# Patient Record
Sex: FEMALE | Race: WHITE | Employment: OTHER | URBAN - METROPOLITAN AREA
[De-identification: names, ages, dates, MRNs, and addresses within clinical notes are randomized per-mention and may not be internally consistent; named-entity substitution may affect disease eponyms.]

---

## 2017-08-04 ENCOUNTER — CLINICAL SUPPORT (OUTPATIENT)
Dept: INTERNAL MEDICINE CLINIC | Age: 82
End: 2017-08-04

## 2017-08-04 DIAGNOSIS — Z11.1 ENCOUNTER FOR PPD TEST: Primary | ICD-10-CM

## 2017-08-04 LAB
MM INDURATION POC: 0 MM (ref 0–5)
PPD POC: NORMAL NEGATIVE

## 2017-08-04 NOTE — PROGRESS NOTES
PPD Placement note  Garett Mcfarland, 80 y.o. female is here today for placement of PPD test  Reason for PPD test: placement  Pt taken PPD test before: no  Verified in allergy area and with patient that they are not allergic to the products PPD is made of (Phenol or Tween). Yes  Is patient taking any oral or IV steroid medication now or have they taken it in the last month? no  Has the patient ever received the BCG vaccine?: no  Has the patient been in recent contact with anyone known or suspected of having active TB disease?: no    Patient's Country of origin?: [place]  O: Alert and oriented in NAD. P:  PPD placed on 8/4/2017. Patient advised to return for reading within 48-72 hours.

## 2017-08-07 ENCOUNTER — OFFICE VISIT (OUTPATIENT)
Dept: INTERNAL MEDICINE CLINIC | Age: 82
End: 2017-08-07

## 2017-08-07 VITALS
TEMPERATURE: 95.6 F | HEART RATE: 68 BPM | BODY MASS INDEX: 18.75 KG/M2 | WEIGHT: 93 LBS | RESPIRATION RATE: 18 BRPM | OXYGEN SATURATION: 99 % | SYSTOLIC BLOOD PRESSURE: 126 MMHG | HEIGHT: 59 IN | DIASTOLIC BLOOD PRESSURE: 55 MMHG

## 2017-08-07 DIAGNOSIS — H35.30 MACULAR DEGENERATION: ICD-10-CM

## 2017-08-07 DIAGNOSIS — I10 ESSENTIAL HYPERTENSION: ICD-10-CM

## 2017-08-07 DIAGNOSIS — F03.90 DEMENTIA WITHOUT BEHAVIORAL DISTURBANCE, UNSPECIFIED DEMENTIA TYPE: Primary | ICD-10-CM

## 2017-08-07 DIAGNOSIS — I25.10 CORONARY ARTERY DISEASE INVOLVING NATIVE HEART WITHOUT ANGINA PECTORIS, UNSPECIFIED VESSEL OR LESION TYPE: ICD-10-CM

## 2017-08-07 DIAGNOSIS — L98.9 SKIN LESION: ICD-10-CM

## 2017-08-07 DIAGNOSIS — I35.0 AORTIC VALVE STENOSIS, UNSPECIFIED ETIOLOGY: ICD-10-CM

## 2017-08-07 RX ORDER — METOPROLOL SUCCINATE 50 MG/1
25 TABLET, EXTENDED RELEASE ORAL DAILY
COMMUNITY
End: 2019-01-01

## 2017-08-07 RX ORDER — MULTIVITAMIN WITH IRON
1 TABLET ORAL DAILY
COMMUNITY
End: 2017-11-07

## 2017-08-07 RX ORDER — CARBOXYMETHYLCELLULOSE SODIUM 10 MG/ML
GEL OPHTHALMIC
COMMUNITY
End: 2018-01-01 | Stop reason: SDUPTHER

## 2017-08-07 RX ORDER — MELATONIN
1 DAILY
COMMUNITY

## 2017-08-07 RX ORDER — NITROGLYCERIN 0.4 MG/1
TABLET SUBLINGUAL
COMMUNITY

## 2017-08-07 RX ORDER — RAMIPRIL 5 MG/1
CAPSULE ORAL DAILY
COMMUNITY
End: 2019-01-01

## 2017-08-07 RX ORDER — DIPHENHYDRAMINE HCL 25 MG
1 TABLET,DISINTEGRATING ORAL DAILY
COMMUNITY

## 2017-08-07 RX ORDER — ASPIRIN 81 MG/1
1 TABLET ORAL DAILY
COMMUNITY
End: 2019-01-01

## 2017-08-07 RX ORDER — AMOXICILLIN 500 MG
CAPSULE ORAL
COMMUNITY
End: 2017-11-07

## 2017-08-07 RX ORDER — TRAVOPROST OPHTHALMIC SOLUTION 0.04 MG/ML
1 SOLUTION OPHTHALMIC EVERY EVENING
COMMUNITY

## 2017-08-07 NOTE — MR AVS SNAPSHOT
Visit Information Date & Time Provider Department Dept. Phone Encounter #  
 8/7/2017 12:45 PM Jimi Hamlin [phone] Follow-up Instructions Return in about 3 months (around 11/7/2017), or if symptoms worsen or fail to improve. Upcoming Health Maintenance Date Due DTaP/Tdap/Td series (1 - Tdap) 1/10/1949 ZOSTER VACCINE AGE 60> 11/10/1987 GLAUCOMA SCREENING Q2Y 1/10/1993 OSTEOPOROSIS SCREENING (DEXA) 1/10/1993 Pneumococcal 65+ Low/Medium Risk (1 of 2 - PCV13) 1/10/1993 MEDICARE YEARLY EXAM 1/10/1993 INFLUENZA AGE 9 TO ADULT 8/1/2017 Allergies as of 8/7/2017  Review Complete On: 8/7/2017 By: Jimi Hamlin MD  
 No Known Allergies Current Immunizations  Never Reviewed Name Date  
 TB Skin Test (PPD) Intradermal 8/4/2017 Not reviewed this visit You Were Diagnosed With   
  
 Codes Comments Dementia without behavioral disturbance, unspecified dementia type    -  Primary ICD-10-CM: F03.90 ICD-9-CM: 294.20 Essential hypertension     ICD-10-CM: I10 
ICD-9-CM: 401.9 Coronary artery disease involving native heart without angina pectoris, unspecified vessel or lesion type     ICD-10-CM: I25.10 ICD-9-CM: 414.01 Aortic valve stenosis, unspecified etiology     ICD-10-CM: I35.0 ICD-9-CM: 424.1 Macular degeneration     ICD-10-CM: H35.30 ICD-9-CM: 362.50 Vitals BP Pulse Temp Resp Height(growth percentile) Weight(growth percentile) 126/55 68 95.6 °F (35.3 °C) (Oral) 18 4' 10.5\" (1.486 m) 93 lb (42.2 kg) SpO2 BMI OB Status Smoking Status 99% 19.11 kg/m2 Postmenopausal Former Smoker BMI and BSA Data Body Mass Index Body Surface Area  
 19.11 kg/m 2 1.32 m 2 Your Updated Medication List  
  
   
This list is accurate as of: 8/7/17  2:00 PM.  Always use your most recent med list.  
  
  
  
  
 aspirin delayed-release 81 mg tablet Take  by mouth daily. calcium carbonate-vitamin D3 600 mg(1,500mg) -800 unit Tab Take  by mouth. DAILY MULTI-VITAMINS/IRON tablet Generic drug:  multivitamin with iron Take 1 Tab by mouth daily. FISH -1,000 mg Cap Generic drug:  fish oil-omega-3 fatty acids Take  by mouth.  
  
 metoprolol succinate 50 mg XL tablet Commonly known as:  TOPROL-XL Take  by mouth daily. NITROSTAT 0.4 mg SL tablet Generic drug:  nitroglycerin  
by SubLINGual route every five (5) minutes as needed for Chest Pain. ramipril 5 mg capsule Commonly known as:  ALTACE Take  by mouth daily. REFRESH LIQUIGEL 1 % Dlgl Generic drug:  carboxymethylcellulose sodium Apply  to eye. travoprost 0.004 % ophthalmic solution Commonly known as:  TRAVATAN Z Administer 1 Drop to both eyes every evening. VITAMIN D3 1,000 unit tablet Generic drug:  cholecalciferol Take  by mouth daily. Follow-up Instructions Return in about 3 months (around 11/7/2017), or if symptoms worsen or fail to improve. Patient Instructions Helping A Person With Dementia: Care Instructions Your Care Instructions Dementia is a loss of mental skills that affects daily life. It is different from mild memory loss that occurs with aging. Dementia can cause problems with memory, thinking clearly, and planning. It is different for everyone. But it usually gets worse slowly. Some people who have dementia can function well for a long time. But at some point it may become hard for the person to care for himself or herself. It can be upsetting to learn that a loved one has this condition. You may be afraid and worried about what will happen. You may wonder how you will care for the person. There is no cure for dementia. But medicine may be able to slow memory loss and improve thinking for a while. Other medicines may help with sleep, depression, and behavior changes. Dementia is different for everyone. In some cases, people can function well for a long time. You can help your loved one by making his or her home life easier and safer. You also need to take care of yourself. Caregiving can be stressful. But support is available to help you and give you a break when you need it. The Alzheimer's Association offers good information and support. If you are caring for someone with dementia, you can help make life safer and more comfortable. You can also help your loved one make decisions about future care. You may also want to bring up legal and financial issues. These are hard but important conversations to have. Follow-up care is a key part of your loved one's treatment and safety. Be sure to make and go to all appointments, and call your doctor if your loved one is having problems. It's also a good idea to know your loved one's test results and keep a list of the medicines he or she takes. How can you care for your loved one at home? Taking care of the person · If the person takes medicine for dementia, help him or her take it exactly as prescribed. Call the doctor if you notice any problems with the medicine. · Make a list of the person's medicines. Review it with all of his or her doctors. · Help the person eat a balanced diet. Serve plenty of whole grains, fruits, and vegetables every day. If the person is not hungry at mealtimes, give snacks at midmorning and in the afternoon. Offer drinks such as Boost, Ensure, or Sustacal if the person is losing weight. · Encourage exercise. Walking and other activities may slow the decline of mental ability. Help the person stay active mentally with reading, crossword puzzles, or other hobbies. · Take steps to help if the person is sundowning. This is the restless behavior and trouble with sleeping that may occur in late afternoon and at night. Try not to let the person nap during the day.  Offer a glass of warm milk or caffeine-free tea before bedtime. · Develop a routine. Your loved one will feel less frustrated or confused with a clear, simple plan of what to do every day. · Be patient. A task may take the person longer than it used to. · For as long as he or she is able, allow your loved one to make decisions about activities, food, clothing, and other choices. Let him or her be independent, even if tasks take more time or are not done perfectly. Tailor tasks to the person's abilities. For example, if cooking is no longer safe, ask for other help. Your loved one can help set the table, or make simple dishes such as a salad. When the person needs help, offer it gently. Staying safe · Make your home (or your loved one's home) safe. Tack down rugs, and put no-slip tape in the tub. Install handrails, and put safety switches on stoves and appliances. Keep rooms free of clutter. Make sure walkways around furniture are clear. Do not move furniture around, because the person may become confused. · Use locks on doors and cupboards. Lock up knives, scissors, medicines, cleaning supplies, and other dangerous things. · Do not let the person drive or cook if he or she can't do it safely. A person with dementia should not drive unless he or she is able to pass an on-road driving test. Your state 's license bureau can do a driving test if there is any question. · Get medical alert [facility] for the person so that you can be contacted if he or she wanders away. If possible, provide a safe place for wandering, such as an enclosed yard or garden. Taking care of yourself · Ask your doctor about support groups and other resources in your area. · Take care of your health. Be sure to eat healthy foods and get enough rest and exercise. · Take time for yourself. Respite services provide someone to stay with the person for a short time while you get out of the house for a few hours. · Make time for an activity that you enjoy. Read, listen to music, paint, do crafts, or play an instrument, even if it's only for a few minutes a day. · Spend time with family, friends, and others in your support system. When should you call for help? Call 911 anytime you think the person may need emergency care. For example, call if: · The person who has dementia wanders away and you can't find him or her. · The person who has dementia is seriously injured. Call the doctor now or seek immediate medical care if: · The person suddenly sees things that are not there (hallucinates). · The person has a sudden change in his or her behavior. Watch closely for changes in the person's health, and be sure to contact the doctor if: · The person has symptoms that could cause injury. · The person has problems with his or her medicine. · You need more information to care for a person with dementia. · You need respite care so you can take a break. Where can you learn more? Go to http://anneliese-natalio.info/. Enter L974 in the search box to learn more about \"Helping A Person With Dementia: Care Instructions. \" Current as of: July 26, 2016 Content Version: 11.3 © 6432-6500 [employer]. Care instructions adapted under license by [employer] (which disclaims liability or warranty for this information). If you have questions about a medical condition or this instruction, always ask your healthcare professional. [place] [address] any warranty or liability for your use of this information. Introducing [facility]! [employer] introduces [employer] patient portal. Now you can access parts of your medical record, email your doctor's office, and request medication refills online. 1. In your internet browser, go to [email or web address] 2. Click on the First Time User? Click Here link in the Sign In box.  You will see the New Member Sign Up page. 3. Enter your MeeDoc Access Code exactly as it appears below. You will not need to use this code after youve completed the sign-up process. If you do not sign up before the expiration date, you must request a new code. · MeeDoc Access Code: [email or web address] Expires: 11/5/2017  2:00 PM 
 
4. Enter the last four digits of your Social Security Number (xxxx) and Date of Birth (mm/dd/yyyy) as indicated and click Submit. You will be taken to the next sign-up page. 5. Create a MeeDoc ID. This will be your MeeDoc login ID and cannot be changed, so think of one that is secure and easy to remember. 6. Create a MeeDoc password. You can change your password at any time. 7. Enter your Password Reset Question and Answer. This can be used at a later time if you forget your password. 8. Enter your e-mail address. You will receive e-mail notification when new information is available in [address]. 9. Click Sign Up. You can now view and download portions of your medical record. 10. Click the Download Summary menu link to download a portable copy of your medical information. If you have questions, please visit the Frequently Asked Questions section of the MeeDoc website. Remember, MeeDoc is NOT to be used for urgent needs. For medical emergencies, dial 911. Now available from your iPhone and Android! Please provide this summary of care documentation to your next provider. If you have any questions after today's visit, please call [phone].

## 2017-08-07 NOTE — LETTER
8/7/2017 1:51 PM 
 
Ms. Harjit Gaspar [address] [id number] To Whom It May Concern: The above patient was evaluated at [facility]. Due to her cognitive impairments, she is not capable of living independently at this time and assisted living and supervision is recommended. Please contact our office if further information is needed. Sincerely, Michael Armando MD

## 2017-08-07 NOTE — PATIENT INSTRUCTIONS
Helping A Person With Dementia: Care Instructions  Your Care Instructions  Dementia is a loss of mental skills that affects daily life. It is different from mild memory loss that occurs with aging. Dementia can cause problems with memory, thinking clearly, and planning. It is different for everyone. But it usually gets worse slowly. Some people who have dementia can function well for a long time. But at some point it may become hard for the person to care for himself or herself. It can be upsetting to learn that a loved one has this condition. You may be afraid and worried about what will happen. You may wonder how you will care for the person. There is no cure for dementia. But medicine may be able to slow memory loss and improve thinking for a while. Other medicines may help with sleep, depression, and behavior changes. Dementia is different for everyone. In some cases, people can function well for a long time. You can help your loved one by making his or her home life easier and safer. You also need to take care of yourself. Caregiving can be stressful. But support is available to help you and give you a break when you need it. The Alzheimer's Association offers good information and support. If you are caring for someone with dementia, you can help make life safer and more comfortable. You can also help your loved one make decisions about future care. You may also want to bring up legal and financial issues. These are hard but important conversations to have. Follow-up care is a key part of your loved one's treatment and safety. Be sure to make and go to all appointments, and call your doctor if your loved one is having problems. It's also a good idea to know your loved one's test results and keep a list of the medicines he or she takes. How can you care for your loved one at home? Taking care of the person  · If the person takes medicine for dementia, help him or her take it exactly as prescribed. Call the doctor if you notice any problems with the medicine. · Make a list of the person's medicines. Review it with all of his or her doctors. · Help the person eat a balanced diet. Serve plenty of whole grains, fruits, and vegetables every day. If the person is not hungry at mealtimes, give snacks at midmorning and in the afternoon. Offer drinks such as Boost, Ensure, or Sustacal if the person is losing weight. · Encourage exercise. Walking and other activities may slow the decline of mental ability. Help the person stay active mentally with reading, crossword puzzles, or other hobbies. · Take steps to help if the person is sundowning. This is the restless behavior and trouble with sleeping that may occur in late afternoon and at night. Try not to let the person nap during the day. Offer a glass of warm milk or caffeine-free tea before bedtime. · Develop a routine. Your loved one will feel less frustrated or confused with a clear, simple plan of what to do every day. · Be patient. A task may take the person longer than it used to. · For as long as he or she is able, allow your loved one to make decisions about activities, food, clothing, and other choices. Let him or her be independent, even if tasks take more time or are not done perfectly. Tailor tasks to the person's abilities. For example, if cooking is no longer safe, ask for other help. Your loved one can help set the table, or make simple dishes such as a salad. When the person needs help, offer it gently. Staying safe  · Make your home (or your loved one's home) safe. Tack down rugs, and put no-slip tape in the tub. Install handrails, and put safety switches on stoves and appliances. Keep rooms free of clutter. Make sure walkways around furniture are clear. Do not move furniture around, because the person may become confused. · Use locks on doors and cupboards.  Lock up knives, scissors, medicines, cleaning supplies, and other dangerous things. · Do not let the person drive or cook if he or she can't do it safely. A person with dementia should not drive unless he or she is able to pass an on-road driving test. Your state 's license bureau can do a driving test if there is any question. · Get medical alert jewelry for the person so that you can be contacted if he or she wanders away. If possible, provide a safe place for wandering, such as an enclosed yard or garden. Taking care of yourself  · Ask your doctor about support groups and other resources in your area. · Take care of your health. Be sure to eat healthy foods and get enough rest and exercise. · Take time for yourself. Respite services provide someone to stay with the person for a short time while you get out of the house for a few hours. · Make time for an activity that you enjoy. Read, listen to music, paint, do crafts, or play an instrument, even if it's only for a few minutes a day. · Spend time with family, friends, and others in your support system. When should you call for help? Call 911 anytime you think the person may need emergency care. For example, call if:  · The person who has dementia wanders away and you can't find him or her. · The person who has dementia is seriously injured. Call the doctor now or seek immediate medical care if:  · The person suddenly sees things that are not there (hallucinates). · The person has a sudden change in his or her behavior. Watch closely for changes in the person's health, and be sure to contact the doctor if:  · The person has symptoms that could cause injury. · The person has problems with his or her medicine. · You need more information to care for a person with dementia. · You need respite care so you can take a break. Where can you learn more? Go to http://anneliese-natalio.info/. Enter M131 in the search box to learn more about \"Helping A Person With Dementia: Care Instructions. \"  Current as of: July 26, 2016  Content Version: 11.3  © 8499-4301 [employer]. Care instructions adapted under license by [employer] (which disclaims liability or warranty for this information). If you have questions about a medical condition or this instruction, always ask your healthcare professional. [place] [address] any warranty or liability for your use of this information.

## 2017-08-07 NOTE — PROGRESS NOTES
HISTORY OF PRESENT ILLNESS  Candie Che is a 80 y.o. female. HPI Comments: 81 yo female here to establish care, completion of paperwork for ESA, f/u of HTN, CAD. She had been living in [place] for past 18 months, but recently moved back to [place]. Has been living with her son Bard Merino and daughter-in-law for the past two weeks with plans to move to [place]. Family notes gradual memory issues over the past few years. She is independent with ADLs with supervision with bathing. Family helps with IADLs, medication management. Old records limited at this time but being requested. She had been seen by Dr. Gus Garcia - cardiology when she lived in [place] in the past. H/o CAD s/p 2 vessel CABG. Has AS which is mod-severe, but not having sx. She has h/o hip fracture in the past. Ambulates without assistive device. Macular degeneration, glaucoma followed by ophthalmology. Review of Systems   Constitutional: Negative for chills, fever and weight loss. HENT: Negative for congestion. Eyes: Negative for blurred vision and pain. Respiratory: Negative for cough and shortness of breath. Cardiovascular: Negative for chest pain, palpitations and leg swelling. Gastrointestinal: Negative for heartburn, nausea and vomiting. Musculoskeletal: Negative for falls, joint pain and myalgias. Skin: Positive for rash. Negative for itching. Skin lesion nose, R cheek; h/o skin cancer   Neurological: Negative for dizziness, tingling and headaches. Psychiatric/Behavioral: Negative for depression. The patient is not nervous/anxious. Past Medical History:   Diagnosis Date    Femoral hernia     Hip fracture, left ([facility] [address] 2013    Hypertension      No current outpatient prescriptions on file prior to visit. No current facility-administered medications on file prior to visit.       Social History   Substance Use Topics    Smoking status: Former Smoker    Smokeless tobacco: Never Used      Comment: approx 30 yrs ago    Alcohol use No      Comment: occasionally   No Known Allergies  Physical Exam   Constitutional: She appears well-developed and well-nourished. No distress. /55  Pulse 68  Temp 95.6 °F (35.3 °C) (Oral)   Resp 18  Ht 4' 10.5\" (1.486 m)  Wt 93 lb (42.2 kg)  SpO2 99%  BMI 19.11 kg/m2     Eyes: EOM are normal. Right eye exhibits no discharge. Left eye exhibits no discharge. No scleral icterus. Neck: Neck supple. Cardiovascular: Normal rate, regular rhythm and normal heart sounds. Exam reveals no gallop and no friction rub. No murmur heard. Pulmonary/Chest: Effort normal and breath sounds normal. No respiratory distress. She has no wheezes. She has no rales. Musculoskeletal: She exhibits no edema or tenderness. Lymphadenopathy:     She has no cervical adenopathy. Neurological: She is alert. She exhibits normal muscle tone. Skin: Skin is warm and dry. Lesion (? AK nose, R cheek) noted. Psychiatric: She has a normal mood and affect. Cognition and memory are impaired. MMSE 16/30       ASSESSMENT and PLAN    ICD-10-CM ICD-9-CM    1. Dementia without behavioral disturbance, unspecified dementia type F03.90 294.20    2. Essential hypertension I10 401.9    3. Coronary artery disease involving native heart without angina pectoris, unspecified vessel or lesion type I25.10 414.01    4. Aortic valve stenosis, unspecified etiology I35.0 424.1    5. Macular degeneration H35.30 362.50    Paperwork completed for ESA. Will continue current medication for now. Can consider adding medication such as donepezil or Namenda for dementia. Will obtain results of prior testing. CAD and AS are asymptomatic at this time. WIll monitor.    Derm referral for skin lesions given CA hx.

## 2017-08-07 NOTE — PROGRESS NOTES
Chief Complaint   Patient presents with    New Patient     Here for Facility Entrance papterwork to [address]       Pt preferred language for health care discussion is English. Is someone accompanying this pt? daughter    Is the patient using any DME equipment during OV? no    Depression Screening:  PHQ over the last two weeks 8/7/2017   Little interest or pleasure in doing things Not at all   Feeling down, depressed or hopeless Not at all   Total Score PHQ 2 0       Learning Assessment:  Learning Assessment 8/7/2017   PRIMARY LEARNER Patient   HIGHEST LEVEL OF EDUCATION - PRIMARY LEARNER  SOME COLLEGE   PRIMARY LANGUAGE ENGLISH   LEARNER PREFERENCE PRIMARY READING   ANSWERED BY Luis Alfredo Macias   RELATIONSHIP SELF       Abuse Screening:  Abuse Screening Questionnaire 8/7/2017   Do you ever feel afraid of your partner? N   Are you in a relationship with someone who physically or mentally threatens you? N   Is it safe for you to go home? Y       Fall Risk  Fall Risk Assessment, last 12 mths 8/7/2017   Able to walk? Yes   Fall in past 12 months? No         Health Maintenance reviewed and discussed per provider. Yes, first visit, all HM discussed and ordered by the Provider      Advance Directive:  1. Do you have an advance directive in place? Patient Reply:Unsure    2. If not, would you like material regarding how to put one in place? Patient Reply: Yes given    Coordination of Care:  1. Have you been to the ER, urgent care clinic since your last visit? Hospitalized since your last visit? no    2. Have you seen or consulted any other health care providers outside of the [address] [place] [address] since your last visit? Include any pap smears or colon screening.  no

## 2017-10-13 RX ORDER — PHENOL/SODIUM PHENOLATE
20 AEROSOL, SPRAY (ML) MUCOUS MEMBRANE DAILY
Qty: 90 TAB | Refills: 3 | Status: SHIPPED | OUTPATIENT
Start: 2017-10-13 | End: 2017-11-07 | Stop reason: SDUPTHER

## 2017-11-07 ENCOUNTER — OFFICE VISIT (OUTPATIENT)
Dept: INTERNAL MEDICINE CLINIC | Age: 82
End: 2017-11-07

## 2017-11-07 VITALS
RESPIRATION RATE: 14 BRPM | TEMPERATURE: 95.7 F | SYSTOLIC BLOOD PRESSURE: 146 MMHG | BODY MASS INDEX: 18.99 KG/M2 | OXYGEN SATURATION: 99 % | HEART RATE: 67 BPM | WEIGHT: 94.2 LBS | DIASTOLIC BLOOD PRESSURE: 49 MMHG | HEIGHT: 59 IN

## 2017-11-07 DIAGNOSIS — F02.80 LATE ONSET ALZHEIMER'S DISEASE WITHOUT BEHAVIORAL DISTURBANCE (HCC): ICD-10-CM

## 2017-11-07 DIAGNOSIS — K21.9 GASTROESOPHAGEAL REFLUX DISEASE WITHOUT ESOPHAGITIS: ICD-10-CM

## 2017-11-07 DIAGNOSIS — I35.0 AORTIC STENOSIS, SEVERE: ICD-10-CM

## 2017-11-07 DIAGNOSIS — G30.1 LATE ONSET ALZHEIMER'S DISEASE WITHOUT BEHAVIORAL DISTURBANCE (HCC): ICD-10-CM

## 2017-11-07 DIAGNOSIS — Z00.00 MEDICARE ANNUAL WELLNESS VISIT, SUBSEQUENT: Primary | ICD-10-CM

## 2017-11-07 DIAGNOSIS — I10 ESSENTIAL HYPERTENSION: ICD-10-CM

## 2017-11-07 DIAGNOSIS — H61.23 CERUMEN DEBRIS ON TYMPANIC MEMBRANE OF BOTH EARS: ICD-10-CM

## 2017-11-07 RX ORDER — DONEPEZIL HYDROCHLORIDE 5 MG/1
5 TABLET, FILM COATED ORAL
Qty: 30 TAB | Refills: 2 | Status: SHIPPED | OUTPATIENT
Start: 2017-11-07

## 2017-11-07 RX ORDER — PHENOL/SODIUM PHENOLATE
20 AEROSOL, SPRAY (ML) MUCOUS MEMBRANE DAILY
Qty: 90 TAB | Refills: 3 | Status: SHIPPED | OUTPATIENT
Start: 2017-11-07 | End: 2017-12-08 | Stop reason: SDUPTHER

## 2017-11-07 NOTE — MR AVS SNAPSHOT
Visit Information Date & Time Provider Department Dept. Phone Encounter #  
 11/7/2017 10:30 AM [facility] [phone] [id number] Follow-up Instructions Return in about 4 months (around 3/7/2018), or if symptoms worsen or fail to improve. Upcoming Health Maintenance Date Due DTaP/Tdap/Td series (1 - Tdap) 1[birth year]10/1949 ZOSTER VACCINE AGE 60> 11/10/1987 GLAUCOMA SCREENING Q2Y 1/10/1993 OSTEOPOROSIS SCREENING (DEXA) 1/10/1993 MEDICARE YEARLY EXAM 1/10/1993 Allergies as of 11/7/2017  Review Complete On: 11/7/2017 By: Bernarda Crowe MD  
 No Known Allergies Current Immunizations  Reviewed on 11/1/2017 Name Date Influenza High Dose Vaccine PF 11/1/2017 Influenza Vaccine 10/18/2012 12:00 AM  
 Pneumococcal Conjugate (PCV-13) 5/28/2015 Pneumococcal Polysaccharide (PPSV-23) 10/18/2012 12:00 AM, 10/29/2007 TB Skin Test (PPD) Intradermal 8/4/2017 Not reviewed this visit You Were Diagnosed With   
  
 Codes Comments Medicare annual wellness visit, subsequent    -  Primary ICD-10-CM: Z00.00 ICD-9-CM: V70.0 Late onset Alzheimer's disease without behavioral disturbance     ICD-10-CM: G30.1, F02.80 ICD-9-CM: 331.0, 294.10 Essential hypertension     ICD-10-CM: I10 
ICD-9-CM: 401.9 Aortic stenosis, severe     ICD-10-CM: I35.0 ICD-9-CM: 424.1 Gastroesophageal reflux disease without esophagitis     ICD-10-CM: K21.9 ICD-9-CM: 530.81 Cerumen debris on tympanic membrane of both ears     ICD-10-CM: H61.23 
ICD-9-CM: 380.4 Vitals BP Pulse Temp Resp Height(growth percentile) Weight(growth percentile) 146/49 (BP 1 Location: Left arm, BP Patient Position: Sitting) 67 95.7 °F (35.4 °C) (Oral) 14 4' 10.5\" (1.486 m) 94 lb 3.2 oz (42.7 kg) SpO2 BMI OB Status Smoking Status 99% 19.35 kg/m2 Postmenopausal Former Smoker Vitals History BMI and BSA Data Body Mass Index Body Surface Area  
 19.35 kg/m 2 1.33 m 2 Your Updated Medication List  
  
   
This list is accurate as of: 11/7/17 11:18 AM.  Always use your most recent med list.  
  
  
  
  
 aspirin delayed-release 81 mg tablet Take  by mouth daily. calcium carbonate-vitamin D3 600 mg(1,500mg) -800 unit Tab Take  by mouth. donepezil 5 mg tablet Commonly known as:  ARICEPT Take 1 Tab by mouth nightly. For one month. If tolerated, increase to 10 mg daily after one month  
  
 metoprolol succinate 50 mg XL tablet Commonly known as:  TOPROL-XL Take  by mouth daily. NITROSTAT 0.4 mg SL tablet Generic drug:  nitroglycerin  
by SubLINGual route every five (5) minutes as needed for Chest Pain. Omeprazole delayed release 20 mg tablet Commonly known as:  PRILOSEC D/R Take 1 Tab by mouth daily. May take for 14 days then discontinue. May repeat for symptom recurrence  Indications: Heartburn  
  
 ramipril 5 mg capsule Commonly known as:  ALTACE Take  by mouth daily. REFRESH LIQUIGEL 1 % Dlgl Generic drug:  carboxymethylcellulose sodium Apply  to eye. travoprost 0.004 % ophthalmic solution Commonly known as:  TRAVATAN Z Administer 1 Drop to both eyes every evening. VITAMIN D3 1,000 unit tablet Generic drug:  cholecalciferol Take  by mouth daily. Prescriptions Printed Refills Omeprazole delayed release (PRILOSEC D/R) 20 mg tablet 3 Sig: Take 1 Tab by mouth daily. May take for 14 days then discontinue. May repeat for symptom recurrence  Indications: Heartburn Class: Print Route: Oral  
 donepezil (ARICEPT) 5 mg tablet 2 Sig: Take 1 Tab by mouth nightly. For one month. If tolerated, increase to 10 mg daily after one month Class: Print Route: Oral  
  
We Performed the Following REFERRAL TO AUDIOLOGY [REF7 Custom] Comments:  
 Please evaluate patient for hearing loss. Follow-up Instructions Return in about 4 months (around 3/7/2018), or if symptoms worsen or fail to improve. Referral Information Referral ID Referred By Referred To  
  
 [id number] Randi WILEY Not Available Visits Status Start Date End Date 1 New Request 11/7/17 11/7/18 If your referral has a status of pending review or denied, additional information will be sent to support the outcome of this decision. Patient Instructions Donepezil (Aricept) can be increased to 10 mg daily in one month if tolerated. Medicare Wellness Visit, Female The best way to live healthy is to have a healthy lifestyle by eating a well-balanced diet, exercising regularly, limiting alcohol and stopping smoking. Regular physical exams and screening tests are another way to keep healthy. Preventive exams provided by your health care provider can find health problems before they become diseases or illnesses. Preventive services including immunizations, screening tests, monitoring and exams can help you take care of your own health. All people over age 72 should have a pneumovax  and and a prevnar shot to prevent pneumonia. These are once in a lifetime unless you and your provider decide differently. All people over 65 should have a yearly flu shot and a tetanus vaccine every 10 years. A bone mass density to screen for osteoporosis or thinning of the bones should be done every 2 years after 65. Screening for diabetes mellitus with a blood sugar test should be done every year. Glaucoma is a disease of the eye due to increased ocular pressure that can lead to blindness and it should be done every year by an eye professional. 
 
Cardiovascular screening tests that check for elevated lipids (fatty part of blood) which can lead to heart disease and strokes should be done every 5 years.  
 
Colorectal screening that evaluates for blood or polyps in your colon should be done yearly as a stool test or every five years as a flexible sigmoidoscope or every 10 years as a colonoscopy up to age 76. Breast cancer screening with a mammogram is recommended biennially  for women age 54-69. Screening for cervical cancer with a pap smear and pelvic exam is recommended for women after age 72 years every 2 years up to age 79 or when the provider and patient decide to stop. If there is a history of cervical abnormalities or other increased risk for cancer then the test is recommended yearly. Hepatitis C screening is also recommended for anyone born between 80 through [address]. A shingles vaccine is also recommended once in a lifetime after age 61. Your Medicare Wellness Exam is recommended annually. Here is a list of your current Health Maintenance items with a due date: 
Health Maintenance Due Topic Date Due  
 DTaP/Tdap/Td  (1 - Tdap) 01/10/1949  Shingles Vaccine  11/10/1987  Glaucoma Screening   01/10/1993  Bone Density Screening  01/10/1993 Ta Ruelas Annual Well Visit  01/10/1993 Donepezil (By mouth) Donepezil ([id number]) Treats Alzheimer disease. Brand Name(s): Aricept There may be other brand names for this medicine. When This Medicine Should Not Be Used: This medicine is not right for everyone. Do not use it if you had an allergic reaction to donepezil or to medicines that contain piperidine. How to Use This Medicine:  
Tablet, Dissolving Tablet · Your doctor will tell you how much medicine to use. Do not use more than directed. · Read and follow the patient instructions that come with this medicine. Talk to your doctor or pharmacist if you have any questions. · Tablet: Swallow the 23-mg tablet whole. Do not crush, break, or chew it. · Disintegrating tablet:Make sure your hands are dry before you handle the disintegrating tablet.  Peel back the foil from the blister pack, then remove the tablet. Do not push the tablet through the foil. Place the tablet in your mouth. After it has melted, swallow or take a drink of water. · Missed dose: Skip the missed dose and take your next dose at the regular time. Do not take extra medicine to make up for a missed dose. · Store the medicine in a closed container at room temperature, away from heat, moisture, and direct light. Drugs and Foods to Avoid: Ask your doctor or pharmacist before using any other medicine, including over-the-counter medicines, vitamins, and herbal products. · Some medicines can affect how donepezil works. Tell your doctor if you are using any of the following: ¨ Bethanechol, carbamazepine, dexamethasone, ketoconazole, phenobarbital, phenytoin, quinidine, or rifampin ¨ NSAID pain or arthritis medicine (such as aspirin, diclofenac, ibuprofen, naproxen) Warnings While Using This Medicine: · Tell your doctor if you are pregnant or breastfeeding, or if you have heart disease, lung disease, asthma or other breathing problems, stomach ulcers or bleeding, or trouble urinating. · This medicine may cause the following problems: ¨ Slow heartbeat ¨ Stomach or bowel bleeding ¨ Seizures · Tell any doctor or dentist who treats you that you are using this medicine. You may need to stop using this medicine several days before you have surgery or medical tests. · Your doctor will check your progress and the effects of this medicine at regular visits. Keep all appointments. · Keep all medicine out of the reach of children. Never share your medicine with anyone. Possible Side Effects While Using This Medicine:  
Call your doctor right away if you notice any of these side effects: · Allergic reaction: Itching or hives, swelling in your face or hands, swelling or tingling in your mouth or throat, chest tightness, trouble breathing · Bloody or black, tarry stools · Change in how much or how often you urinate · Chest pain, slow or uneven heartbeat, trouble breathing · Lightheadedness, dizziness, fainting · Seizures · Severe stomach pain · Unusual bleeding, bruising, or weakness · Vomiting of blood or material that looks like coffee grounds If you notice these less serious side effects, talk with your doctor: · Mild nausea, vomiting, or diarrhea · Weight loss If you notice other side effects that you think are caused by this medicine, tell your doctor. Call your doctor for medical advice about side effects. You may report side effects to FDA at 9-056-IQU-0432 © 2017 [address] Information is for End User's use only and may not be sold, redistributed or otherwise used for commercial purposes. The above information is an  only. It is not intended as medical advice for individual conditions or treatments. Talk to your doctor, nurse or pharmacist before following any medical regimen to see if it is safe and effective for you. Introducing hospitals & HEALTH SERVICES! Nerissa Luna introduces [employer] patient portal. Now you can access parts of your medical record, email your doctor's office, and request medication refills online. 1. In your internet browser, go to [email or web address] 2. Click on the First Time User? Click Here link in the Sign In box. You will see the New Member Sign Up page. 3. Enter your [employer] Access Code exactly as it appears below. You will not need to use this code after youve completed the sign-up process. If you do not sign up before the expiration date, you must request a new code. · [employer] Access Code: [email or web address] Expires: 2/5/2018 11:14 AM 
 
4. Enter the last four digits of your Social Security Number (xxxx) and Date of Birth (mm/dd/yyyy) as indicated and click Submit. You will be taken to the next sign-up page. 5. Create a [employer] ID.  This will be your [employer] login ID and cannot be changed, so think of one that is secure and easy to remember. 6. Create a [employer] password. You can change your password at any time. 7. Enter your Password Reset Question and Answer. This can be used at a later time if you forget your password. 8. Enter your e-mail address. You will receive e-mail notification when new information is available in [address] 9. Click Sign Up. You can now view and download portions of your medical record. 10. Click the Download Summary menu link to download a portable copy of your medical information. If you have questions, please visit the Frequently Asked Questions section of the [employer] website. Remember, [employer] is NOT to be used for urgent needs. For medical emergencies, dial 911. Now available from your iPhone and Android! Please provide this summary of care documentation to your next provider. Your primary care clinician is listed as Junaid Chambers. If you have any questions after today's visit, please call [phone].

## 2017-11-07 NOTE — PROGRESS NOTES
ROOM # [id number]    Magaly Skelton presents today for   Chief Complaint   Patient presents with   [facility] Annual Wellness Visit         Dementia    Hypertension       Magaly Skelton preferred language for health care discussion is english/other. Is someone accompanying this pt? Yes, daughter in law    Is the patient using any DME equipment during [address]? no    Depression Screening:  PHQ over the last two weeks 8/7/2017   Little interest or pleasure in doing things Not at all   Feeling down, depressed or hopeless Not at all   Total Score PHQ 2 0       Learning Assessment:  Learning Assessment 8/7/2017   PRIMARY LEARNER Patient   HIGHEST LEVEL OF EDUCATION - PRIMARY LEARNER  SOME COLLEGE   PRIMARY LANGUAGE ENGLISH   LEARNER PREFERENCE PRIMARY READING   ANSWERED BY Nanda Natarajan   RELATIONSHIP SELF       Abuse Screening:  Abuse Screening Questionnaire 8/7/2017   Do you ever feel afraid of your partner? N   Are you in a relationship with someone who physically or mentally threatens you? N   Is it safe for you to go home? Y       Fall Risk  Fall Risk Assessment, last 12 mths 8/7/2017   Able to walk? Yes   Fall in past 12 months? No       Health Maintenance reviewed and discussed per provider. Yes    Magaly Skelton is due for tdap, zoster, glaucoma, dexa scan MWV (getting today). Please order/place referral if appropriate. Advance Directive:  1. Do you have an advance directive in place? Patient Reply: yes, - no changes to     Coordination of Care:  1. Have you been to the ER, urgent care clinic since your last visit? Hospitalized since your last visit? no    2. Have you seen or consulted any other health care providers outside of the [address] [place] [address] since your last visit? Include any pap smears or colon screening. Yes Derm, Cardiology    Please see Red banners under Allergies, Med rec, Immunizations to remove outside inquires. All correct information has been verified with patient and added to chart.

## 2017-11-07 NOTE — ACP (ADVANCE CARE PLANNING)
Advance Directive:  1. Do you have an advance directive in place?  Patient Reply: yes, - no changes to

## 2017-11-07 NOTE — PROGRESS NOTES
HISTORY OF PRESENT ILLNESS  Vaishali Case is a 80 y.o. female. HPI Comments: 81 yo female here for [address], f/u of dementia, HTN. She is accompanied by her daughter-in-law. Lives in [place] which she likes. Does get anxious/frustrated at times with her memory. Has not been on medication for this in the past. MMSE 16/30 last visit. BP is a little increased from last visit. Has seen cardiology for her AS. Fish oil stopped. May consider decreasing metoprolol in the future. No CP at this time. She occasionally will feel weak/dizzy at the end of [place] due to standing/kneeling. H/o GERD. Had not been taking PPI but notes occasionally with GI sx. Review of Systems   Constitutional: Negative for chills, fever and weight loss. HENT: Positive for hearing loss. Negative for congestion and ear pain. Eyes: Positive for blurred vision (macular degeneration). Negative for pain. Respiratory: Negative for cough and shortness of breath. Cardiovascular: Negative for chest pain, palpitations and leg swelling. Gastrointestinal: Negative for nausea and vomiting. Neurological: Negative for tingling and headaches. Psychiatric/Behavioral: Negative for depression. The patient is not nervous/anxious. Past Medical History:   Diagnosis Date    Aortic heart valve narrowing     Chronic kidney disease     Femoral hernia     GERD (gastroesophageal reflux disease)     Glaucoma     Hip fracture, left ([address] 2013    Hypercholesterolemia     Hypertension     Lump in the abdomen     Macular degeneration     Myocardial infarct, old     Pulmonary HTN     Unstable chest pain due to insufficient blood supply to heart [facility])      Current Outpatient Prescriptions on File Prior to Visit   Medication Sig Dispense Refill    Omeprazole delayed release (PRILOSEC D/R) 20 mg tablet Take 1 Tab by mouth daily. Indications: Heartburn 90 Tab 3    cholecalciferol (VITAMIN D3) 1,000 unit tablet Take  by mouth daily.       metoprolol succinate (TOPROL-XL) 50 mg XL tablet Take  by mouth daily.  aspirin delayed-release 81 mg tablet Take  by mouth daily.  calcium carbonate-vitamin D3 600 mg(1,500mg) -800 unit tab Take  by mouth.  fish oil-omega-3 fatty acids (FISH OIL) 300-1,000 mg cap Take  by mouth.  travoprost (TRAVATAN Z) 0.004 % ophthalmic solution Administer 1 Drop to both eyes every evening.  ramipril (ALTACE) 5 mg capsule Take  by mouth daily.  carboxymethylcellulose sodium (REFRESH LIQUIGEL) 1 % dlgl Apply  to eye.  nitroglycerin (NITROSTAT) 0.4 mg SL tablet by SubLINGual route every five (5) minutes as needed for Chest Pain.  multivitamin with iron (DAILY MULTI-VITAMINS/IRON) tablet Take 1 Tab by mouth daily. No current facility-administered medications on file prior to visit. Social History   Substance Use Topics    Smoking status: Former Smoker    Smokeless tobacco: Never Used      Comment: approx 30 yrs ago    Alcohol use No      Comment: occasionally     Physical Exam   Constitutional: She appears well-developed and well-nourished. No distress. /49 (BP 1 Location: Left arm, BP Patient Position: Sitting)  Pulse 67  Temp 95.7 °F (35.4 °C) (Oral)   Resp 14  Ht 4' 10.5\" (1.486 m)  Wt 94 lb 3.2 oz (42.7 kg)  SpO2 99%  BMI 19.35 kg/m2     HENT:   B cerumen debris   Eyes: EOM are normal. Right eye exhibits no discharge. Left eye exhibits no discharge. No scleral icterus. Neck: Neck supple. Cardiovascular: Normal rate and regular rhythm. Exam reveals no gallop and no friction rub. Murmur heard. Pulmonary/Chest: Effort normal and breath sounds normal. No respiratory distress. She has no wheezes. She has no rales. Musculoskeletal: She exhibits no edema or tenderness. Lymphadenopathy:     She has no cervical adenopathy. Neurological: She is alert. She exhibits normal muscle tone. Skin: Skin is warm and dry.    Psychiatric: She has a normal mood and affect. No results found for: NA, K, CL, CO2, AGAP, GLU, BUN, CREA, BUCR, GFRAA, GFRNA, CA, TBIL, TBILI, GPT, SGOT, AP, TP, ALB, GLOB, AGRAT, ALT    ASSESSMENT and PLAN    ICD-10-CM ICD-9-CM    1. Medicare annual wellness visit, subsequent Z00.00 V70.0 REFERRAL TO AUDIOLOGY   2. Late onset Alzheimer's disease without behavioral disturbance G30.1 331.0     F02.80 294.10    3. Essential hypertension I10 401.9    4. Aortic stenosis, severe I35.0 424.1    5. Gastroesophageal reflux disease without esophagitis K21.9 530.81    6. Cerumen debris on tympanic membrane of both ears H61.23 380.4      Will continue with current medication for now. May resume omeprazole if needed for GERD sx. Will begin trial of Aricept to see if this helps with her memory. Continue f/u with cardiology for AS. Cerumen irrigated today.

## 2017-11-07 NOTE — PROGRESS NOTES
This is a Subsequent Medicare Annual Wellness Exam (AWV) (Performed 12 months after IPPE or effective date of Medicare Part B enrollment)    I have reviewed the patient's medical history in detail and updated the computerized patient record. History   81 yo female here for [address] Lives in [place]. Has ACP paperwork, DNR. Past Medical History:   Diagnosis Date    Aortic heart valve narrowing     Chronic kidney disease     Femoral hernia     GERD (gastroesophageal reflux disease)     Glaucoma     Hip fracture, left ([address] 2013    Hypercholesterolemia     Hypertension     Lump in the abdomen     Macular degeneration     Myocardial infarct, old     Pulmonary HTN     Unstable chest pain due to insufficient blood supply to heart [facility])       Past Surgical History:   Procedure Laterality Date    CARDIAC SURG PROCEDURE UNLIST  1993    double bypass     Current Outpatient Prescriptions   Medication Sig Dispense Refill    Omeprazole delayed release (PRILOSEC D/R) 20 mg tablet Take 1 Tab by mouth daily. Indications: Heartburn 90 Tab 3    cholecalciferol (VITAMIN D3) 1,000 unit tablet Take  by mouth daily.  metoprolol succinate (TOPROL-XL) 50 mg XL tablet Take  by mouth daily.  aspirin delayed-release 81 mg tablet Take  by mouth daily.  calcium carbonate-vitamin D3 600 mg(1,500mg) -800 unit tab Take  by mouth.  fish oil-omega-3 fatty acids (FISH OIL) 300-1,000 mg cap Take  by mouth.  travoprost (TRAVATAN Z) 0.004 % ophthalmic solution Administer 1 Drop to both eyes every evening.  ramipril (ALTACE) 5 mg capsule Take  by mouth daily.  carboxymethylcellulose sodium (REFRESH LIQUIGEL) 1 % dlgl Apply  to eye.  nitroglycerin (NITROSTAT) 0.4 mg SL tablet by SubLINGual route every five (5) minutes as needed for Chest Pain.  multivitamin with iron (DAILY MULTI-VITAMINS/IRON) tablet Take 1 Tab by mouth daily.        No Known Allergies  Family History   Problem Relation Age of Onset    Heart Attack Mother     Heart Attack Father      Social History   Substance Use Topics    Smoking status: Former Smoker    Smokeless tobacco: Never Used      Comment: approx 30 yrs ago    Alcohol use No      Comment: occasionally     There is no problem list on file for this patient. Depression Risk Factor Screening:     PHQ over the last two weeks 8/7/2017   Little interest or pleasure in doing things Not at all   Feeling down, depressed or hopeless Not at all   Total Score PHQ 2 0     Alcohol Risk Factor Screening: You do not drink alcohol or very rarely. Functional Ability and Level of Safety:   Hearing Loss  The patient needs further evaluation. Activities of Daily Living  The home contains: grab bars  Patient needs help with:  transportation, shopping, preparing meals, laundry, housework, managing medications and managing money    Fall RiskFall Risk Assessment, last 12 mths 8/7/2017   Able to walk? Yes   Fall in past 12 months? No       Abuse Screen  Patient is not abused    Cognitive Screening   Evaluation of Cognitive Function:  Has your family/caregiver stated any concerns about your memory: yes  Abnormal, MMSE    Patient Care Team   Patient Care Team:  Rex Alexander MD as PCP - General (Internal Medicine)  Pete Pretty MD (Ophthalmology)    Assessment/Plan   Education and counseling provided:  Are appropriate based on today's review and evaluation  End-of-Life planning (with patient's consent)    Diagnoses and all orders for this visit:    1.  Medicare annual wellness visit, subsequent      Health Maintenance Due   Topic Date Due    DTaP/Tdap/Td series (1 - Tdap) 01/10/1949    ZOSTER VACCINE AGE 60>  11/10/1987    GLAUCOMA SCREENING Q2Y  01/10/1993    OSTEOPOROSIS SCREENING (DEXA)  01/10/1993    MEDICARE YEARLY EXAM  01/10/1993     Referral entered for audiogram.

## 2017-11-07 NOTE — PATIENT INSTRUCTIONS
Donepezil (Aricept) can be increased to 10 mg daily in one month if tolerated. Medicare Wellness Visit, Female    The best way to live healthy is to have a healthy lifestyle by eating a well-balanced diet, exercising regularly, limiting alcohol and stopping smoking. Regular physical exams and screening tests are another way to keep healthy. Preventive exams provided by your health care provider can find health problems before they become diseases or illnesses. Preventive services including immunizations, screening tests, monitoring and exams can help you take care of your own health. All people over age 72 should have a pneumovax  and and a prevnar shot to prevent pneumonia. These are once in a lifetime unless you and your provider decide differently. All people over 65 should have a yearly flu shot and a tetanus vaccine every 10 years. A bone mass density to screen for osteoporosis or thinning of the bones should be done every 2 years after 65. Screening for diabetes mellitus with a blood sugar test should be done every year. Glaucoma is a disease of the eye due to increased ocular pressure that can lead to blindness and it should be done every year by an eye professional.    Cardiovascular screening tests that check for elevated lipids (fatty part of blood) which can lead to heart disease and strokes should be done every 5 years. Colorectal screening that evaluates for blood or polyps in your colon should be done yearly as a stool test or every five years as a flexible sigmoidoscope or every 10 years as a colonoscopy up to age 76. Breast cancer screening with a mammogram is recommended biennially  for women age 54-69. Screening for cervical cancer with a pap smear and pelvic exam is recommended for women after age 72 years every 2 years up to age 79 or when the provider and patient decide to stop. If there is a history of cervical abnormalities or other increased risk for cancer then the test is recommended yearly. Hepatitis C screening is also recommended for anyone born between [birth year] through [address]. A shingles vaccine is also recommended once in a lifetime after age 61. Your Medicare Wellness Exam is recommended annually. Here is a list of your current Health Maintenance items with a due date:  Health Maintenance Due   Topic Date Due    DTaP/Tdap/Td  (1 - Tdap) 01/10/1949    Shingles Vaccine  11/10/1987    Glaucoma Screening   01/10/1993    Bone Density Screening  01/10/1993    Annual Well Visit  01/10/1993     Donepezil (By mouth)   Donepezil ([id number]-ELQ-[id number])  Treats Alzheimer disease. Brand Name(s): Aricept   There may be other brand names for this medicine. When This Medicine Should Not Be Used: This medicine is not right for everyone. Do not use it if you had an allergic reaction to donepezil or to medicines that contain piperidine. How to Use This Medicine:   Tablet, Dissolving Tablet  · Your doctor will tell you how much medicine to use. Do not use more than directed. · Read and follow the patient instructions that come with this medicine. Talk to your doctor or pharmacist if you have any questions. · Tablet: Swallow the 23-mg tablet whole. Do not crush, break, or chew it. · Disintegrating tablet:Make sure your hands are dry before you handle the disintegrating tablet. Peel back the foil from the blister pack, then remove the tablet. Do not push the tablet through the foil. Place the tablet in your mouth. After it has melted, swallow or take a drink of water. · Missed dose: Skip the missed dose and take your next dose at the regular time. Do not take extra medicine to make up for a missed dose. · Store the medicine in a closed container at room temperature, away from heat, moisture, and direct light.   Drugs and Foods to Avoid:   Ask your doctor or pharmacist before using any other medicine, including over-the-counter medicines, vitamins, and herbal products. · Some medicines can affect how donepezil works. Tell your doctor if you are using any of the following:   ¨ Bethanechol, carbamazepine, dexamethasone, ketoconazole, phenobarbital, phenytoin, quinidine, or rifampin  ¨ NSAID pain or arthritis medicine (such as aspirin, diclofenac, ibuprofen, naproxen)  Warnings While Using This Medicine:   · Tell your doctor if you are pregnant or breastfeeding, or if you have heart disease, lung disease, asthma or other breathing problems, stomach ulcers or bleeding, or trouble urinating. · This medicine may cause the following problems:   ¨ Slow heartbeat  ¨ Stomach or bowel bleeding  ¨ Seizures  · Tell any doctor or dentist who treats you that you are using this medicine. You may need to stop using this medicine several days before you have surgery or medical tests. · Your doctor will check your progress and the effects of this medicine at regular visits. Keep all appointments. · Keep all medicine out of the reach of children. Never share your medicine with anyone. Possible Side Effects While Using This Medicine:   Call your doctor right away if you notice any of these side effects:  · Allergic reaction: Itching or hives, swelling in your face or hands, swelling or tingling in your mouth or throat, chest tightness, trouble breathing  · Bloody or black, tarry stools  · Change in how much or how often you urinate  · Chest pain, slow or uneven heartbeat, trouble breathing  · Lightheadedness, dizziness, fainting  · Seizures  · Severe stomach pain  · Unusual bleeding, bruising, or weakness  · Vomiting of blood or material that looks like coffee grounds  If you notice these less serious side effects, talk with your doctor:   · Mild nausea, vomiting, or diarrhea  · Weight loss  If you notice other side effects that you think are caused by this medicine, tell your doctor. Call your doctor for medical advice about side effects.  You may report side effects to FDA at 1-800-FDA-1088  © 2017 [address] Information is for End User's use only and may not be sold, redistributed or otherwise used for commercial purposes. The above information is an  only. It is not intended as medical advice for individual conditions or treatments. Talk to your doctor, nurse or pharmacist before following any medical regimen to see if it is safe and effective for you.

## 2017-11-21 RX ORDER — POLYETHYLENE GLYCOL 3350 17 G/17G
17 POWDER, FOR SOLUTION ORAL
Qty: 238 G | Refills: 1 | Status: SHIPPED | OUTPATIENT
Start: 2017-11-21

## 2017-12-08 ENCOUNTER — OFFICE VISIT (OUTPATIENT)
Dept: INTERNAL MEDICINE CLINIC | Age: 82
End: 2017-12-08

## 2017-12-08 VITALS
HEIGHT: 58 IN | BODY MASS INDEX: 19.73 KG/M2 | OXYGEN SATURATION: 94 % | RESPIRATION RATE: 15 BRPM | HEART RATE: 71 BPM | DIASTOLIC BLOOD PRESSURE: 69 MMHG | WEIGHT: 94 LBS | SYSTOLIC BLOOD PRESSURE: 145 MMHG

## 2017-12-08 DIAGNOSIS — R10.13 EPIGASTRIC PAIN: ICD-10-CM

## 2017-12-08 DIAGNOSIS — R41.0 CONFUSION: Primary | ICD-10-CM

## 2017-12-08 LAB
BILIRUB UR QL STRIP: NEGATIVE
GLUCOSE UR-MCNC: NEGATIVE MG/DL
KETONES P FAST UR STRIP-MCNC: NEGATIVE MG/DL
PH UR STRIP: 7 [PH] (ref 4.6–8)
PROT UR QL STRIP: NEGATIVE
SP GR UR STRIP: 1.01 (ref 1–1.03)
UA UROBILINOGEN AMB POC: NORMAL (ref 0.2–1)
URINALYSIS CLARITY POC: CLEAR
URINALYSIS COLOR POC: YELLOW
URINE BLOOD POC: NEGATIVE
URINE LEUKOCYTES POC: NORMAL
URINE NITRITES POC: NEGATIVE

## 2017-12-08 RX ORDER — PHENOL/SODIUM PHENOLATE
20 AEROSOL, SPRAY (ML) MUCOUS MEMBRANE DAILY
Qty: 90 TAB | Refills: 3 | Status: SHIPPED | OUTPATIENT
Start: 2017-12-08

## 2017-12-08 RX ORDER — CIPROFLOXACIN 500 MG/1
500 TABLET ORAL 2 TIMES DAILY
Qty: 6 TAB | Refills: 0 | Status: SHIPPED | OUTPATIENT
Start: 2017-12-08 | End: 2017-12-11

## 2017-12-08 NOTE — MR AVS SNAPSHOT
Visit Information Date & Time Provider Department Dept. Phone Encounter #  
 12/8/2017  2:45 PM Vivien Scott, [address] [phone] [id number] Follow-up Instructions Return if symptoms worsen or fail to improve. Your Appointments 3/7/2018 10:00 AM  
Office Visit with MD LEONEL [facility]ib[facility]) Appt Note: 4 mo f/u  
 [address]  
  
   
 [address] [facility] Upcoming Health Maintenance Date Due DTaP/Tdap/Td series (1 - Tdap) 1[birth year]10/1949 ZOSTER VACCINE AGE 60> 11/10/1987 GLAUCOMA SCREENING Q2Y 1/10/1993 OSTEOPOROSIS SCREENING (DEXA) 1/10/1993 MEDICARE YEARLY EXAM 11/8/2018 Allergies as of 12/8/2017  Review Complete On: 12/8/2017 By: Vivien Scott MD  
 No Known Allergies Current Immunizations  Reviewed on 11/1/2017 Name Date Influenza High Dose Vaccine PF 11/1/2017 Influenza Vaccine 10/18/2012 12:00 AM  
 Pneumococcal Conjugate (PCV-13) 5/28/2015 Pneumococcal Polysaccharide (PPSV-23) 10/18/2012 12:00 AM, 10/29/2007 TB Skin Test (PPD) Intradermal 8/4/2017 Not reviewed this visit You Were Diagnosed With   
  
 Codes Comments Confusion    -  Primary ICD-10-CM: R41.0 ICD-9-CM: 298.9 Epigastric pain     ICD-10-CM: R10.13 ICD-9-CM: 789.06 Vitals BP Pulse Resp Height(growth percentile) Weight(growth percentile) SpO2  
 145/69 (BP 1 Location: Right arm, BP Patient Position: Sitting) 71 15 4' 10\" (1.473 m) 94 lb (42.6 kg) 94% BMI OB Status Smoking Status 19.65 kg/m2 Postmenopausal Former Smoker Vitals History BMI and BSA Data Body Mass Index Body Surface Area 19.65 kg/m 2 1.32 m 2 Your Updated Medication List  
  
   
This list is accurate as of: 12/8/17  2:59 PM.  Always use your most recent med list.  
  
  
  
  
 aspirin delayed-release 81 mg tablet Take  by mouth daily. calcium carbonate-vitamin D3 600 mg(1,500mg) -800 unit Tab Take  by mouth. ciprofloxacin HCl 500 mg tablet Commonly known as:  CIPRO Take 1 Tab by mouth two (2) times a day for 3 days. donepezil 5 mg tablet Commonly known as:  ARICEPT Take 1 Tab by mouth nightly. For one month. If tolerated, increase to 10 mg daily after one month  
  
 metoprolol succinate 50 mg XL tablet Commonly known as:  TOPROL-XL Take  by mouth daily. NITROSTAT 0.4 mg SL tablet Generic drug:  nitroglycerin  
by SubLINGual route every five (5) minutes as needed for Chest Pain. Omeprazole delayed release 20 mg tablet Commonly known as:  PRILOSEC D/R Take 1 Tab by mouth daily. Indications: Heartburn  
  
 polyethylene glycol 17 gram/dose powder Commonly known as:  Al Deck Take 17 g by mouth daily as needed. Indications: constipation  
  
 ramipril 5 mg capsule Commonly known as:  ALTACE Take  by mouth daily. REFRESH LIQUIGEL 1 % Dlgl Generic drug:  carboxymethylcellulose sodium Apply  to eye. travoprost 0.004 % ophthalmic solution Commonly known as:  TRAVATAN Z Administer 1 Drop to both eyes every evening. VITAMIN D3 1,000 unit tablet Generic drug:  cholecalciferol Take  by mouth daily. Prescriptions Printed Refills  
 ciprofloxacin HCl (CIPRO) 500 mg tablet 0 Sig: Take 1 Tab by mouth two (2) times a day for 3 days. Class: Print Route: Oral  
 Omeprazole delayed release (PRILOSEC D/R) 20 mg tablet 3 Sig: Take 1 Tab by mouth daily. Indications: Heartburn Class: Print Route: Oral  
  
We Performed the Following AMB POC URINALYSIS DIP STICK AUTO W/ MICRO [39180 CPT(R)] Follow-up Instructions Return if symptoms worsen or fail to improve. To-Do List   
 12/08/2017 Microbiology:  CULTURE, URINE Introducing [facility]! Candie Ramachandran introduces ulike patient portal. Now you can access parts of your medical record, email your doctor's office, and request medication refills online. 1. In your internet browser, go to [email or web address]. [email or web address] 2. Click on the First Time User? Click Here link in the Sign In box. You will see the New Member Sign Up page. 3. Enter your ulike Access Code exactly as it appears below. You will not need to use this code after youve completed the sign-up process. If you do not sign up before the expiration date, you must request a new code. · ulike Access Code: [email or web address] Expires: 2/5/2018 11:14 AM 
 
4. Enter the last four digits of your Social Security Number (xxxx) and Date of Birth (mm/dd/yyyy) as indicated and click Submit. You will be taken to the next sign-up page. 5. Create a ulike ID. This will be your ulike login ID and cannot be changed, so think of one that is secure and easy to remember. 6. Create a ulike password. You can change your password at any time. 7. Enter your Password Reset Question and Answer. This can be used at a later time if you forget your password. 8. Enter your e-mail address. You will receive e-mail notification when new information is available in [address] 9. Click Sign Up. You can now view and download portions of your medical record. 10. Click the Download Summary menu link to download a portable copy of your medical information. If you have questions, please visit the Frequently Asked Questions section of the ulike website. Remember, ulike is NOT to be used for urgent needs. For medical emergencies, dial 911. Now available from your iPhone and Android! Please provide this summary of care documentation to your next provider. Your primary care clinician is listed as [address] [place] [address] Ave. If you have any questions after today's visit, please call [phone].

## 2017-12-08 NOTE — PROGRESS NOTES
ROOM # [address] presents today for No chief complaint on file. Yun Tolbert preferred language for health care discussion is english/other. Is someone accompanying this pt? Yes, daughter     Is the patient using any DME equipment during OV? No    Depression Screening:  PHQ over the last two weeks 8/7/2017   Little interest or pleasure in doing things Not at all   Feeling down, depressed or hopeless Not at all   Total Score PHQ 2 0       Learning Assessment:  Learning Assessment 8/7/2017   PRIMARY LEARNER Patient   HIGHEST LEVEL OF EDUCATION - PRIMARY LEARNER  SOME COLLEGE   PRIMARY LANGUAGE ENGLISH   LEARNER PREFERENCE PRIMARY READING   ANSWERED BY [employer]   RELATIONSHIP SELF       Abuse Screening:  Abuse Screening Questionnaire 8/7/2017   Do you ever feel afraid of your partner? N   Are you in a relationship with someone who physically or mentally threatens you? N   Is it safe for you to go home? Y       Fall Risk  Fall Risk Assessment, last 12 mths 8/7/2017   Able to walk? Yes   Fall in past 12 months? No       Health Maintenance reviewed and discussed per provider. Yes    Yun Tolbert is due for DTap, Zoster. Please order/place referral if appropria    Advance Directive:  1. Do you have an advance directive in place? Patient Reply: No    2. If not, would you like material regarding how to put one in place? Patient Reply: No    2. Per patient no changes to their ACP contact No.      Coordination of Care:  1. Have you been to the ER, urgent care clinic since your last visit? Hospitalized since your last visit? No    2. Have you seen or consulted any other health care providers outside of the [address] [place] [address] since your last visit? Include any pap smears or colon screening.  No

## 2017-12-08 NOTE — PROGRESS NOTES
HISTORY OF PRESENT ILLNESS  Abdiaziz Mc is a 80 y.o. female. HPI Comments: 79 yo female here for evaluation of possible UTI. Staff at her facility have reported to family some increased confusion from baseline. She denies any urinary sx, but daughter-in-law reports that staff states urine has foul odor to it. Pt does notes some upper abdominal discomfort but feels this is getting better. Daughter-in-law notes that pt seemed to have abdominal pain after eating lunch today but that this improved after belching. Pt had been receiving PPI until about 2 weeks ago which is when sx noticed. Review of Systems   Constitutional: Negative for chills, fever and weight loss. HENT: Negative for congestion and ear pain. Eyes: Negative for blurred vision and pain. Respiratory: Negative for cough and shortness of breath. Cardiovascular: Negative for chest pain, palpitations and leg swelling. Gastrointestinal: Positive for abdominal pain. Negative for nausea and vomiting. Genitourinary: Negative for dysuria, flank pain, frequency, hematuria and urgency. Musculoskeletal: Negative for joint pain and myalgias. Skin: Negative for itching and rash. Neurological: Negative for dizziness, tingling and headaches. Psychiatric/Behavioral: Negative for depression. The patient is not nervous/anxious. Past Medical History:   Diagnosis Date    Aortic heart valve narrowing     Chronic kidney disease     Femoral hernia     GERD (gastroesophageal reflux disease)     Glaucoma     Hip fracture, left ([address] 2013    Hypercholesterolemia     Hypertension     Lump in the abdomen     Macular degeneration     Myocardial infarct, old     Pulmonary HTN     Unstable chest pain due to insufficient blood supply to heart [facility])      Current Outpatient Prescriptions on File Prior to Visit   Medication Sig Dispense Refill    polyethylene glycol (MIRALAX) 17 gram/dose powder Take 17 g by mouth daily as needed. Indications: constipation 238 g 1    donepezil (ARICEPT) 5 mg tablet Take 1 Tab by mouth nightly. For one month. If tolerated, increase to 10 mg daily after one month 30 Tab 2    cholecalciferol (VITAMIN D3) 1,000 unit tablet Take  by mouth daily.  metoprolol succinate (TOPROL-XL) 50 mg XL tablet Take  by mouth daily.  aspirin delayed-release 81 mg tablet Take  by mouth daily.  calcium carbonate-vitamin D3 600 mg(1,500mg) -800 unit tab Take  by mouth.  travoprost (TRAVATAN Z) 0.004 % ophthalmic solution Administer 1 Drop to both eyes every evening.  ramipril (ALTACE) 5 mg capsule Take  by mouth daily.  carboxymethylcellulose sodium (REFRESH LIQUIGEL) 1 % dlgl Apply  to eye.  nitroglycerin (NITROSTAT) 0.4 mg SL tablet by SubLINGual route every five (5) minutes as needed for Chest Pain. No current facility-administered medications on file prior to visit. Social History   Substance Use Topics    Smoking status: Former Smoker    Smokeless tobacco: Never Used      Comment: approx 30 yrs ago    Alcohol use No      Comment: occasionally     Physical Exam   Constitutional: She appears well-developed and well-nourished. No distress. /69 (BP 1 Location: Right arm, BP Patient Position: Sitting)  Pulse 71  Resp 15  Ht 4' 10\" (1.473 m)  Wt 94 lb (42.6 kg)  SpO2 94%  BMI 19.65 kg/m2     Cardiovascular: Normal rate, regular rhythm and normal heart sounds. Exam reveals no gallop and no friction rub. No murmur heard. Pulmonary/Chest: Effort normal and breath sounds normal. No respiratory distress. She has no wheezes. She has no rales. Abdominal: Soft. Bowel sounds are normal. She exhibits no distension. There is tenderness (epigastric, mild discomfort lower abdomen). There is no rebound and no guarding. Musculoskeletal: She exhibits no edema or tenderness. Neurological: She is alert. She exhibits normal muscle tone. Skin: Skin is warm and dry. Psychiatric: She has a normal mood and affect. Urine dipstick: clear, neg blood, neg nitrites, 1+ LE    ASSESSMENT and PLAN    ICD-10-CM ICD-9-CM    1. Confusion R41.0 298.9 AMB POC URINALYSIS DIP STICK AUTO W/ MICRO      CULTURE, URINE   2. Epigastric pain R10.13 789.06      Pain more consistent with GERD/gastritis. Will resume daily omeprazole. UA not overly convincing for UTI but given report of foul odor to urine, mild suprapubic tenderness, will treat with course of Cipro.

## 2017-12-11 ENCOUNTER — HOSPITAL ENCOUNTER (OUTPATIENT)
Dept: LAB | Age: 82
Discharge: HOME | End: 2017-12-11
Payer: MEDICARE

## 2017-12-11 PROCEDURE — 87186 SC STD MICRODIL/AGAR DIL: CPT | Performed by: INTERNAL MEDICINE

## 2017-12-11 PROCEDURE — 87086 URINE CULTURE/COLONY COUNT: CPT | Performed by: INTERNAL MEDICINE

## 2017-12-11 PROCEDURE — 87077 CULTURE AEROBIC IDENTIFY: CPT | Performed by: INTERNAL MEDICINE

## 2017-12-14 NOTE — PROGRESS NOTES
Please let pt/family know that her urine culture did show bacteria, but this was susceptible to the antibiotic prescribed.

## 2017-12-15 LAB
BACTERIA SPEC CULT: ABNORMAL
SERVICE CMNT-IMP: ABNORMAL

## 2017-12-16 ENCOUNTER — TELEPHONE (OUTPATIENT)
Dept: INTERNAL MEDICINE CLINIC | Age: 82
End: 2017-12-16

## 2017-12-16 NOTE — TELEPHONE ENCOUNTER
Spoke with patients son regarding the lab results, informed family member that the bacteria shown in her urine is susceptible to the antibiotic that was prescribed at her last appointment with Dr. Henry Solano.

## 2017-12-16 NOTE — TELEPHONE ENCOUNTER
----- Message from Talib Ohara MD sent at 12/14/2017 12:25 PM EST -----  Please let pt/family know that her urine culture did show bacteria, but this was susceptible to the antibiotic prescribed.

## 2018-01-01 ENCOUNTER — OFFICE VISIT (OUTPATIENT)
Dept: INTERNAL MEDICINE CLINIC | Age: 83
End: 2018-01-01

## 2018-01-01 VITALS
OXYGEN SATURATION: 98 % | BODY MASS INDEX: 16.12 KG/M2 | RESPIRATION RATE: 16 BRPM | HEART RATE: 73 BPM | WEIGHT: 85.4 LBS | TEMPERATURE: 95 F | DIASTOLIC BLOOD PRESSURE: 51 MMHG | HEIGHT: 61 IN | SYSTOLIC BLOOD PRESSURE: 116 MMHG

## 2018-01-01 DIAGNOSIS — I10 ESSENTIAL HYPERTENSION: ICD-10-CM

## 2018-01-01 DIAGNOSIS — R63.4 WEIGHT LOSS: Primary | ICD-10-CM

## 2018-01-01 RX ORDER — CARBOXYMETHYLCELLULOSE SODIUM 10 MG/ML
GEL OPHTHALMIC
Qty: 15 ML | Refills: 3 | Status: SHIPPED | OUTPATIENT
Start: 2018-01-01

## 2018-02-14 ENCOUNTER — HOSPITAL ENCOUNTER (OUTPATIENT)
Dept: LAB | Age: 83
Discharge: HOME | End: 2018-02-14
Payer: MEDICARE

## 2018-02-14 ENCOUNTER — OFFICE VISIT (OUTPATIENT)
Dept: INTERNAL MEDICINE CLINIC | Age: 83
End: 2018-02-14

## 2018-02-14 VITALS
SYSTOLIC BLOOD PRESSURE: 147 MMHG | RESPIRATION RATE: 18 BRPM | WEIGHT: 90.8 LBS | HEART RATE: 63 BPM | TEMPERATURE: 95.8 F | DIASTOLIC BLOOD PRESSURE: 53 MMHG | OXYGEN SATURATION: 99 % | BODY MASS INDEX: 19.06 KG/M2 | HEIGHT: 58 IN

## 2018-02-14 DIAGNOSIS — K43.9 HERNIA OF ABDOMINAL WALL: ICD-10-CM

## 2018-02-14 DIAGNOSIS — G30.1 LATE ONSET ALZHEIMER'S DISEASE WITHOUT BEHAVIORAL DISTURBANCE (HCC): ICD-10-CM

## 2018-02-14 DIAGNOSIS — R10.31 RIGHT LOWER QUADRANT ABDOMINAL PAIN: Primary | ICD-10-CM

## 2018-02-14 DIAGNOSIS — R10.31 RIGHT LOWER QUADRANT ABDOMINAL PAIN: ICD-10-CM

## 2018-02-14 DIAGNOSIS — F02.80 LATE ONSET ALZHEIMER'S DISEASE WITHOUT BEHAVIORAL DISTURBANCE (HCC): ICD-10-CM

## 2018-02-14 LAB
ALBUMIN SERPL-MCNC: 3.5 G/DL (ref 3.4–5)
ALBUMIN/GLOB SERPL: 1.1 {RATIO} (ref 0.8–1.7)
ALP SERPL-CCNC: 103 U/L (ref 45–117)
ALT SERPL-CCNC: 14 U/L (ref 13–56)
ANION GAP SERPL CALC-SCNC: 8 MMOL/L (ref 3–18)
AST SERPL-CCNC: 13 U/L (ref 15–37)
BASOPHILS # BLD: 0 K/UL (ref 0–0.06)
BASOPHILS NFR BLD: 0 % (ref 0–2)
BILIRUB SERPL-MCNC: 0.4 MG/DL (ref 0.2–1)
BILIRUB UR QL STRIP: NEGATIVE
BUN SERPL-MCNC: 15 MG/DL (ref 7–18)
BUN/CREAT SERPL: 11 (ref 12–20)
CALCIUM SERPL-MCNC: 9.1 MG/DL (ref 8.5–10.1)
CHLORIDE SERPL-SCNC: 100 MMOL/L (ref 100–108)
CO2 SERPL-SCNC: 30 MMOL/L (ref 21–32)
CREAT SERPL-MCNC: 1.31 MG/DL (ref 0.6–1.3)
DIFFERENTIAL METHOD BLD: ABNORMAL
EOSINOPHIL # BLD: 0.1 K/UL (ref 0–0.4)
EOSINOPHIL NFR BLD: 2 % (ref 0–5)
ERYTHROCYTE [DISTWIDTH] IN BLOOD BY AUTOMATED COUNT: 14.8 % (ref 11.6–14.5)
GLOBULIN SER CALC-MCNC: 3.2 G/DL (ref 2–4)
GLUCOSE SERPL-MCNC: 83 MG/DL (ref 74–99)
GLUCOSE UR-MCNC: NEGATIVE MG/DL
HCT VFR BLD AUTO: 36.7 % (ref 35–45)
HGB BLD-MCNC: 11.6 G/DL (ref 12–16)
KETONES P FAST UR STRIP-MCNC: NEGATIVE MG/DL
LYMPHOCYTES # BLD: 1.3 K/UL (ref 0.9–3.6)
LYMPHOCYTES NFR BLD: 18 % (ref 21–52)
MCH RBC QN AUTO: 29.7 PG (ref 24–34)
MCHC RBC AUTO-ENTMCNC: 31.6 G/DL (ref 31–37)
MCV RBC AUTO: 93.9 FL (ref 74–97)
MONOCYTES # BLD: 0.7 K/UL (ref 0.05–1.2)
MONOCYTES NFR BLD: 10 % (ref 3–10)
NEUTS SEG # BLD: 4.9 K/UL (ref 1.8–8)
NEUTS SEG NFR BLD: 70 % (ref 40–73)
PH UR STRIP: 6.5 [PH] (ref 4.6–8)
PLATELET # BLD AUTO: 283 K/UL (ref 135–420)
PMV BLD AUTO: 10.1 FL (ref 9.2–11.8)
POTASSIUM SERPL-SCNC: 4.9 MMOL/L (ref 3.5–5.5)
PROT SERPL-MCNC: 6.7 G/DL (ref 6.4–8.2)
PROT UR QL STRIP: NEGATIVE
RBC # BLD AUTO: 3.91 M/UL (ref 4.2–5.3)
SODIUM SERPL-SCNC: 138 MMOL/L (ref 136–145)
SP GR UR STRIP: 1.01 (ref 1–1.03)
UA UROBILINOGEN AMB POC: NORMAL (ref 0.2–1)
URINALYSIS CLARITY POC: CLEAR
URINALYSIS COLOR POC: YELLOW
URINE BLOOD POC: NEGATIVE
URINE LEUKOCYTES POC: NEGATIVE
URINE NITRITES POC: NEGATIVE
WBC # BLD AUTO: 7 K/UL (ref 4.6–13.2)

## 2018-02-14 PROCEDURE — 85025 COMPLETE CBC W/AUTO DIFF WBC: CPT | Performed by: INTERNAL MEDICINE

## 2018-02-14 PROCEDURE — 80053 COMPREHEN METABOLIC PANEL: CPT | Performed by: INTERNAL MEDICINE

## 2018-02-14 PROCEDURE — 36415 COLL VENOUS BLD VENIPUNCTURE: CPT | Performed by: INTERNAL MEDICINE

## 2018-02-14 NOTE — LETTER
2/14/2018 2:41 PM 
 
Ms. Nidia Weeks amada [address] [phone] Please allow the above patient to take Renew Life Digestive Smart veggie enzyme as directed on bottle. This will be provided by her family. Sincerely, Jazzy Antoine MD

## 2018-02-14 NOTE — PROGRESS NOTES
HISTORY OF PRESENT ILLNESS  Estefania Mittal is a 80 y.o. female. HPI Comments: 81 yo female here for f/u of abdominal pain which has been occurring intermittently over the past few weeks. She notes epigastric discomfort at times with meals. Family also reports she has complained of RLQ pain at times. She has lost some weight. No BRBPR, melena. She does have large LLQ hernia which has recently enlarged. Some bulging. Has had some constipation. She was seen in urgent care Jan 21 with UTI. Not currently having urinary complaints. Family notes she has had some cognitive decline more noticeable over the past month or so. Review of Systems   Constitutional: Negative for chills, fever and weight loss. HENT: Negative for congestion and ear pain. Eyes: Negative for blurred vision and pain. Respiratory: Negative for cough and shortness of breath. Cardiovascular: Negative for chest pain, palpitations and leg swelling. Gastrointestinal: Positive for abdominal pain (intermittent, often with meals). Negative for blood in stool, diarrhea, melena, nausea and vomiting. Genitourinary: Negative for dysuria, flank pain, frequency and urgency. Musculoskeletal: Negative for joint pain and myalgias. Skin: Negative for itching and rash. Neurological: Negative for dizziness, tingling and headaches. Psychiatric/Behavioral: Positive for memory loss. Negative for depression. The patient is not nervous/anxious.       Past Medical History:   Diagnosis Date    Aortic heart valve narrowing     Chronic kidney disease     Femoral hernia     GERD (gastroesophageal reflux disease)     Glaucoma     Hip fracture, left ([facility] [address] 2013    Hypercholesterolemia     Hypertension     Lump in the abdomen     Macular degeneration     Myocardial infarct, old     Pulmonary HTN     Unstable chest pain due to insufficient blood supply to heart [facility])      Current Outpatient Prescriptions on File Prior to Visit   Medication Sig Dispense Refill    Omeprazole delayed release (PRILOSEC D/R) 20 mg tablet Take 1 Tab by mouth daily. Indications: Heartburn 90 Tab 3    polyethylene glycol (MIRALAX) 17 gram/dose powder Take 17 g by mouth daily as needed. Indications: constipation 238 g 1    donepezil (ARICEPT) 5 mg tablet Take 1 Tab by mouth nightly. For one month. If tolerated, increase to 10 mg daily after one month 30 Tab 2    cholecalciferol (VITAMIN D3) 1,000 unit tablet Take  by mouth daily.  metoprolol succinate (TOPROL-XL) 50 mg XL tablet Take  by mouth daily.  aspirin delayed-release 81 mg tablet Take  by mouth daily.  calcium carbonate-vitamin D3 600 mg(1,500mg) -800 unit tab Take  by mouth.  travoprost (TRAVATAN Z) 0.004 % ophthalmic solution Administer 1 Drop to both eyes every evening.  ramipril (ALTACE) 5 mg capsule Take  by mouth daily.  carboxymethylcellulose sodium (REFRESH LIQUIGEL) 1 % dlgl Apply  to eye.  nitroglycerin (NITROSTAT) 0.4 mg SL tablet by SubLINGual route every five (5) minutes as needed for Chest Pain. No current facility-administered medications on file prior to visit. Social History   Substance Use Topics    Smoking status: Former Smoker    Smokeless tobacco: Never Used      Comment: approx 30 yrs ago    Alcohol use No      Comment: occasionally     Physical Exam   Constitutional: She appears well-developed and well-nourished. No distress. /53 (BP 1 Location: Left arm, BP Patient Position: Sitting)  Pulse 63  Temp 95.8 °F (35.4 °C) (Oral)   Resp 18  Ht 4' 10\" (1.473 m)  Wt 90 lb 12.8 oz (41.2 kg)  SpO2 99%  BMI 18.98 kg/m2     Eyes: EOM are normal. Right eye exhibits no discharge. Left eye exhibits no discharge. No scleral icterus. Neck: Neck supple. Cardiovascular: Normal rate and regular rhythm. Exam reveals no gallop and no friction rub. Murmur heard. Pulmonary/Chest: Effort normal and breath sounds normal. No respiratory distress.  She has no wheezes. She has no rales. Abdominal: Soft. She exhibits no distension. There is no tenderness. There is no guarding. A hernia (Llq) is present. Musculoskeletal: She exhibits no edema or tenderness. Lymphadenopathy:     She has no cervical adenopathy. Neurological: She is alert. She exhibits normal muscle tone. Skin: Skin is warm and dry. Psychiatric: She has a normal mood and affect. No results found for: NA, K, CL, CO2, AGAP, GLU, BUN, CREA, BUCR, GFRAA, GFRNA, CA, TBIL, TBILI, GPT, SGOT, AP, TP, ALB, GLOB, AGRAT, ALT   No results found for: WBC, WBCLT, HGBPOC, HGB, HGBP, HCTPOC, HCT, PHCT, RBCH, PLT, MCV, HGBEXT, HCTEXT, PLTEXT    ASSESSMENT and PLAN    ICD-10-CM ICD-9-CM    1. Right lower quadrant abdominal pain R10.31 789.03 CBC WITH AUTOMATED DIFF      METABOLIC PANEL, COMPREHENSIVE      AMB POC URINALYSIS DIP STICK AUTO W/O MICRO      CANCELED: AMB POC URINALYSIS DIP STICK AUTO W/ MICRO   2. Hernia of abdominal wall K43.9 553.20 REFERRAL TO SURGERY   3. Late onset Alzheimer's disease without behavioral disturbance G30.1 331.0     F02.80 294.10      Large hernia on exam, but this is not location of her pain. ? Mesenteric ischemia given association with meals. ? Gastritis. Discussed evaluation options which includes no further testing, CT, surgery evaluation. Will check labs today. Referral entered to surgery for further input. Will continue current medication for now. Okay to try OTC bowel product.

## 2018-02-14 NOTE — MR AVS SNAPSHOT
[facility] 
 
 
 [address] [address] 
[phone] Patient: Patience Man MRN: [id number]:[id number] Visit Information Date & Time Provider Department Dept. Phone Encounter #  
 2/14/2018  1:45 PM [facility] [id number] Follow-up Instructions Return if symptoms worsen or fail to improve. Your Appointments 3/12/2018 11:15 AM  
Office Visit with MD LEONEL Martell [facility]) Appt Note: 4 mo f/u; 4 mo f/u  
 [address]  
  
   
 [address] [facility] Upcoming Health Maintenance Date Due DTaP/Tdap/Td series (1 - Tdap) 1/10/1949 ZOSTER VACCINE AGE 60> 11/10/1987 GLAUCOMA SCREENING Q2Y 1/10/1993 OSTEOPOROSIS SCREENING (DEXA) 1/10/1993 MEDICARE YEARLY EXAM 11/8/2018 Allergies as of 2/14/2018  Review Complete On: 12/8/2017 By: Nitish Vallejo MD  
 No Known Allergies Current Immunizations  Reviewed on 11/1/2017 Name Date Influenza High Dose Vaccine PF 11/1/2017 Influenza Vaccine 10/18/2012 12:00 AM  
 Pneumococcal Conjugate (PCV-13) 5/28/2015 Pneumococcal Polysaccharide (PPSV-23) 10/18/2012 12:00 AM, 10/29/2007 TB Skin Test (PPD) Intradermal 8/4/2017 Not reviewed this visit You Were Diagnosed With   
  
 Codes Comments Right lower quadrant abdominal pain    -  Primary ICD-10-CM: R10.31 ICD-9-CM: 789.03 Hernia of abdominal wall     ICD-10-CM: K43.9 ICD-9-CM: 553.20 Late onset Alzheimer's disease without behavioral disturbance     ICD-10-CM: G30.1, F02.80 ICD-9-CM: 331.0, 294.10 Vitals BP Pulse Temp Resp Height(growth percentile) Weight(growth percentile)  147/53 (BP 1 Location: Left arm, BP Patient Position: Sitting) 63 95.8 °F (35.4 °C) (Oral) 18 4' 10\" (1.473 m) 90 lb 12.8 oz (41.2 kg) SpO2 BMI OB Status Smoking Status 99% 18.98 kg/m2 Postmenopausal Former Smoker Vitals History BMI and BSA Data Body Mass Index Body Surface Area 18.98 kg/m 2 1.3 m 2 Your Updated Medication List  
  
   
This list is accurate as of: 2/14/18  2:19 PM.  Always use your most recent med list.  
  
  
  
  
 aspirin delayed-release 81 mg tablet Take  by mouth daily. calcium carbonate-vitamin D3 600 mg(1,500mg) -800 unit Tab Take  by mouth. donepezil 5 mg tablet Commonly known as:  ARICEPT Take 1 Tab by mouth nightly. For one month. If tolerated, increase to 10 mg daily after one month  
  
 metoprolol succinate 50 mg XL tablet Commonly known as:  TOPROL-XL Take  by mouth daily. NITROSTAT 0.4 mg SL tablet Generic drug:  nitroglycerin  
by SubLINGual route every five (5) minutes as needed for Chest Pain. Omeprazole delayed release 20 mg tablet Commonly known as:  PRILOSEC D/R Take 1 Tab by mouth daily. Indications: Heartburn  
  
 polyethylene glycol 17 gram/dose powder Commonly known as:  Diane Canter Take 17 g by mouth daily as needed. Indications: constipation  
  
 ramipril 5 mg capsule Commonly known as:  ALTACE Take  by mouth daily. REFRESH LIQUIGEL 1 % Dlgl Generic drug:  carboxymethylcellulose sodium Apply  to eye. travoprost 0.004 % ophthalmic solution Commonly known as:  TRAVATAN Z Administer 1 Drop to both eyes every evening. VITAMIN D3 1,000 unit tablet Generic drug:  cholecalciferol Take  by mouth daily. We Performed the Following AMB POC URINALYSIS DIP STICK AUTO W/ MICRO [66359 CPT(R)] REFERRAL TO SURGERY [JRG022 Custom] Comments:  
 Please evaluate patient for larger LLQ hernia, intermittent abdominal pain. Best POC son Lolis Izquierdo [phone] Follow-up Instructions Return if symptoms worsen or fail to improve. Referral Information Referral ID Referred By Referred To  
  
 [id number] Shai RODRÍGUEZ MD   
   [address] [facility] [address] [place] [address] Hebert Chambers [address]ch[address] [facility] [address] Phone: [phone] Fax: [phone] Visits Status Start Date End Date 1 New Request 2/14/18 2/14/19 If your referral has a status of pending review or denied, additional information will be sent to support the outcome of this decision. Patient Instructions Abdominal Pain: Care Instructions Your Care Instructions Abdominal pain has many possible causes. Some aren't serious and get better on their own in a few days. Others need more testing and treatment. If your pain continues or gets worse, you need to be rechecked and may need more tests to find out what is wrong. You may need surgery to correct the problem. Don't ignore new symptoms, such as fever, nausea and vomiting, urination problems, pain that gets worse, and dizziness. These may be signs of a more serious problem. Your doctor may have recommended a follow-up visit in the next 8 to 12 hours. If you are not getting better, you may need more tests or treatment. The doctor has checked you carefully, but problems can develop later. If you notice any problems or new symptoms, get medical treatment right away. Follow-up care is a key part of your treatment and safety. Be sure to make and go to all appointments, and call your doctor if you are having problems. It's also a good idea to know your test results and keep a list of the medicines you take. How can you care for yourself at home? · Rest until you feel better. · To prevent dehydration, drink plenty of fluids, enough so that your urine is light yellow or clear like water. Choose water and other caffeine-free clear liquids until you feel better.  If you have kidney, heart, or liver disease and have to limit fluids, talk with your doctor before you increase the amount of fluids you drink. · If your stomach is upset, eat mild foods, such as rice, dry toast or crackers, bananas, and applesauce. Try eating several small meals instead of two or three large ones. · Wait until 48 hours after all symptoms have gone away before you have spicy foods, alcohol, and drinks that contain caffeine. · Do not eat foods that are high in fat. · Avoid anti-inflammatory medicines such as aspirin, ibuprofen (Advil, Motrin), and naproxen (Aleve). These can cause stomach upset. Talk to your doctor if you take daily aspirin for another health problem. When should you call for help? Call 911 anytime you think you may need emergency care. For example, call if: 
? · You passed out (lost consciousness). ? · You pass maroon or very bloody stools. ? · You vomit blood or what looks like coffee grounds. ? · You have new, severe belly pain. ?Call your doctor now or seek immediate medical care if: 
? · Your pain gets worse, especially if it becomes focused in one area of your belly. ? · You have a new or higher fever. ? · Your stools are black and look like tar, or they have streaks of blood. ? · You have unexpected vaginal bleeding. ? · You have symptoms of a urinary tract infection. These may include: 
¨ Pain when you urinate. ¨ Urinating more often than usual. 
¨ Blood in your urine. ? · You are dizzy or lightheaded, or you feel like you may faint. ? Watch closely for changes in your health, and be sure to contact your doctor if: 
? · You are not getting better after 1 day (24 hours). Where can you learn more? Go to http://anneliese-natalio.info/. Enter J354 in the search box to learn more about \"Abdominal Pain: Care Instructions. \" Current as of: March 20, 2017 Content Version: 11.4 © 5054-7952 Healthwise, Incorporated.  Care instructions adapted under license by [address] (which disclaims liability or warranty for this information). If you have questions about a medical condition or this instruction, always ask your healthcare professional. [address] any warranty or liability for your use of this information. Introducing [facility]! [employer] introduces [employer] patient portal. Now you can access parts of your medical record, email your doctor's office, and request medication refills online. 1. In your internet browser, go to [email or web address] 2. Click on the First Time User? Click Here link in the Sign In box. You will see the New Member Sign Up page. 3. Enter your [employer] Access Code exactly as it appears below. You will not need to use this code after youve completed the sign-up process. If you do not sign up before the expiration date, you must request a new code. · [employer] Access Code: [email or web address] Expires: 5/15/2018  2:19 PM 
 
4. Enter the last four digits of your Social Security Number (xxxx) and Date of Birth (mm/dd/yyyy) as indicated and click Submit. You will be taken to the next sign-up page. 5. Create a [employer] ID. This will be your [employer] login ID and cannot be changed, so think of one that is secure and easy to remember. 6. Create a [employer] password. You can change your password at any time. 7. Enter your Password Reset Question and Answer. This can be used at a later time if you forget your password. 8. Enter your e-mail address. You will receive e-mail notification when new information is available in [address] 9. Click Sign Up. You can now view and download portions of your medical record. 10. Click the Download Summary menu link to download a portable copy of your medical information. If you have questions, please visit the Frequently Asked Questions section of the [employer] website.  Remember, [employer] is NOT to be used for urgent needs. For medical emergencies, dial 911. Now available from your iPhone and Android! Please provide this summary of care documentation to your next provider. Your primary care clinician is listed as Junaid Chambers. If you have any questions after today's visit, please call [phone].

## 2018-02-14 NOTE — LETTER
2/21/2018 Ms. Feliciano Jackson [facility] [address] [facility] [address] [id number] Dear Feliciano Jackson: 
 
Please find your most recent results below. Resulted Orders CBC WITH AUTOMATED DIFF Result Value Ref Range WBC 7.0 4.6 - 13.2 K/uL  
 RBC 3.91 (L) 4.20 - 5.30 M/uL  
 HGB 11.6 (L) 12.0 - 16.0 g/dL HCT 36.7 35.0 - 45.0 % MCV 93.9 74.0 - 97.0 FL  
 MCH 29.7 24.0 - 34.0 PG  
 MCHC 31.6 31.0 - 37.0 g/dL  
 RDW 14.8 (H) 11.6 - 14.5 % PLATELET 180 680 - 619 K/uL MPV 10.1 9.2 - 11.8 FL  
 NEUTROPHILS 70 40 - 73 % LYMPHOCYTES 18 (L) 21 - 52 % MONOCYTES 10 3 - 10 % EOSINOPHILS 2 0 - 5 % BASOPHILS 0 0 - 2 %  
 ABS. NEUTROPHILS 4.9 1.8 - 8.0 K/UL  
 ABS. LYMPHOCYTES 1.3 0.9 - 3.6 K/UL  
 ABS. MONOCYTES 0.7 0.05 - 1.2 K/UL  
 ABS. EOSINOPHILS 0.1 0.0 - 0.4 K/UL  
 ABS. BASOPHILS 0.0 0.0 - 0.06 K/UL  
 DF AUTOMATED METABOLIC PANEL, COMPREHENSIVE Result Value Ref Range Sodium 138 136 - 145 mmol/L Potassium 4.9 3.5 - 5.5 mmol/L Chloride 100 100 - 108 mmol/L  
 CO2 30 21 - 32 mmol/L Anion gap 8 3.0 - 18 mmol/L Glucose 83 74 - 99 mg/dL BUN 15 7.0 - 18 MG/DL Creatinine 1.31 (H) 0.6 - 1.3 MG/DL  
 BUN/Creatinine ratio 11 (L) 12 - 20 GFR est AA 46 (L) >60 ml/min/1.73m2 GFR est non-AA 38 (L) >60 ml/min/1.73m2 Comment:  
   (NOTE) Estimated GFR is calculated using the Modification of Diet in Renal  
Disease (MDRD) Study equation, reported for both  Americans [facility]) and non- Americans (GFRNA), and normalized to 1.73m2  
body surface area. The physician must decide which value applies to  
the patient. The MDRD study equation should only be used in  
individuals age 25 or older. It has not been validated for the  
following: pregnant women, patients with serious comorbid conditions,  
or on certain medications, or persons with extremes of body size,  
muscle mass, or nutritional status.  
  
 Calcium 9.1 8.5 - 10.1 MG/DL  
 Bilirubin, total 0.4 0.2 - 1.0 MG/DL  
 ALT (SGPT) 14 13 - 56 U/L  
 AST (SGOT) 13 (L) 15 - 37 U/L Alk. phosphatase 103 45 - 117 U/L Protein, total 6.7 6.4 - 8.2 g/dL Albumin 3.5 3.4 - 5.0 g/dL Globulin 3.2 2.0 - 4.0 g/dL A-G Ratio 1.1 0.8 - 1.7 RECOMMENDATIONS: 
 
 
Please call me if you have any questions: [phone] Sincerely, 
 
 
Dr. Smith/ melly

## 2018-02-14 NOTE — PATIENT INSTRUCTIONS
Abdominal Pain: Care Instructions  Your Care Instructions    Abdominal pain has many possible causes. Some aren't serious and get better on their own in a few days. Others need more testing and treatment. If your pain continues or gets worse, you need to be rechecked and may need more tests to find out what is wrong. You may need surgery to correct the problem. Don't ignore new symptoms, such as fever, nausea and vomiting, urination problems, pain that gets worse, and dizziness. These may be signs of a more serious problem. Your doctor may have recommended a follow-up visit in the next 8 to 12 hours. If you are not getting better, you may need more tests or treatment. The doctor has checked you carefully, but problems can develop later. If you notice any problems or new symptoms, get medical treatment right away. Follow-up care is a key part of your treatment and safety. Be sure to make and go to all appointments, and call your doctor if you are having problems. It's also a good idea to know your test results and keep a list of the medicines you take. How can you care for yourself at home? · Rest until you feel better. · To prevent dehydration, drink plenty of fluids, enough so that your urine is light yellow or clear like water. Choose water and other caffeine-free clear liquids until you feel better. If you have kidney, heart, or liver disease and have to limit fluids, talk with your doctor before you increase the amount of fluids you drink. · If your stomach is upset, eat mild foods, such as rice, dry toast or crackers, bananas, and applesauce. Try eating several small meals instead of two or three large ones. · Wait until 48 hours after all symptoms have gone away before you have spicy foods, alcohol, and drinks that contain caffeine. · Do not eat foods that are high in fat. · Avoid anti-inflammatory medicines such as aspirin, ibuprofen (Advil, Motrin), and naproxen (Aleve).  These can cause stomach upset. Talk to your doctor if you take daily aspirin for another health problem. When should you call for help? Call 911 anytime you think you may need emergency care. For example, call if:  ? · You passed out (lost consciousness). ? · You pass maroon or very bloody stools. ? · You vomit blood or what looks like coffee grounds. ? · You have new, severe belly pain. ?Call your doctor now or seek immediate medical care if:  ? · Your pain gets worse, especially if it becomes focused in one area of your belly. ? · You have a new or higher fever. ? · Your stools are black and look like tar, or they have streaks of blood. ? · You have unexpected vaginal bleeding. ? · You have symptoms of a urinary tract infection. These may include:  ¨ Pain when you urinate. ¨ Urinating more often than usual.  ¨ Blood in your urine. ? · You are dizzy or lightheaded, or you feel like you may faint. ? Watch closely for changes in your health, and be sure to contact your doctor if:  ? · You are not getting better after 1 day (24 hours). Where can you learn more? Go to http://anneliese-natalio.info/. Enter Q022 in the search box to learn more about \"Abdominal Pain: Care Instructions. \"  Current as of: March 20, 2017  Content Version: 11.4  © 3716-9385 Scan. Care instructions adapted under license by [employer] (which disclaims liability or warranty for this information). If you have questions about a medical condition or this instruction, always ask your healthcare professional. [place] [address] any warranty or liability for your use of this information.

## 2018-02-15 NOTE — PROGRESS NOTES
Please let pt's son Daly Iraheta [phone]) know that pt's labs look okay. She has a slight reduction of her kidney function and hemoglobin.

## 2018-02-19 ENCOUNTER — OFFICE VISIT (OUTPATIENT)
Dept: SURGERY | Age: 83
End: 2018-02-19

## 2018-02-19 VITALS
RESPIRATION RATE: 16 BRPM | DIASTOLIC BLOOD PRESSURE: 53 MMHG | HEIGHT: 58 IN | TEMPERATURE: 95.7 F | HEART RATE: 59 BPM | WEIGHT: 90.8 LBS | SYSTOLIC BLOOD PRESSURE: 148 MMHG | BODY MASS INDEX: 19.06 KG/M2 | OXYGEN SATURATION: 97 %

## 2018-02-19 DIAGNOSIS — K43.9 ABDOMINAL WALL HERNIA: Primary | ICD-10-CM

## 2018-02-19 NOTE — PATIENT INSTRUCTIONS
If you have any questions or concerns about today's appointment, the verbal and/or written instructions you were given for follow up care, please call our office at [phone].     [facility] - [address]    [phone] office  [phone]

## 2018-02-19 NOTE — MR AVS SNAPSHOT
[address] [place] [address] [address] 
[phone] Patient: Alanna Alicia MRN: [id number] OUI:6[birth year] Visit Information Date & Time Provider Department Dept. Phone Encounter #  
 2/19/2018 11:45 AM Estuardo Aguirre MD [facility] [phone] [id number] Your Appointments 3/12/2018 11:15 AM  
Office Visit with MD LEONEL Katz [facility]) Appt Note: 4 mo f/u; 4 mo f/u  
 [address] [place]  
  
   
 [address] [facility] Upcoming Health Maintenance Date Due DTaP/Tdap/Td series (1 - Tdap) 1/10/1949 ZOSTER VACCINE AGE 60> 11/10/1987 GLAUCOMA SCREENING Q2Y 1/10/1993 OSTEOPOROSIS SCREENING (DEXA) 1/10/1993 MEDICARE YEARLY EXAM 11/8/2018 Allergies as of 2/19/2018  Review Complete On: 2/19/2018 By: Saleem Erwin No Known Allergies Current Immunizations  Reviewed on 11/1/2017 Name Date Influenza High Dose Vaccine PF 11/1/2017 Influenza Vaccine 10/18/2012 12:00 AM  
 Pneumococcal Conjugate (PCV-13) 5/28/2015 Pneumococcal Polysaccharide (PPSV-23) 10/18/2012 12:00 AM, 10/29/2007 TB Skin Test (PPD) Intradermal 8/4/2017 Not reviewed this visit Vitals BP Pulse Temp Resp Height(growth percentile) Weight(growth percentile) 148/53 (BP 1 Location: Left arm, BP Patient Position: Sitting) (!) 59 95.7 °F (35.4 °C) (Oral) 16 4' 10\" (1.473 m) 90 lb 12.8 oz (41.2 kg) SpO2 BMI OB Status Smoking Status 97% 18.98 kg/m2 Postmenopausal Former Smoker Vitals History BMI and BSA Data Body Mass Index Body Surface Area 18.98 kg/m 2 1.3 m 2 Your Updated Medication List  
  
   
This list is accurate as of: 2/19/18 12:02 PM.  Always use your most recent med list.  
  
  
  
  
 aspirin delayed-release 81 mg tablet Take  by mouth daily. calcium carbonate-vitamin D3 600 mg(1,500mg) -800 unit Tab Take  by mouth. donepezil 5 mg tablet Commonly known as:  ARICEPT Take 1 Tab by mouth nightly. For one month. If tolerated, increase to 10 mg daily after one month  
  
 metoprolol succinate 50 mg XL tablet Commonly known as:  TOPROL-XL Take  by mouth daily. NITROSTAT 0.4 mg SL tablet Generic drug:  nitroglycerin  
by SubLINGual route every five (5) minutes as needed for Chest Pain. Omeprazole delayed release 20 mg tablet Commonly known as:  PRILOSEC D/R Take 1 Tab by mouth daily. Indications: Heartburn  
  
 polyethylene glycol 17 gram/dose powder Commonly known as:  Shawn Mano Take 17 g by mouth daily as needed. Indications: constipation  
  
 ramipril 5 mg capsule Commonly known as:  ALTACE Take  by mouth daily. REFRESH LIQUIGEL 1 % Dlgl Generic drug:  carboxymethylcellulose sodium Apply  to eye. travoprost 0.004 % ophthalmic solution Commonly known as:  TRAVATAN Z Administer 1 Drop to both eyes every evening. VITAMIN D3 1,000 unit tablet Generic drug:  cholecalciferol Take  by mouth daily. Patient Instructions If you have any questions or concerns about today's appointment, the verbal and/or written instructions you were given for follow up care, please call our office at [phone]. [facility] - [place] [phone] office [phone] Introducing [facility] & HEALTH SERVICES! [facility] introduces [employer] patient portal. Now you can access parts of your medical record, email your doctor's office, and request medication refills online. 1. In your internet browser, go to [email or web address] 2. Click on the First Time User? Click Here link in the Sign In box.  You will see the New Member Sign Up page. 3. Enter your NewsiT Access Code exactly as it appears below. You will not need to use this code after youve completed the sign-up process. If you do not sign up before the expiration date, you must request a new code. · NewsiT Access Code: [email or web address] Expires: 5/15/2018  2:19 PM 
 
4. Enter the last four digits of your Social Security Number (xxxx) and Date of Birth (mm/dd/yyyy) as indicated and click Submit. You will be taken to the next sign-up page. 5. Create a NewsiT ID. This will be your NewsiT login ID and cannot be changed, so think of one that is secure and easy to remember. 6. Create a NewsiT password. You can change your password at any time. 7. Enter your Password Reset Question and Answer. This can be used at a later time if you forget your password. 8. Enter your e-mail address. You will receive e-mail notification when new information is available in [facility] 9. Click Sign Up. You can now view and download portions of your medical record. 10. Click the Download Summary menu link to download a portable copy of your medical information. If you have questions, please visit the Frequently Asked Questions section of the NewsiT website. Remember, NewsiT is NOT to be used for urgent needs. For medical emergencies, dial 911. Now available from your iPhone and Android! Please provide this summary of care documentation to your next provider. Your primary care clinician is listed as Junaid Chambers. If you have any questions after today's visit, please call [phone].

## 2018-02-19 NOTE — PROGRESS NOTES
General Surgery Consult      Deejay Adame  Admit date: (Not on file)    MRN: [id number]     : 1/10/1928     Age: 80 y.o. Attending Physician: Barbie Pool MD, [facility]      History of Present Illness:     Deejay Adame is a 80year-old female, who is presenting with a picture of symptomatic abdominal wall hernia. The patient had a bulge for about 3 years. She is also been having abdominal pain. The pain is mainly happening after eating, for which she has to lay down and this would relieve the pain. She said that when the bulge is big her pain is more severe, and one it is partially reducible it is less painful. She also has constipation and her abdominal pain is worse during and after a bowel movement. Pain is dull, intermittent. The mass is reducible. She does not have a history of incarceration or obstruction. The patient also gives a history of constipation.      Patient Active Problem List    Diagnosis Date Noted    Late onset Alzheimer's disease without behavioral disturbance 2017    Essential hypertension 2017    Aortic stenosis, severe 2017    Gastroesophageal reflux disease without esophagitis 2017     Past Medical History:   Diagnosis Date    Aortic heart valve narrowing     Chronic kidney disease     Femoral hernia     GERD (gastroesophageal reflux disease)     Glaucoma     Hip fracture, left ([address]     Hypercholesterolemia     Hypertension     Lump in the abdomen     Macular degeneration     Myocardial infarct, old     Pulmonary HTN     Unstable chest pain due to insufficient blood supply to heart [facility])       Past Surgical History:   Procedure Laterality Date    CARDIAC SURG PROCEDURE UNLIST      double bypass      Social History   Substance Use Topics    Smoking status: Former Smoker    Smokeless tobacco: Never Used      Comment: approx 30 yrs ago    Alcohol use No      Comment: occasionally      History   Smoking Status  Former Smoker   Smokeless Tobacco    Never Used     Comment: approx 30 yrs ago     Family History   Problem Relation Age of Onset    Heart Attack Mother     Heart Attack Father       Current Outpatient Prescriptions   Medication Sig    Omeprazole delayed release (PRILOSEC D/R) 20 mg tablet Take 1 Tab by mouth daily. Indications: Heartburn    polyethylene glycol (MIRALAX) 17 gram/dose powder Take 17 g by mouth daily as needed. Indications: constipation    donepezil (ARICEPT) 5 mg tablet Take 1 Tab by mouth nightly. For one month. If tolerated, increase to 10 mg daily after one month    cholecalciferol (VITAMIN D3) 1,000 unit tablet Take  by mouth daily.  metoprolol succinate (TOPROL-XL) 50 mg XL tablet Take  by mouth daily.  aspirin delayed-release 81 mg tablet Take  by mouth daily.  calcium carbonate-vitamin D3 600 mg(1,500mg) -800 unit tab Take  by mouth.  travoprost (TRAVATAN Z) 0.004 % ophthalmic solution Administer 1 Drop to both eyes every evening.  ramipril (ALTACE) 5 mg capsule Take  by mouth daily.  carboxymethylcellulose sodium (REFRESH LIQUIGEL) 1 % dlgl Apply  to eye.  nitroglycerin (NITROSTAT) 0.4 mg SL tablet by SubLINGual route every five (5) minutes as needed for Chest Pain. No current facility-administered medications for this visit.        No Known Allergies     Review of Systems:  Constitutional: negative  Eyes: negative  Ears, Nose, Mouth, Throat, and Face: negative  Respiratory: negative  Cardiovascular: negative  Gastrointestinal: positive for constipation and abdominal pain  Genitourinary:negative  Integument/Breast: negative  Hematologic/Lymphatic: negative  Musculoskeletal:negative  Neurological: negative  Behavioral/Psychiatric: negative  Endocrine: negative  Allergic/Immunologic: negative    Objective:     Visit Vitals    /53 (BP 1 Location: Left arm, BP Patient Position: Sitting)    Pulse (!) 59    Temp 95.7 °F (35.4 °C) (Oral)    Resp 16    Ht 4' 10\" (1.473 m)    Wt 41.2 kg (90 lb 12.8 oz)    SpO2 97%    BMI 18.98 kg/m2       Physical Exam:      General:  in no apparent distress, alert, oriented times 3 and afebrile   Eyes:  conjunctivae and sclerae normal, pupils equal, round, reactive to light   Throat & Neck: no erythema or exudates noted and neck supple and symmetrical; no palpable masses   Lungs:   clear to auscultation bilaterally   Heart:  Regular rate and rhythm   Abdomen:   flat, soft, nontender, nondistended, no masses or organomegaly. There is a large abdominal wall hernia seems to be located in the left lower quadrant, I am not sure if it is an inguinal or spigelian hernia,  it is mildly tender to palpation and partially reducible   Extremities: extremities normal, atraumatic, no cyanosis or edema   Skin: Normal.       Imaging and Lab Review:     CBC:   Lab Results   Component Value Date/Time    WBC 7.0 02/14/2018 02:17 PM    RBC 3.91 (L) 02/14/2018 02:17 PM    HGB 11.6 (L) 02/14/2018 02:17 PM    HCT 36.7 02/14/2018 02:17 PM    PLATELET 699 22/32/8288 02:17 PM     BMP:   Lab Results   Component Value Date/Time    Glucose 83 02/14/2018 02:17 PM    Sodium 138 02/14/2018 02:17 PM    Potassium 4.9 02/14/2018 02:17 PM    Chloride 100 02/14/2018 02:17 PM    CO2 30 02/14/2018 02:17 PM    BUN 15 02/14/2018 02:17 PM    Creatinine 1.31 (H) 02/14/2018 02:17 PM    Calcium 9.1 02/14/2018 02:17 PM     CMP:  Lab Results   Component Value Date/Time    Glucose 83 02/14/2018 02:17 PM    Sodium 138 02/14/2018 02:17 PM    Potassium 4.9 02/14/2018 02:17 PM    Chloride 100 02/14/2018 02:17 PM    CO2 30 02/14/2018 02:17 PM    BUN 15 02/14/2018 02:17 PM    Creatinine 1.31 (H) 02/14/2018 02:17 PM    Calcium 9.1 02/14/2018 02:17 PM    Anion gap 8 02/14/2018 02:17 PM    BUN/Creatinine ratio 11 (L) 02/14/2018 02:17 PM    Alk.  phosphatase 103 02/14/2018 02:17 PM    Protein, total 6.7 02/14/2018 02:17 PM    Albumin 3.5 02/14/2018 02:17 PM    Globulin 3.2 02/14/2018 02:17 PM    A-G Ratio 1.1 02/14/2018 02:17 PM       No results found for this or any previous visit (from the past 24 hour(s)). images and reports reviewed    Assessment   Libertad Shown is a 80 y.o. female is presenting with a picture of symptomatic abdominal wall hernia. I think that her abdominal pain are related to this large hernia. I explained to the patient and to her son and her daughter and low that though the hernia looks huge the defect is small and the chance of strangulation is high. However the patient has aortic stenosis and she will need to get medical clearance before the surgery. She is going to see her cardiology in the next few weeks. I Discussed the possibility of strangulation, enlargement in size over time, and the risk of emergency surgery in the face of strangulation. I also discussed the use of prosthetic materials (mesh), including the risk of infection. Also discussed the risk of surgery including recurrence and the possible need for reoperation and removal of mesh if used, possibility of postoperative small bowel injury, obstruction or ileus, and the risks of general anesthetic. I explained to the the patient about the robotic hernia repair procedure. Plan:     Medical clearance for general anesthesia  If she is cleared, than we will schedule for robotic, possible open, hernia repair with placement of mesh.     Please call me if you have any questions (cell phone: [phone])     Signed By: Helder Cochran MD     February 19, 2018    Is a whole

## 2018-02-19 NOTE — PROGRESS NOTES
Tyler Aburto is a 80 y.o. female who presents for a surgical evaluation of a LLQ abdominal hernia. Patient verbally agrees to permit the medical students working in [id number] office to observe and participate in surgical care during the appointment today, including, where appropriate, providing direct surgical care to patient under the physicians direct supervision. Patient agrees that he/she has been given the opportunity to refuse to give such consent and may withdraw consent at any time during appointment.

## 2018-02-19 NOTE — LETTER
2/19/2018 11:43 AM 
 
Patient:  Osvaldo Restrepo YOB: 1928 Date of Visit: 2/19/2018 Teodoro Lennox, MD 
[address] [facility] [address] [address] VIA In Basket Dear Teodoro Lennox, MD, Thank you for referring Ms. Madi Lau to [place] [address] for evaluation and treatment. Below are the relevant portions of my assessment and plan of care. Thank you very much for your referral of Ms. Madi Lau. If you have questions, please do not hesitate to call me. I look forward to following Ms. Arden Chacon along with you and will keep you updated as to her progress. Sincerely, Eve Clarke MD

## 2018-03-13 ENCOUNTER — OFFICE VISIT (OUTPATIENT)
Dept: INTERNAL MEDICINE CLINIC | Age: 83
End: 2018-03-13

## 2018-03-13 VITALS
BODY MASS INDEX: 19.23 KG/M2 | OXYGEN SATURATION: 97 % | WEIGHT: 91.6 LBS | SYSTOLIC BLOOD PRESSURE: 132 MMHG | HEIGHT: 58 IN | TEMPERATURE: 96.1 F | HEART RATE: 73 BPM | RESPIRATION RATE: 16 BRPM | DIASTOLIC BLOOD PRESSURE: 64 MMHG

## 2018-03-13 DIAGNOSIS — Z01.818 PREOPERATIVE GENERAL PHYSICAL EXAMINATION: Primary | ICD-10-CM

## 2018-03-13 NOTE — MR AVS SNAPSHOT
Sai Petit 
 
 
 [address] [facility] [address] [address] 
[phone] Patient: Hugo Joe MRN: [id number] CXY:1/79/1803 Visit Information Date & Time Provider Department Dept. Phone Encounter #  
 3/13/2018  2:45 PM Radha Mccann [facility] [phone] [id number] Follow-up Instructions Return in about 4 months (around 7/13/2018), or if symptoms worsen or fail to improve. Upcoming Health Maintenance Date Due DTaP/Tdap/Td series (1 - Tdap) 1/10/1949 ZOSTER VACCINE AGE 60> 11/10/1987 GLAUCOMA SCREENING Q2Y 1/10/1993 Bone Densitometry (Dexa) Screening 1/10/1993 MEDICARE YEARLY EXAM 11/8/2018 Allergies as of 3/13/2018  Review Complete On: 3/13/2018 By: Yg Adair LPN Severity Noted Reaction Type Reactions Alendronate  07/27/2008    Unknown (comments) Current Immunizations  Reviewed on 3/9/2018 Name Date Influenza High Dose Vaccine PF 11/1/2017 Influenza Vaccine 10/18/2012 12:00 AM  
 Pneumococcal Conjugate (PCV-13) 5/28/2015 Pneumococcal Polysaccharide (PPSV-23) 10/18/2012 12:00 AM, 10/29/2007 TB Skin Test (PPD) Intradermal 8/4/2017 Not reviewed this visit You Were Diagnosed With   
  
 Codes Comments Preoperative general physical examination    -  Primary ICD-10-CM: Z43.207 ICD-9-CM: V72.83 Vitals BP Pulse Temp Resp Height(growth percentile) Weight(growth percentile) 132/64 73 96.1 °F (35.6 °C) (Oral) 16 4' 10\" (1.473 m) 91 lb 9.6 oz (41.5 kg) SpO2 BMI OB Status Smoking Status 97% 19.14 kg/m2 Postmenopausal Former Smoker Vitals History BMI and BSA Data Body Mass Index Body Surface Area  
 19.14 kg/m 2 1.3 m 2 Your Updated Medication List  
  
   
This list is accurate as of 3/13/18  3:08 PM.  Always use your most recent med list.  
  
  
  
  
 aspirin delayed-release 81 mg tablet Take  by mouth daily. calcium carbonate-vitamin D3 600 mg(1,500mg) -800 unit Tab Take  by mouth. donepezil 5 mg tablet Commonly known as:  ARICEPT Take 1 Tab by mouth nightly. For one month. If tolerated, increase to 10 mg daily after one month  
  
 metoprolol succinate 50 mg XL tablet Commonly known as:  TOPROL-XL Take  by mouth daily. NITROSTAT 0.4 mg SL tablet Generic drug:  nitroglycerin  
by SubLINGual route every five (5) minutes as needed for Chest Pain. Omeprazole delayed release 20 mg tablet Commonly known as:  PRILOSEC D/R Take 1 Tab by mouth daily. Indications: Heartburn  
  
 polyethylene glycol 17 gram/dose powder Commonly known as:  Jarrett Roller Take 17 g by mouth daily as needed. Indications: constipation  
  
 ramipril 5 mg capsule Commonly known as:  ALTACE Take  by mouth daily. REFRESH LIQUIGEL 1 % Dlgl Generic drug:  carboxymethylcellulose sodium Apply  to eye. travoprost 0.004 % ophthalmic solution Commonly known as:  TRAVATAN Z Administer 1 Drop to both eyes every evening. VITAMIN D3 1,000 unit tablet Generic drug:  cholecalciferol Take  by mouth daily. Follow-up Instructions Return in about 4 months (around 7/13/2018), or if symptoms worsen or fail to improve. Introducing [facility] & HEALTH SERVICES! [employer] introduces [employer] patient portal. Now you can access parts of your medical record, email your doctor's office, and request medication refills online. 1. In your internet browser, go to [email or web address] 2. Click on the First Time User? Click Here link in the Sign In box. You will see the New Member Sign Up page. 3. Enter your UpCity Access Code exactly as it appears below. You will not need to use this code after youve completed the sign-up process. If you do not sign up before the expiration date, you must request a new code.  
 
· [employer] Access Code: [email or web address] 
 Expires: 5/15/2018  3:19 PM 
 
4. Enter the last four digits of your Social Security Number (xxxx) and Date of Birth (mm/dd/yyyy) as indicated and click Submit. You will be taken to the next sign-up page. 5. Create a Zappos ID. This will be your Zappos login ID and cannot be changed, so think of one that is secure and easy to remember. 6. Create a Zappos password. You can change your password at any time. 7. Enter your Password Reset Question and Answer. This can be used at a later time if you forget your password. 8. Enter your e-mail address. You will receive e-mail notification when new information is available in [address] 9. Click Sign Up. You can now view and download portions of your medical record. 10. Click the Download Summary menu link to download a portable copy of your medical information. If you have questions, please visit the Frequently Asked Questions section of the Zappos website. Remember, Z[employer]os is NOT to be used for urgent needs. For medical emergencies, dial 911. Now available from your iPhone and Android! Please provide this summary of care documentation to your next provider. Your primary care clinician is listed as Junaid Chambers. If you have any questions after today's visit, please call [phone].

## 2018-03-13 NOTE — PROGRESS NOTES
HISTORY OF PRESENT ILLNESS  Kaushal Began is a 80 y.o. female. HPI Comments: 81 yo female here for preop evaluation for hernia repair. Date not set. Recently seen by her cardiologist due to AS. Though increased risk, feels it is okay to proceed. No complaints at this time. Accompanied by her son and daughter-in-law. Review of Systems   Constitutional: Negative. Respiratory: Negative. Cardiovascular: Negative. Gastrointestinal: Negative. Past Medical History:   Diagnosis Date    Aortic heart valve narrowing     Chronic kidney disease     Femoral hernia     GERD (gastroesophageal reflux disease)     Glaucoma     Hip fracture, left ([address] 2013    Hypercholesterolemia     Hypertension     Lump in the abdomen     Macular degeneration     Myocardial infarct, old     Pulmonary HTN     Unstable chest pain due to insufficient blood supply to heart [facility])      Current Outpatient Prescriptions on File Prior to Visit   Medication Sig Dispense Refill    Omeprazole delayed release (PRILOSEC D/R) 20 mg tablet Take 1 Tab by mouth daily. Indications: Heartburn 90 Tab 3    donepezil (ARICEPT) 5 mg tablet Take 1 Tab by mouth nightly. For one month. If tolerated, increase to 10 mg daily after one month 30 Tab 2    cholecalciferol (VITAMIN D3) 1,000 unit tablet Take  by mouth daily.  metoprolol succinate (TOPROL-XL) 50 mg XL tablet Take  by mouth daily.  aspirin delayed-release 81 mg tablet Take  by mouth daily.  calcium carbonate-vitamin D3 600 mg(1,500mg) -800 unit tab Take  by mouth.  travoprost (TRAVATAN Z) 0.004 % ophthalmic solution Administer 1 Drop to both eyes every evening.  ramipril (ALTACE) 5 mg capsule Take  by mouth daily.  polyethylene glycol (MIRALAX) 17 gram/dose powder Take 17 g by mouth daily as needed. Indications: constipation 238 g 1    carboxymethylcellulose sodium (REFRESH LIQUIGEL) 1 % dlgl Apply  to eye.       nitroglycerin (NITROSTAT) 0.4 mg SL tablet by SubLINGual route every five (5) minutes as needed for Chest Pain. No current facility-administered medications on file prior to visit. Physical Exam   Constitutional: She appears well-developed and well-nourished. No distress. /64  Pulse 73  Temp 96.1 °F (35.6 °C) (Oral)   Resp 16  Ht 4' 10\" (1.473 m)  Wt 91 lb 9.6 oz (41.5 kg)  SpO2 97%  BMI 19.14 kg/m2     Eyes: EOM are normal. Right eye exhibits no discharge. Left eye exhibits no discharge. No scleral icterus. Neck: Neck supple. Cardiovascular: Normal rate, regular rhythm and normal heart sounds. Exam reveals no gallop and no friction rub. No murmur heard. Pulmonary/Chest: Effort normal and breath sounds normal. No respiratory distress. She has no wheezes. She has no rales. Abdominal: Soft. She exhibits no distension. There is no tenderness. There is no rebound and no guarding. Musculoskeletal: She exhibits no edema or tenderness. Lymphadenopathy:     She has no cervical adenopathy. Neurological: She is alert. She exhibits normal muscle tone. Skin: Skin is warm and dry. Psychiatric: She has a normal mood and affect. Lab Results   Component Value Date/Time    Sodium 138 02/14/2018 02:17 PM    Potassium 4.9 02/14/2018 02:17 PM    Chloride 100 02/14/2018 02:17 PM    CO2 30 02/14/2018 02:17 PM    Anion gap 8 02/14/2018 02:17 PM    Glucose 83 02/14/2018 02:17 PM    BUN 15 02/14/2018 02:17 PM    Creatinine 1.31 (H) 02/14/2018 02:17 PM    BUN/Creatinine ratio 11 (L) 02/14/2018 02:17 PM    GFR est AA 46 (L) 02/14/2018 02:17 PM    GFR est non-AA 38 (L) 02/14/2018 02:17 PM    Calcium 9.1 02/14/2018 02:17 PM    Bilirubin, total 0.4 02/14/2018 02:17 PM    AST (SGOT) 13 (L) 02/14/2018 02:17 PM    Alk.  phosphatase 103 02/14/2018 02:17 PM    Protein, total 6.7 02/14/2018 02:17 PM    Albumin 3.5 02/14/2018 02:17 PM    Globulin 3.2 02/14/2018 02:17 PM    A-G Ratio 1.1 02/14/2018 02:17 PM    ALT (SGPT) 14 2018 02:17 PM     Lab Results   Component Value Date/Time    WBC 7.0 2018 02:17 PM    HGB 11.6 (L) 2018 02:17 PM    HCT 36.7 2018 02:17 PM    PLATELET 896  02:17 PM    MCV 93.9 2018 02:17 PM     ASSESSMENT and PLAN    ICD-10-CM ICD-9-CM    1. Preoperative general physical examination Z01.818 V72.83          Preoperative Evaluation    Date of Exam: 3/13/2018    Diane Perry is a 80 y.o. female (:1/10/1928) who presents for preoperative evaluation. RCRI=1    Latex Allergy: no    Problem List:     Patient Active Problem List    Diagnosis Date Noted    Late onset Alzheimer's disease without behavioral disturbance 2017    Essential hypertension 2017    Aortic stenosis, severe 2017    Gastroesophageal reflux disease without esophagitis 2017     Medical History:     Past Medical History:   Diagnosis Date    Aortic heart valve narrowing     Chronic kidney disease     Femoral hernia     GERD (gastroesophageal reflux disease)     Glaucoma     Hip fracture, left ([address] 2013    Hypercholesterolemia     Hypertension     Lump in the abdomen     Macular degeneration     Myocardial infarct, old     Pulmonary HTN     Unstable chest pain due to insufficient blood supply to heart (HCC)      Allergies: Allergies   Allergen Reactions    Alendronate Unknown (comments)      Medications:     Current Outpatient Prescriptions   Medication Sig    Omeprazole delayed release (PRILOSEC D/R) 20 mg tablet Take 1 Tab by mouth daily. Indications: Heartburn    donepezil (ARICEPT) 5 mg tablet Take 1 Tab by mouth nightly. For one month. If tolerated, increase to 10 mg daily after one month    cholecalciferol (VITAMIN D3) 1,000 unit tablet Take  by mouth daily.  metoprolol succinate (TOPROL-XL) 50 mg XL tablet Take  by mouth daily.  aspirin delayed-release 81 mg tablet Take  by mouth daily.     calcium carbonate-vitamin D3 600 mg(1,500mg) -800 unit tab Take  by mouth.  travoprost (TRAVATAN Z) 0.004 % ophthalmic solution Administer 1 Drop to both eyes every evening.  ramipril (ALTACE) 5 mg capsule Take  by mouth daily.  polyethylene glycol (MIRALAX) 17 gram/dose powder Take 17 g by mouth daily as needed. Indications: constipation    carboxymethylcellulose sodium (REFRESH LIQUIGEL) 1 % dlgl Apply  to eye.  nitroglycerin (NITROSTAT) 0.4 mg SL tablet by SubLINGual route every five (5) minutes as needed for Chest Pain. No current facility-administered medications for this visit. Surgical History:     Past Surgical History:   Procedure Laterality Date    CARDIAC SURG PROCEDURE UNLIST  1993    double bypass     Social History:     Social History     Social History    Marital status:      Spouse name: N/A    Number of children: N/A    Years of education: N/A     Social History Main Topics    Smoking status: Former Smoker    Smokeless tobacco: Never Used      Comment: approx 30 yrs ago    Alcohol use No      Comment: occasionally    Drug use: No    Sexual activity: No     Other Topics Concern    None     Social History Narrative       Anesthesia Complications: None  History of abnormal bleeding : None  History of Blood Transfusions: no  Health Care Directive or Living Will: no    Objective:     ROS:   Feeling well. No dyspnea or chest pain on exertion. No abdominal pain, change in bowel habits, black or bloody stools. No urinary tract symptoms. No neurological complaints. OBJECTIVE:   The patient appears well, alert, oriented x 3, in no distress. Visit Vitals    /64    Pulse 73    Temp 96.1 °F (35.6 °C) (Oral)    Resp 16    Ht 4' 10\" (1.473 m)    Wt 91 lb 9.6 oz (41.5 kg)    SpO2 97%    BMI 19.14 kg/m2     HEENT:ENT normal.  Neck supple. No adenopathy or thyromegaly. SHUBHAM. Chest: Lungs are clear, good air entry, no wheezes, rhonchi or rales.    Cardiovascular: S1 and S2 normal, no murmurs, regular rate and rhythm. Abdomen: soft without tenderness, guarding   Extremities: show no edema   Neurological: is normal, no focal findings. DIAGNOSTICS:   1. Labs:   Lab Results  Component Value Date/Time   WBC 7.0 02/14/2018 02:17 PM   HGB 11.6 (L) 02/14/2018 02:17 PM   HCT 36.7 02/14/2018 02:17 PM   PLATELET 750 60/10/1195 02:17 PM   MCV 93.9 02/14/2018 02:17 PM     Lab Results  Component Value Date/Time   Glucose 83 02/14/2018 02:17 PM   Creatinine 1.31 (H) 02/14/2018 02:17 PM      No results found for: CHOL, CHOLPOCT, HDL, LDL, LDLC, LDLCPOC, LDLCEXT, TRIGL, TGLPOCT, CHHD, CHHDX  Lab Results  Component Value Date/Time   GFR est non-AA 38 (L) 02/14/2018 02:17 PM   GFR est AA 46 (L) 02/14/2018 02:17 PM   Creatinine 1.31 (H) 02/14/2018 02:17 PM   BUN 15 02/14/2018 02:17 PM   Sodium 138 02/14/2018 02:17 PM   Potassium 4.9 02/14/2018 02:17 PM   Chloride 100 02/14/2018 02:17 PM   CO2 30 02/14/2018 02:17 PM       IMPRESSION:   Increased risk due to AS, but has been seen by her cardiologist who feels it is okay to proceed with hernia repair.    No contraindications to planned surgery    Talib Ohraa MD   3/13/2018

## 2018-03-13 NOTE — PROGRESS NOTES
Identified pt with two pt identifiers(name and ). Reviewed record in preparation for visit and have obtained necessary documentation. Chief Complaint   Patient presents with    Hernia (Non Specific)     (Rm #18) f/u; pt received clearance for hernia surgery from cardiologist on 18      Coordination of Care Questionnaire:  :   1) Have you been to an emergency room, urgent care clinic since your last visit? no   Hospitalized since your last visit? no             2. Have seen or consulted any other health care provider since your last visit? YES  If yes, where when, and reason for visit? /inez- Dr. David Castañeda  18: Dr. Sanchez (cardiac)    3) Do you have an Advanced Directive/ Living Will in place? YES  If yes, do we have a copy on file YES  If no, would you like information NO    Patient is accompanied by son I have received verbal consent from Gabe Lilly to discuss any/all medical information while they are present in the room.

## 2018-03-19 ENCOUNTER — OFFICE VISIT (OUTPATIENT)
Dept: SURGERY | Age: 83
End: 2018-03-19

## 2018-03-19 VITALS
HEIGHT: 58 IN | WEIGHT: 90 LBS | RESPIRATION RATE: 20 BRPM | SYSTOLIC BLOOD PRESSURE: 128 MMHG | TEMPERATURE: 95.6 F | BODY MASS INDEX: 18.89 KG/M2 | HEART RATE: 71 BPM | DIASTOLIC BLOOD PRESSURE: 50 MMHG

## 2018-03-19 DIAGNOSIS — K43.9 ABDOMINAL WALL HERNIA: ICD-10-CM

## 2018-03-19 DIAGNOSIS — K40.90 LEFT GROIN HERNIA: Primary | ICD-10-CM

## 2018-03-19 NOTE — MR AVS SNAPSHOT
[facility] 
 
 
 [address] [facility] [address] [address] 
[phone] Patient: Gabe Lilly MRN: [id number] MCN:[id number] Visit Information Date & Time Provider Department Dept. Phone Encounter #  
 3/19/2018 10:30 AM Bettie Alexandra MD [facility] [phone] [id number] Your Appointments 4/23/2018  9:30 AM  
POST OP with Bettie Alexandra MD  
[address] [place] [address]) Appt Note: 2 week post op [id number] [facility] [address] [facility] [address] [address]  
  
   
 [address] [facility] [address] [address] Upcoming Health Maintenance Date Due DTaP/Tdap/Td series (1 - Tdap) 1/10/1949 ZOSTER VACCINE AGE 60> 11/10/1987 GLAUCOMA SCREENING Q2Y 1/10/1993 Bone Densitometry (Dexa) Screening 1/10/1993 MEDICARE YEARLY EXAM 11/8/2018 Allergies as of 3/19/2018  Review Complete On: 3/13/2018 By: Micheal Davenport LPN Severity Noted Reaction Type Reactions Alendronate  07/27/2008    Unknown (comments) Current Immunizations  Reviewed on 3/9/2018 Name Date Influenza High Dose Vaccine PF 11/1/2017 Influenza Vaccine 10/18/2012 12:00 AM  
 Pneumococcal Conjugate (PCV-13) 5/28/2015 Pneumococcal Polysaccharide (PPSV-23) 10/18/2012 12:00 AM, 10/29/2007 TB Skin Test (PPD) Intradermal 8/4/2017 Not reviewed this visit You Were Diagnosed With   
  
 Codes Comments Left groin hernia    -  Primary ICD-10-CM: K40.90 ICD-9-CM: 550.90 Abdominal wall hernia     ICD-10-CM: K43.9 ICD-9-CM: 553.20 Vitals BP Pulse Temp Resp Height(growth percentile) Weight(growth percentile) 128/50 (BP 1 Location: Right arm, BP Patient Position: At rest) 71 95.6 °F (35.3 °C) (Oral) 20 4' 10\" (1.473 m) 90 lb (40.8 kg) BMI OB Status Smoking Status 18.81 kg/m2 Postmenopausal Former Smoker Vitals History BMI and BSA Data Body Mass Index Body Surface Area  
 18.81 kg/m 2 1.29 m 2 Your Updated Medication List  
  
   
This list is accurate as of 3/19/18 11:02 AM.  Always use your most recent med list.  
  
  
  
  
 aspirin delayed-release 81 mg tablet Take  by mouth daily. calcium carbonate-vitamin D3 600 mg(1,500mg) -800 unit Tab Take  by mouth. donepezil 5 mg tablet Commonly known as:  ARICEPT Take 1 Tab by mouth nightly. For one month. If tolerated, increase to 10 mg daily after one month  
  
 metoprolol succinate 50 mg XL tablet Commonly known as:  TOPROL-XL Take  by mouth daily. NITROSTAT 0.4 mg SL tablet Generic drug:  nitroglycerin  
by SubLINGual route every five (5) minutes as needed for Chest Pain. Omeprazole delayed release 20 mg tablet Commonly known as:  PRILOSEC D/R Take 1 Tab by mouth daily. Indications: Heartburn  
  
 polyethylene glycol 17 gram/dose powder Commonly known as:  Jarrett Roller Take 17 g by mouth daily as needed. Indications: constipation  
  
 ramipril 5 mg capsule Commonly known as:  ALTACE Take  by mouth daily. REFRESH LIQUIGEL 1 % Dlgl Generic drug:  carboxymethylcellulose sodium Apply  to eye. travoprost 0.004 % ophthalmic solution Commonly known as:  TRAVATAN Z Administer 1 Drop to both eyes every evening. VITAMIN D3 1,000 unit tablet Generic drug:  cholecalciferol Take  by mouth daily. Patient Instructions If you have any questions or concerns about today's appointment, the verbal and/or written instructions you were given for follow up care, please call our office at [phone]. [facility] [address] [place] [address], [address] 
 
[phone] office [phone] fax Corrinne Dresser PATIENT PRE AND POST OPERATIVE INSTRUCTIONS [facility] [address] [facility] 
[phone] Before Surgery Instructions:  
1) You must have someone available to drive you to and from your procedure and stay with you for the first 24 hours. 2) It is very important that you have nothing to eat or drink after midnight the night before your surgery. This includes chewing gum or sucking on hard candy. Take only heart, blood pressure and cholesterol medications the morning of surgery with only a sip of water. 3) Please stop taking Plavix 10 days prior to your surgery. Stop taking Coumadin 5 days prior to your surgery. Stop taking all Aspirin or Aspirin containing products 7 days prior to your surgery. Stop taking Advil, Motrin, Aleve, and etc. 3 days prior to your surgery. 4) If you take any diabetic medications please consult with your primary care physician on how to take them on the day of your surgery 5) Please stop all Herbal products 2 weeks prior to your surgery. 6) Please arrive at the hospital 2 hours prior to your surgery, unless you have been otherwise instructed. 7) Patients having an operation on their colon will be given a separate instruction sheet on their Bowel Prep. 8) For any pre-operative work up check in at the main entrance to [facility], and then go to Patient Registration. These studies are done on a walk in basis they are open from 7:00am to 5:00pm Monday through Friday. 9) Please wash your surgical site the morning of your surgery with soap and water. 10) If you are of child bearing age you will have pregnancy test done the morning of your surgery as soon as you arrive. 11) You may be contacted to change your surgery time. At times this is necessary due to equipment or staffing needs. After Surgery Instructions: You will need to be seen in the office for a follow-up visit 7-14 days after your surgery. Please call after you have had the procedure to make this appointment. Unless otherwise instructed, you may remove your outer bandage and shower 48 hours after your surgery. If you develop a fever greater than 101, have any significant drainage, bleeding, swelling and/or pus of the wound. Please call our office immediately. Surgery Date and Time:  Thursday, April 12, 2018 at 9:30am  
 
Please check in at [facility], enter through the Emergency Room entrance and go up to the second floor. Please check in by 7:30am the day of your surgery. You may contact Joe Lucero with any questions at [phone]. Introducing [facility]! Hari Dickson introduces [employer] patient portal. Now you can access parts of your medical record, email your doctor's office, and request medication refills online. 1. In your internet browser, go to [email or web address] 2. Click on the First Time User? Click Here link in the Sign In box. You will see the New Member Sign Up page. 3. Enter your [employer] Access Code exactly as it appears below. You will not need to use this code after youve completed the sign-up process. If you do not sign up before the expiration date, you must request a new code. · [employer] Access Code: [email or web address] Expires: 5/15/2018  3:19 PM 
 
4. Enter the last four digits of your Social Security Number (xxxx) and Date of Birth (mm/dd/yyyy) as indicated and click Submit. You will be taken to the next sign-up page. 5. Create a [employer] ID. This will be your [employer] login ID and cannot be changed, so think of one that is secure and easy to remember. 6. Create a [employer] password. You can change your password at any time. 7. Enter your Password Reset Question and Answer. This can be used at a later time if you forget your password. 8. Enter your e-mail address. You will receive e-mail notification when new information is available in [address] 9. Click Sign Up. You can now view and download portions of your medical record. 10. Click the Download Summary menu link to download a portable copy of your medical information. If you have questions, please visit the Frequently Asked Questions section of the [employer] [address] website. Remember, [employer] [address] [address] is NOT to be used for urgent needs. For medical emergencies, dial 911. Now available from your iPhone and Android! Please provide this summary of care documentation to your next provider. Your primary care clinician is listed as Junaid Chambers. If you have any questions after today's visit, please call [phone].

## 2018-03-19 NOTE — PATIENT INSTRUCTIONS
If you have any questions or concerns about today's appointment, the verbal and/or written instructions you were given for follow up care, please call our office at [phone]. [facility]  [address] [facility] [address] [place] [address], [address]    [phone] office  [phone] fax    . PATIENT PRE AND POST OPERATIVE INSTRUCTIONS     [place] [address]  [phone]    Before Surgery Instructions:   1) You must have someone available to drive you to and from your procedure and stay with you for the first 24 hours. 2) It is very important that you have nothing to eat or drink after midnight the night before your surgery. This includes chewing gum or sucking on hard candy. Take only heart, blood pressure and cholesterol medications the morning of surgery with only a sip of water. 3) Please stop taking Plavix 10 days prior to your surgery. Stop taking Coumadin 5 days prior to your surgery. Stop taking all Aspirin or Aspirin containing products 7 days prior to your surgery. Stop taking Advil, Motrin, Aleve, and etc. 3 days prior to your surgery. 4) If you take any diabetic medications please consult with your primary care physician on how to take them on the day of your surgery  5) Please stop all Herbal products 2 weeks prior to your surgery. 6) Please arrive at the hospital 2 hours prior to your surgery, unless you have been otherwise instructed. 7) Patients having an operation on their colon will be given a separate instruction sheet on their Bowel Prep. 8) For any pre-operative work up check in at the main entrance to [facility], and then go to Patient Registration. These studies are done on a walk in basis they are open from 7:00am to 5:00pm Monday through Friday. 9) Please wash your surgical site the morning of your surgery with soap and water.   10) If you are of child bearing age you will have pregnancy test done the morning of your surgery as soon as you arrive. 11) You may be contacted to change your surgery time. At times this is necessary due to equipment or staffing needs. After Surgery Instructions: You will need to be seen in the office for a follow-up visit 7-14 days after your surgery. Please call after you have had the procedure to make this appointment. Unless otherwise instructed, you may remove your outer bandage and shower 48 hours after your surgery. If you develop a fever greater than 101, have any significant drainage, bleeding, swelling and/or pus of the wound. Please call our office immediately. Surgery Date and Time:  Thursday, April 12, 2018 at 9:30am     Please check in at [facility], enter through the Emergency Room entrance and go up to the second floor. Please check in by 7:30am the day of your surgery. You may contact Timur Barboza with any questions at [phone].

## 2018-03-19 NOTE — PROGRESS NOTES
Neris Barakat is a 80 y.o. female who presents today with   Chief Complaint   Patient presents with    Hernia (Non Specific)     Pt presents today to dicss surgical options. 1. Have you been to the ER, urgent care clinic since your last visit? Hospitalized since your last visit? No    2. Have you seen or consulted any other health care providers outside of the [address] [place] [address] since your last visit? Include any pap smears or colon screening.  No

## 2018-03-19 NOTE — PROGRESS NOTES
General Surgery Consult      Caryn Blackman  Admit date: (Not on file)    MRN: [id number]     : 1/10/1928     Age: 80 y.o. Attending Physician: Johnathan Suarez MD, [facility]      History of Present Illness:     Caryn Blackman is a 80 y.o. female Who is known to me. I saw the patient about 3 weeks ago for a left abdominal wall/groin hernia. The patient has a history of aortic stenosis, so  cardiology clearance was performed. Amazingly the patient has her hernia increased in size by 4-5 times in the last couple weeks. She still complaining of pain and constipation. She is here today to discuss the surgery with her son.     Patient Active Problem List    Diagnosis Date Noted    Late onset Alzheimer's disease without behavioral disturbance 2017    Essential hypertension 2017    Aortic stenosis, severe 2017    Gastroesophageal reflux disease without esophagitis 2017     Past Medical History:   Diagnosis Date    Aortic heart valve narrowing     Chronic kidney disease     Femoral hernia     GERD (gastroesophageal reflux disease)     Glaucoma     Hip fracture, left ([address]     Hypercholesterolemia     Hypertension     Lump in the abdomen     Macular degeneration     Myocardial infarct, [facility] HTN     Unstable chest pain due to insufficient blood supply to heart [facility])       Past Surgical History:   Procedure Laterality Date    CARDIAC SURG PROCEDURE UNLIST      double bypass      Social History   Substance Use Topics    Smoking status: Former Smoker    Smokeless tobacco: Never Used      Comment: approx 30 yrs ago    Alcohol use No      Comment: occasionally      History   Smoking Status    Former Smoker   Smokeless Tobacco    Never Used     Comment: approx 27 yrs ago     Family History   Problem Relation Age of Onset    Heart Attack Mother     Heart Attack Father       Current Outpatient Prescriptions   Medication Sig    Omeprazole delayed release (PRILOSEC D/R) 20 mg tablet Take 1 Tab by mouth daily. Indications: Heartburn    polyethylene glycol (MIRALAX) 17 gram/dose powder Take 17 g by mouth daily as needed. Indications: constipation    donepezil (ARICEPT) 5 mg tablet Take 1 Tab by mouth nightly. For one month. If tolerated, increase to 10 mg daily after one month    cholecalciferol (VITAMIN D3) 1,000 unit tablet Take  by mouth daily.  metoprolol succinate (TOPROL-XL) 50 mg XL tablet Take  by mouth daily.  aspirin delayed-release 81 mg tablet Take  by mouth daily.  calcium carbonate-vitamin D3 600 mg(1,500mg) -800 unit tab Take  by mouth.  travoprost (TRAVATAN Z) 0.004 % ophthalmic solution Administer 1 Drop to both eyes every evening.  ramipril (ALTACE) 5 mg capsule Take  by mouth daily.  carboxymethylcellulose sodium (REFRESH LIQUIGEL) 1 % dlgl Apply  to eye.  nitroglycerin (NITROSTAT) 0.4 mg SL tablet by SubLINGual route every five (5) minutes as needed for Chest Pain. No current facility-administered medications for this visit. Allergies   Allergen Reactions    Alendronate Unknown (comments)        Review of Systems:  Pertinent items are noted in the History of Present Illness. Objective:     Visit Vitals    /50 (BP 1 Location: Right arm, BP Patient Position: At rest)    Pulse 71    Temp 95.6 °F (35.3 °C) (Oral)    Resp 20    Ht 4' 10\" (1.473 m)    Wt 40.8 kg (90 lb)    BMI 18.81 kg/m2       Physical Exam:      General:  in no apparent distress, alert and oriented times 3   Eyes:  conjunctivae and sclerae normal, pupils equal, round, reactive to light   Throat & Neck: no erythema or exudates noted and neck supple and symmetrical; no palpable masses   Lungs:   clear to auscultation bilaterally   Heart:  Regular rate and rhythm   Abdomen:   flat, soft, nontender, nondistended, no masses or organomegaly. There is a huge left lower quadrant bulge that has quadruple in size since 3 weeks ago.  It is not reducible and mildly tender. Extremities: extremities normal, atraumatic, no cyanosis or edema   Skin: Normal.       Imaging and Lab Review:     CBC:   Lab Results   Component Value Date/Time    WBC 7.0 02/14/2018 02:17 PM    RBC 3.91 (L) 02/14/2018 02:17 PM    HGB 11.6 (L) 02/14/2018 02:17 PM    HCT 36.7 02/14/2018 02:17 PM    PLATELET 834 02/40/4210 02:17 PM     BMP:   Lab Results   Component Value Date/Time    Glucose 83 02/14/2018 02:17 PM    Sodium 138 02/14/2018 02:17 PM    Potassium 4.9 02/14/2018 02:17 PM    Chloride 100 02/14/2018 02:17 PM    CO2 30 02/14/2018 02:17 PM    BUN 15 02/14/2018 02:17 PM    Creatinine 1.31 (H) 02/14/2018 02:17 PM    Calcium 9.1 02/14/2018 02:17 PM     CMP:  Lab Results   Component Value Date/Time    Glucose 83 02/14/2018 02:17 PM    Sodium 138 02/14/2018 02:17 PM    Potassium 4.9 02/14/2018 02:17 PM    Chloride 100 02/14/2018 02:17 PM    CO2 30 02/14/2018 02:17 PM    BUN 15 02/14/2018 02:17 PM    Creatinine 1.31 (H) 02/14/2018 02:17 PM    Calcium 9.1 02/14/2018 02:17 PM    Anion gap 8 02/14/2018 02:17 PM    BUN/Creatinine ratio 11 (L) 02/14/2018 02:17 PM    Alk. phosphatase 103 02/14/2018 02:17 PM    Protein, total 6.7 02/14/2018 02:17 PM    Albumin 3.5 02/14/2018 02:17 PM    Globulin 3.2 02/14/2018 02:17 PM    A-G Ratio 1.1 02/14/2018 02:17 PM       No results found for this or any previous visit (from the past 24 hour(s)). images and reports reviewed    Assessment:   Lulu Rivera is a 80 y.o. female is presenting with a picture of enlarging symptomatic left abdominal wall, possible left groin hernias. Giving the fact that the patient's hernias has increased in size remarkably and it's symptomatic and we have cardiologically clearance despite her a aortic stenosis, unfortunately I think the only option is to do the surgical intervention.  I Discussed the possibility of strangulation, enlargement in size over time, and the risk of emergency surgery in the face of strangulation. I also discussed the use of prosthetic materials (mesh), including the risk of infection. Also discussed the risk of surgery including recurrence and the possible need for reoperation and removal of mesh if used, possibility of postoperative small bowel injury, obstruction or ileus, and the risks of general anesthetic. I explained to the the patient about the robotic hernia repair procedure. I also explained to the patient that his off on putting to open giving how large the hernia is, as well as that is anesthesia especially given her a work week stenosis with a high chance of complications including death. The son stated that they will not be able to do the surgery before 3 weeks from now despite the risk of strangulation. Plan:     1. Schedule for robotic, possible open, left abdomen wall and left groin hernias repair with placement of mesh. 2. No heavy lifting for 2 weeks after the surgery (More than 15 pounds)  3. Avoid constipation by taking stool softener.      Please call me if you have any questions (cell phone: [phone])     Signed By: Zia Pineda MD     March 19, 2018

## 2018-04-16 ENCOUNTER — ANESTHESIA EVENT (OUTPATIENT)
Dept: SURGERY | Age: 83
End: 2018-04-16
Payer: MEDICARE

## 2018-04-17 ENCOUNTER — ANESTHESIA (OUTPATIENT)
Dept: SURGERY | Age: 83
End: 2018-04-17
Payer: MEDICARE

## 2018-04-17 ENCOUNTER — HOSPITAL ENCOUNTER (OUTPATIENT)
Age: 83
Setting detail: OBSERVATION
Discharge: HOME | End: 2018-04-18
Attending: SURGERY | Admitting: SURGERY
Payer: MEDICARE

## 2018-04-17 PROBLEM — K40.90 LEFT INGUINAL HERNIA: Status: ACTIVE | Noted: 2018-04-17

## 2018-04-17 LAB
ATRIAL RATE: 59 BPM
BUN BLD-MCNC: 24 MG/DL (ref 7–18)
CALCULATED R AXIS, ECG10: 108 DEGREES
CALCULATED T AXIS, ECG11: 117 DEGREES
CHLORIDE BLD-SCNC: 97 MMOL/L (ref 100–108)
DIAGNOSIS, 93000: NORMAL
GLUCOSE BLD STRIP.AUTO-MCNC: 106 MG/DL (ref 74–106)
HCT VFR BLD CALC: 33 % (ref 36–49)
HGB BLD-MCNC: 11.2 G/DL (ref 12–16)
P-R INTERVAL, ECG05: 128 MS
POTASSIUM BLD-SCNC: 4.6 MMOL/L (ref 3.5–5.5)
Q-T INTERVAL, ECG07: 380 MS
QRS DURATION, ECG06: 84 MS
QTC CALCULATION (BEZET), ECG08: 376 MS
SODIUM BLD-SCNC: 135 MMOL/L (ref 136–145)
VENTRICULAR RATE, ECG03: 59 BPM

## 2018-04-17 PROCEDURE — 74011250637 HC RX REV CODE- 250/637

## 2018-04-17 PROCEDURE — 76010000934 HC OR TIME 1 TO 1.5HR INTENSV - TIER 2: Performed by: SURGERY

## 2018-04-17 PROCEDURE — 74011000250 HC RX REV CODE- 250

## 2018-04-17 PROCEDURE — 76060000033 HC ANESTHESIA 1 TO 1.5 HR: Performed by: SURGERY

## 2018-04-17 PROCEDURE — 77030027138 HC INCENT SPIROMETER -A

## 2018-04-17 PROCEDURE — 74011250636 HC RX REV CODE- 250/636: Performed by: NURSE ANESTHETIST, CERTIFIED REGISTERED

## 2018-04-17 PROCEDURE — 77030018842 HC SOL IRR SOD CL 9% BAXT -A: Performed by: SURGERY

## 2018-04-17 PROCEDURE — 77030035277 HC OBTRTR BLDELSS DISP INTU -B: Performed by: SURGERY

## 2018-04-17 PROCEDURE — 74011000272 HC RX REV CODE- 272: Performed by: SURGERY

## 2018-04-17 PROCEDURE — 74011250636 HC RX REV CODE- 250/636

## 2018-04-17 PROCEDURE — 74011250636 HC RX REV CODE- 250/636: Performed by: SURGERY

## 2018-04-17 PROCEDURE — C1781 MESH (IMPLANTABLE): HCPCS | Performed by: SURGERY

## 2018-04-17 PROCEDURE — 77030020703 HC SEAL CANN DISP INTU -B: Performed by: SURGERY

## 2018-04-17 PROCEDURE — 84295 ASSAY OF SERUM SODIUM: CPT

## 2018-04-17 PROCEDURE — 77030011640 HC PAD GRND REM COVD -A: Performed by: SURGERY

## 2018-04-17 PROCEDURE — 76210000006 HC OR PH I REC 0.5 TO 1 HR: Performed by: SURGERY

## 2018-04-17 PROCEDURE — 74011000250 HC RX REV CODE- 250: Performed by: SURGERY

## 2018-04-17 PROCEDURE — 77030002933 HC SUT MCRYL J&J -A: Performed by: SURGERY

## 2018-04-17 PROCEDURE — 77030032490 HC SLV COMPR SCD KNE COVD -B: Performed by: SURGERY

## 2018-04-17 PROCEDURE — 93005 ELECTROCARDIOGRAM TRACING: CPT

## 2018-04-17 PROCEDURE — 77030010507 HC ADH SKN DERMBND J&J -B: Performed by: SURGERY

## 2018-04-17 PROCEDURE — 99218 HC RM OBSERVATION: CPT

## 2018-04-17 PROCEDURE — 77030031139 HC SUT VCRL2 J&J -A: Performed by: SURGERY

## 2018-04-17 DEVICE — MESH HERN W10XL15CM POLY POLYLACTIC ACID 70% CLLGN 30% GLYC: Type: IMPLANTABLE DEVICE | Site: ABDOMEN | Status: FUNCTIONAL

## 2018-04-17 RX ORDER — POLYETHYLENE GLYCOL 3350 17 G/17G
17 POWDER, FOR SOLUTION ORAL DAILY
Status: DISCONTINUED | OUTPATIENT
Start: 2018-04-18 | End: 2018-04-18 | Stop reason: HOSPADM

## 2018-04-17 RX ORDER — INSULIN LISPRO 100 [IU]/ML
INJECTION, SOLUTION INTRAVENOUS; SUBCUTANEOUS ONCE
Status: DISCONTINUED | OUTPATIENT
Start: 2018-04-18 | End: 2018-04-17 | Stop reason: HOSPADM

## 2018-04-17 RX ORDER — ONDANSETRON 2 MG/ML
4 INJECTION INTRAMUSCULAR; INTRAVENOUS ONCE
Status: DISCONTINUED | OUTPATIENT
Start: 2018-04-17 | End: 2018-04-17 | Stop reason: HOSPADM

## 2018-04-17 RX ORDER — SODIUM CHLORIDE 0.9 % (FLUSH) 0.9 %
5-10 SYRINGE (ML) INJECTION EVERY 8 HOURS
Status: DISCONTINUED | OUTPATIENT
Start: 2018-04-17 | End: 2018-04-17 | Stop reason: HOSPADM

## 2018-04-17 RX ORDER — VECURONIUM BROMIDE FOR INJECTION 1 MG/ML
INJECTION, POWDER, LYOPHILIZED, FOR SOLUTION INTRAVENOUS AS NEEDED
Status: DISCONTINUED | OUTPATIENT
Start: 2018-04-17 | End: 2018-04-17 | Stop reason: HOSPADM

## 2018-04-17 RX ORDER — SODIUM CHLORIDE 0.9 % (FLUSH) 0.9 %
5-10 SYRINGE (ML) INJECTION AS NEEDED
Status: DISCONTINUED | OUTPATIENT
Start: 2018-04-17 | End: 2018-04-17 | Stop reason: HOSPADM

## 2018-04-17 RX ORDER — FAMOTIDINE 20 MG/1
20 TABLET, FILM COATED ORAL ONCE
Status: COMPLETED | OUTPATIENT
Start: 2018-04-18 | End: 2018-04-17

## 2018-04-17 RX ORDER — MORPHINE SULFATE 4 MG/ML
2 INJECTION INTRAVENOUS
Status: DISCONTINUED | OUTPATIENT
Start: 2018-04-17 | End: 2018-04-18 | Stop reason: HOSPADM

## 2018-04-17 RX ORDER — DOCUSATE SODIUM 100 MG/1
100 CAPSULE, LIQUID FILLED ORAL DAILY
Status: DISCONTINUED | OUTPATIENT
Start: 2018-04-18 | End: 2018-04-18 | Stop reason: HOSPADM

## 2018-04-17 RX ORDER — SODIUM CHLORIDE, SODIUM LACTATE, POTASSIUM CHLORIDE, CALCIUM CHLORIDE 600; 310; 30; 20 MG/100ML; MG/100ML; MG/100ML; MG/100ML
50 INJECTION, SOLUTION INTRAVENOUS CONTINUOUS
Status: DISCONTINUED | OUTPATIENT
Start: 2018-04-18 | End: 2018-04-17 | Stop reason: HOSPADM

## 2018-04-17 RX ORDER — FENTANYL CITRATE 50 UG/ML
50 INJECTION, SOLUTION INTRAMUSCULAR; INTRAVENOUS
Status: DISCONTINUED | OUTPATIENT
Start: 2018-04-17 | End: 2018-04-17 | Stop reason: HOSPADM

## 2018-04-17 RX ORDER — GLYCOPYRROLATE 0.2 MG/ML
INJECTION INTRAMUSCULAR; INTRAVENOUS AS NEEDED
Status: DISCONTINUED | OUTPATIENT
Start: 2018-04-17 | End: 2018-04-17 | Stop reason: HOSPADM

## 2018-04-17 RX ORDER — LABETALOL HCL 20 MG/4 ML
SYRINGE (ML) INTRAVENOUS AS NEEDED
Status: DISCONTINUED | OUTPATIENT
Start: 2018-04-17 | End: 2018-04-17 | Stop reason: HOSPADM

## 2018-04-17 RX ORDER — OXYCODONE AND ACETAMINOPHEN 5; 325 MG/1; MG/1
1 TABLET ORAL
Status: DISCONTINUED | OUTPATIENT
Start: 2018-04-17 | End: 2018-04-18 | Stop reason: HOSPADM

## 2018-04-17 RX ORDER — FENTANYL CITRATE 50 UG/ML
INJECTION, SOLUTION INTRAMUSCULAR; INTRAVENOUS AS NEEDED
Status: DISCONTINUED | OUTPATIENT
Start: 2018-04-17 | End: 2018-04-17 | Stop reason: HOSPADM

## 2018-04-17 RX ORDER — LIDOCAINE HYDROCHLORIDE 20 MG/ML
INJECTION, SOLUTION EPIDURAL; INFILTRATION; INTRACAUDAL; PERINEURAL AS NEEDED
Status: DISCONTINUED | OUTPATIENT
Start: 2018-04-17 | End: 2018-04-17 | Stop reason: HOSPADM

## 2018-04-17 RX ORDER — ONDANSETRON 2 MG/ML
6 INJECTION INTRAMUSCULAR; INTRAVENOUS
Status: DISCONTINUED | OUTPATIENT
Start: 2018-04-17 | End: 2018-04-18 | Stop reason: HOSPADM

## 2018-04-17 RX ORDER — PROPOFOL 10 MG/ML
INJECTION, EMULSION INTRAVENOUS AS NEEDED
Status: DISCONTINUED | OUTPATIENT
Start: 2018-04-17 | End: 2018-04-17 | Stop reason: HOSPADM

## 2018-04-17 RX ORDER — SODIUM CHLORIDE, SODIUM LACTATE, POTASSIUM CHLORIDE, CALCIUM CHLORIDE 600; 310; 30; 20 MG/100ML; MG/100ML; MG/100ML; MG/100ML
25 INJECTION, SOLUTION INTRAVENOUS CONTINUOUS
Status: DISCONTINUED | OUTPATIENT
Start: 2018-04-17 | End: 2018-04-18 | Stop reason: HOSPADM

## 2018-04-17 RX ORDER — RAMIPRIL 5 MG/1
5 CAPSULE ORAL DAILY
Status: DISCONTINUED | OUTPATIENT
Start: 2018-04-18 | End: 2018-04-18 | Stop reason: HOSPADM

## 2018-04-17 RX ORDER — SODIUM CHLORIDE, SODIUM LACTATE, POTASSIUM CHLORIDE, CALCIUM CHLORIDE 600; 310; 30; 20 MG/100ML; MG/100ML; MG/100ML; MG/100ML
50 INJECTION, SOLUTION INTRAVENOUS CONTINUOUS
Status: DISCONTINUED | OUTPATIENT
Start: 2018-04-17 | End: 2018-04-17 | Stop reason: HOSPADM

## 2018-04-17 RX ORDER — NITROGLYCERIN 0.4 MG/1
0.4 TABLET SUBLINGUAL
Status: DISCONTINUED | OUTPATIENT
Start: 2018-04-17 | End: 2018-04-18 | Stop reason: HOSPADM

## 2018-04-17 RX ORDER — ONDANSETRON 2 MG/ML
INJECTION INTRAMUSCULAR; INTRAVENOUS AS NEEDED
Status: DISCONTINUED | OUTPATIENT
Start: 2018-04-17 | End: 2018-04-17 | Stop reason: HOSPADM

## 2018-04-17 RX ORDER — EPHEDRINE SULFATE 50 MG/ML
INJECTION, SOLUTION INTRAVENOUS AS NEEDED
Status: DISCONTINUED | OUTPATIENT
Start: 2018-04-17 | End: 2018-04-17 | Stop reason: HOSPADM

## 2018-04-17 RX ORDER — FAMOTIDINE 20 MG/1
TABLET, FILM COATED ORAL
Status: COMPLETED
Start: 2018-04-17 | End: 2018-04-17

## 2018-04-17 RX ORDER — METOPROLOL SUCCINATE 50 MG/1
50 TABLET, EXTENDED RELEASE ORAL DAILY
Status: DISCONTINUED | OUTPATIENT
Start: 2018-04-18 | End: 2018-04-18 | Stop reason: HOSPADM

## 2018-04-17 RX ORDER — CEFAZOLIN SODIUM 2 G/50ML
2 SOLUTION INTRAVENOUS
Status: COMPLETED | OUTPATIENT
Start: 2018-04-17 | End: 2018-04-17

## 2018-04-17 RX ORDER — MORPHINE SULFATE 2 MG/ML
2 INJECTION, SOLUTION INTRAMUSCULAR; INTRAVENOUS
Status: DISCONTINUED | OUTPATIENT
Start: 2018-04-17 | End: 2018-04-17 | Stop reason: HOSPADM

## 2018-04-17 RX ORDER — NEOSTIGMINE METHYLSULFATE 5 MG/5 ML
SYRINGE (ML) INTRAVENOUS AS NEEDED
Status: DISCONTINUED | OUTPATIENT
Start: 2018-04-17 | End: 2018-04-17 | Stop reason: HOSPADM

## 2018-04-17 RX ADMIN — VECURONIUM BROMIDE FOR INJECTION 1 MG: 1 INJECTION, POWDER, LYOPHILIZED, FOR SOLUTION INTRAVENOUS at 10:52

## 2018-04-17 RX ADMIN — FENTANYL CITRATE 25 MCG: 50 INJECTION, SOLUTION INTRAMUSCULAR; INTRAVENOUS at 11:34

## 2018-04-17 RX ADMIN — SODIUM CHLORIDE, SODIUM LACTATE, POTASSIUM CHLORIDE, AND CALCIUM CHLORIDE 50 ML/HR: 600; 310; 30; 20 INJECTION, SOLUTION INTRAVENOUS at 08:42

## 2018-04-17 RX ADMIN — EPHEDRINE SULFATE 10 MG: 50 INJECTION, SOLUTION INTRAVENOUS at 10:27

## 2018-04-17 RX ADMIN — PROPOFOL 20 MG: 10 INJECTION, EMULSION INTRAVENOUS at 10:52

## 2018-04-17 RX ADMIN — FENTANYL CITRATE 25 MCG: 50 INJECTION, SOLUTION INTRAMUSCULAR; INTRAVENOUS at 10:25

## 2018-04-17 RX ADMIN — FAMOTIDINE 20 MG: 20 TABLET, FILM COATED ORAL at 08:41

## 2018-04-17 RX ADMIN — SODIUM CHLORIDE, SODIUM LACTATE, POTASSIUM CHLORIDE, AND CALCIUM CHLORIDE 25 ML/HR: 600; 310; 30; 20 INJECTION, SOLUTION INTRAVENOUS at 14:11

## 2018-04-17 RX ADMIN — LIDOCAINE HYDROCHLORIDE 40 MG: 20 INJECTION, SOLUTION EPIDURAL; INFILTRATION; INTRACAUDAL; PERINEURAL at 10:20

## 2018-04-17 RX ADMIN — Medication 2 MG: at 11:00

## 2018-04-17 RX ADMIN — ONDANSETRON 4 MG: 2 INJECTION INTRAMUSCULAR; INTRAVENOUS at 11:00

## 2018-04-17 RX ADMIN — PROPOFOL 80 MG: 10 INJECTION, EMULSION INTRAVENOUS at 10:20

## 2018-04-17 RX ADMIN — GLYCOPYRROLATE 0.3 MG: 0.2 INJECTION INTRAMUSCULAR; INTRAVENOUS at 11:00

## 2018-04-17 RX ADMIN — CEFAZOLIN SODIUM 2 G: 2 SOLUTION INTRAVENOUS at 10:25

## 2018-04-17 RX ADMIN — EPINEPHRINE 20 ML: 1 INJECTION, SOLUTION INTRAMUSCULAR; SUBCUTANEOUS at 10:30

## 2018-04-17 RX ADMIN — VECURONIUM BROMIDE FOR INJECTION 3 MG: 1 INJECTION, POWDER, LYOPHILIZED, FOR SOLUTION INTRAVENOUS at 10:21

## 2018-04-17 RX ADMIN — Medication 10 MG: at 11:13

## 2018-04-17 RX ADMIN — EPHEDRINE SULFATE 10 MG: 50 INJECTION, SOLUTION INTRAVENOUS at 10:29

## 2018-04-17 RX ADMIN — FENTANYL CITRATE 50 MCG: 50 INJECTION, SOLUTION INTRAMUSCULAR; INTRAVENOUS at 10:20

## 2018-04-17 NOTE — PROGRESS NOTES
TRANSFER - IN REPORT:    Verbal report received from [facility] on Akiko Paez  being received from PACU for routine post - op      Report consisted of patients Situation, Background, Assessment and   Recommendations(SBAR). Information from the following report(s) OR Summary was reviewed with the receiving nurse. Opportunity for questions and clarification was provided. 1220 Received pt via bed awake and alert however pt is confused to place and date. Family at [facility]. Lap sites to abdomen d/i. Oriented to call bell, phone and IS. Pt very forgetful.

## 2018-04-17 NOTE — IP AVS SNAPSHOT
[address] Patient: Eleanor Mullins MRN: [id number] POLLO:6/23/[id number] About your hospitalization You were admitted on:  April 17, 2018 You last received care in the:  [address] [place] [address],[address] You were discharged on:  April 18, 2018 Why you were hospitalized Your primary diagnosis was:  Not on File Your diagnoses also included:  Left Inguinal Hernia Follow-up Information Follow up With Details Comments Contact Info MD Willie [address] [address] 
[phone] Aurora Andres MD In 2 weeks for follow up [address] [facility] [address]  [facility] [address] 
[phone] Your Scheduled Appointments Monday April 30, 2018  9:30 AM EDT  
POST OP with Aurora Andres MD  
[address] [facility] [address] [facility] [address] [facility] [address]  
[phone] Discharge Orders None A check nae indicates which time of day the medication should be taken. My Medications CONTINUE taking these medications Instructions Each Dose to Equal  
 Morning Noon Evening Bedtime  
 aspirin delayed-release 81 mg tablet Your last dose was: Your next dose is: Take  by mouth daily. calcium carbonate-vitamin D3 600 mg(1,500mg) -800 unit Tab Your last dose was: Your next dose is: Take  by mouth. donepezil 5 mg tablet Commonly known as:  ARICEPT Your last dose was: Your next dose is: Take 1 Tab by mouth nightly. For one month. If tolerated, increase to 10 mg daily after one month 5 mg  
    
   
   
   
  
 metoprolol succinate 50 mg XL tablet Commonly known as:  TOPROL-XL Your last dose was: Your next dose is: Take  by mouth daily. NITROSTAT 0.4 mg SL tablet Generic drug:  nitroglycerin Your last dose was: Your next dose is:    
   
   
 by SubLINGual route every five (5) minutes as needed for Chest Pain. Omeprazole delayed release 20 mg tablet Commonly known as:  PRILOSEC D/R Your last dose was: Your next dose is: Take 1 Tab by mouth daily. Indications: Heartburn  
 20 mg  
    
   
   
   
  
 polyethylene glycol 17 gram/dose powder Commonly known as:  Manual Rouleau Your last dose was: Your next dose is: Take 17 g by mouth daily as needed. Indications: constipation 17 g  
    
   
   
   
  
 ramipril 5 mg capsule Commonly known as:  ALTACE Your last dose was: Your next dose is: Take  by mouth daily. REFRESH LIQUIGEL 1 % Dlgl Generic drug:  carboxymethylcellulose sodium Your last dose was: Your next dose is:    
   
   
 Apply  to eye. travoprost 0.004 % ophthalmic solution Commonly known as:  TRAVATAN Z Your last dose was: Your next dose is:    
   
   
 Administer 1 Drop to both eyes every evening. 1 Drop VITAMIN D3 1,000 unit tablet Generic drug:  cholecalciferol Your last dose was: Your next dose is: Take  by mouth daily. Discharge Instructions Instructions Following Surgery Patient: Nava Vazquez MRN: [id number]  SSN: [id number] YOB: 1928  Age: 80 y.o. Sex: female Activity · As tolerated, walking encourage, stairs are okay · Avoid strenuous activities - no lifting anything heavier than 15 pounds. · You may shower at home Diet · Regular diet Pain · Take pain medication as directed by your doctor ·  Call your doctor if pain is NOT relieved by medication Call your doctor if 
· Excessive bleeding that does not stop after holding mild pressure over the area · Temperature of 101 degrees F or above · Redness,excessive swelling or bruising, and/or green or yellow, smelly discharge from incision · If nausea and vomiting continues Follow-Up Phone Calls · Call the office at [phone] to make your follow-up appointment Appointment date/time: 2 weeks after the surgery. Dr. Luis Daniel Ureña cell phone number is [phone]. Please call me if you have any concerns or questions. DISCHARGE SUMMARY from Nurse PATIENT INSTRUCTIONS: 
 
After general anesthesia or intravenous sedation, for 24 hours or while taking prescription Narcotics: · Limit your activities · Do not drive and operate hazardous machinery · Do not make important personal or business decisions · Do  not drink alcoholic beverages · If you have not urinated within 8 hours after discharge, please contact your surgeon on call. Report the following to your surgeon: 
· Excessive pain, swelling, redness or odor of or around the surgical area · Temperature over 100.5 · Nausea and vomiting lasting longer than 4 hours or if unable to take medications · Any signs of decreased circulation or nerve impairment to extremity: change in color, persistent  numbness, tingling, coldness or increase pain · Any questions What to do at Home: 
Recommended activity: Activity as tolerated. See surgeon's instructions. If you experience any of the following symptoms severe pain, nausea and vomiting, fever above 100.5, bleeding or drainage from incisions, shortness of breath, please follow up with . *  Please give a list of your current medications to your Primary Care Provider.  
 
*  Please update this list whenever your medications are discontinued, doses are 
 changed, or new medications (including over-the-counter products) are added. *  Please carry medication information at all times in case of emergency situations. These are general instructions for a healthy lifestyle: No smoking/ No tobacco products/ Avoid exposure to second hand smoke Surgeon General's Warning:  Quitting smoking now greatly reduces serious risk to your health. Obesity, smoking, and sedentary lifestyle greatly increases your risk for illness A healthy diet, regular physical exercise & weight monitoring are important for maintaining a healthy lifestyle You may be retaining fluid if you have a history of heart failure or if you experience any of the following symptoms:  Weight gain of 3 pounds or more overnight or 5 pounds in a week, increased swelling in our hands or feet or shortness of breath while lying flat in bed. Please call your doctor as soon as you notice any of these symptoms; do not wait until your next office visit. Recognize signs and symptoms of STROKE: 
 
F-face looks uneven A-arms unable to move or move unevenly S-speech slurred or non-existent T-time-call 911 as soon as signs and symptoms begin-DO NOT go Back to bed or wait to see if you get better-TIME IS BRAIN. Warning Signs of HEART ATTACK Call 911 if you have these symptoms: 
? Chest discomfort. Most heart attacks involve discomfort in the center of the chest that lasts more than a few minutes, or that goes away and comes back. It can feel like uncomfortable pressure, squeezing, fullness, or pain. ? Discomfort in other areas of the upper body. Symptoms can include pain or discomfort in one or both arms, the back, neck, jaw, or stomach. ? Shortness of breath with or without chest discomfort. ? Other signs may include breaking out in a cold sweat, nausea, or lightheadedness. Don't wait more than five minutes to call [address]!  Fast action can save your life. Calling 911 is almost always the fastest way to get lifesaving treatment. Emergency Medical Services staff can begin treatment when they arrive  up to an hour sooner than if someone gets to the hospital by car. The discharge information has been reviewed with the patient. The patient verbalized understanding. Discharge medications reviewed with the patient and appropriate educational materials and side effects teaching were provided. Patient armband removed and shredded. ___________________________________________________________________________________________________________________________________ ACO Transitions of Care [facility] [address] Brigitte Albert offers a voluntary care coordination program to provide high quality service and care to [facility] fee-for-service beneficiaries. Vincenzo Ramyqing was designed to help you enhance your health and well-being through the following services: ? Transitions of Care  support for individuals who are transitioning from one care setting to another (example: Hospital to home). ? Chronic and Complex Care Coordination  support for individuals and caregivers of those with serious or chronic illnesses or with more than one chronic (ongoing) condition and those who take a number of different medications. If you meet specific medical criteria, a [address] [place] [address] Rd may call you directly to coordinate your care with your primary care physician and your other care providers. For questions about the [facility] programs, please, contact your physicians office. For general questions or additional information about Accountable Care Organizations: 
Please visit www.medicare.gov/acos. html or call 1-800-MEDICARE (4-684.869.8595) TTY users should call 0-509.445.6355. [employer] Announcement We are excited to announce that we are making your provider's discharge notes available to you in Foods You Can. You will see these notes when they are completed and signed by the physician that discharged you from your recent hospital stay. If you have any questions or concerns about any information you see in [employer] You Can, please call the Health Information Department where you were seen or reach out to your Primary Care Provider for more information about your plan of care. Introducing [facility] & HEALTH SERVICES! Isael Parker introduces [employer] You Can patient portal. Now you can access parts of your medical record, email your doctor's office, and request medication refills online. 1. In your internet browser, go to [email or web address]. Office Max/[employer] 2. Click on the First Time User? Click Here link in the Sign In box. You will see the New Member Sign Up page. 3. Enter your [employer] Access Code exactly as it appears below. You will not need to use this code after youve completed the sign-up process. If you do not sign up before the expiration date, you must request a new code. · [employer] You Can Access Code: [email or web address] Expires: 5/15/2018  3:19 PM 
 
4. Enter the last four digits of your Social Security Number (xxxx) and Date of Birth (mm/dd/yyyy) as indicated and click Submit. You will be taken to the next sign-up page. 5. Create a [employer] You Can ID. This will be your [employer] You Can login ID and cannot be changed, so think of one that is secure and easy to remember. 6. Create a [employer] password. You can change your password at any time. 7. Enter your Password Reset Question and Answer. This can be used at a later time if you forget your password. 8. Enter your e-mail address. You will receive e-mail notification when new information is available in [address] 9. Click Sign Up. You can now view and download portions of your medical record.  
10. Click the Download Summary menu link to download a portable copy of your medical information. If you have questions, please visit the Frequently Asked Questions section of the [employer] website. Remember, [employer] Vega is NOT to be used for urgent needs. For medical emergencies, dial 911. Now available from your iPhone and Android! Introducing Mason Flanagan As a [facility] patient, I wanted to make you aware of our electronic visit tool called Mason Flanagan. [employer] allows you to connect within minutes with a medical provider 24 hours a day, seven days a week via a mobile device or tablet or logging into a secure website from your computer. You can access Mason Flanagan from anywhere in the [place]. A virtual visit might be right for you when you have a simple condition and feel like you just dont want to get out of bed, or cant get away from work for an appointment, when your regular [facility] provider is not available (evenings, weekends or holidays), or when youre out of town and need minor care. Electronic visits cost only $49 and if the [employer]Ed[employer] provider determines a prescription is needed to treat your condition, one can be electronically transmitted to a nearby pharmacy*. Please take a moment to enroll today if you have not already done so. The enrollment process is free and takes just a few minutes. To enroll, please download the MuscleGenes you to your tablet or phone, or visit [email or web address] to enroll on your computer. And, as an [address] [place] [address] patient with a Sok[employer] account, the results of your visits will be scanned into your electronic medical record and your primary care provider will be able to view the scanned results. We urge you to continue to see your regular [facility] provider for your ongoing medical care.   And while your primary care provider may not be the one available when you seek a Mason Flanagan virtual visit, the peace of mind you get from getting a real diagnosis real time can be priceless. For more information on Mason Jerseyjaime, view our Frequently Asked Questions (FAQs) at [email or web address]. Sincerely, 
 
Rita Marks MD 
Chief Medical Officer Yazmin Price *:  certain medications cannot be prescribed via Mason Flanagan Providers Seen During Your Hospitalization Provider Specialty Primary office phone Stella Kitchen MD Surgery [phone] Your Primary Care Physician (PCP) Primary Care Physician Office Phone Office Fax Lyssa Coffey [phone] You are allergic to the following Allergen Reactions Alendronate Unknown (comments) Recent Documentation Height Weight BMI OB Status Smoking Status 1.499 m 41.3 kg 18.38 kg/m2 Postmenopausal Former Smoker Emergency Contacts Name Discharge Info Relation Home Work Mobile [facility] DISCHARGE CAREGIVER [3] Child [2] [phone] Patient Belongings The following personal items are in your possession at time of discharge: 
  Dental Appliances: None  Visual Aid: Glasses, With patient      Home Medications: None   Jewelry: Ring  Clothing: Undergarments, Pants, Shirt, Socks, Jacket/Coat, Footwear    Other Valuables: None Discharge Instructions Attachments/References INGUINAL HERNIA REPAIR: POST-OP (ENGLISH) Patient Handouts Inguinal Hernia Repair: What to Expect at Home Your Recovery After surgery to repair a hernia, you're likely to have pain for a few days. You may also feel like you have the flu. And you may have a low fever and feel tired and nauseated. This is common. You should start to feel better after a few days. And you'll probably feel much better in 7 days. For a few weeks you may feel twinges or pulling in the groin area when you move. Men may have some bruising on the scrotum and along the penis.  This is normal. 
 This care sheet gives you a general idea about how long it will take for you to recover. But each person recovers at a different pace. Follow the steps below to get better as quickly as possible. How can you care for yourself at home? Activity ? · Rest when you feel tired. ? · You may shower 24 to 48 hours after surgery, if your doctor okays it. Pat the incision dry. Do not take a bath for the first 2 weeks, or until your doctor tells you it is okay. ? · Allow the area to heal. Don't move quickly or lift anything heavy until you are feeling better. ? · Be active. Walking is a good choice. ? · You most likely can return to light activity after 1 to 3 weeks, depending on the type of surgery you had. Diet ? · You can eat your normal diet. If your stomach is upset, try bland, low-fat foods like plain rice, broiled chicken, toast, and yogurt. ? · If your bowel movements are not regular right after surgery, try to avoid constipation and straining. Drink plenty of water. Your doctor may suggest fiber, a stool softener, or a mild laxative. Medicines ? · Be safe with medicines. Read and follow all instructions on the label. ¨ If the doctor gave you a prescription medicine for pain, take it as prescribed. ¨ If you are not taking a prescription pain medicine, ask your doctor if you can take an over-the-counter medicine. ? · Your doctor will tell you if and when you can restart your medicines. He or she will also give you instructions about taking any new medicines. Incision care ? · You will have a dressing over the cut (incision). A dressing helps the cut heal and protects it. Your doctor will tell you how to take care of this. ? · If you have skin adhesive on the cut, leave it on until it falls off. Skin adhesive is also called liquid stitches or glue. ? · If you have strips of tape on the cut, leave the tape on for a week or until it falls off. ? · If you had stitches, your doctor will tell you when to come back to have them removed. ? · Wash the area daily with warm, soapy water, and pat it dry. Don't use hydrogen peroxide or alcohol. They can slow healing. ?Ice ? · Put ice or a cold pack on the area for 10 to 20 minutes at a time. Try to do this every 1 to 2 hours for the next 3 days (when you are awake) or until the swelling goes down. Put a thin cloth between the ice and your skin. Follow-up care is a key part of your treatment and safety. Be sure to make and go to all appointments, and call your doctor if you are having problems. It's also a good idea to know your test results and keep a list of the medicines you take. When should you call for help? Call 911 anytime you think you may need emergency care. For example, call if: 
? · You passed out (lost consciousness). ? · You are short of breath. ?Call your doctor now or seek immediate medical care if: 
? · You have pain that does not get better after you take pain medicine. ? · You have loose stitches, or your incision comes open. ? · Bright red blood has soaked through your bandage. ? · You are sick to your stomach or cannot drink fluids. ? · You have signs of a blood clot in your leg (called a deep vein thrombosis), such as: 
¨ Pain in your calf, back of the knee, thigh, or groin. ¨ Redness and swelling in your leg or groin. ? · You cannot pass stools or gas. ? · You have symptoms of infection, such as: 
¨ Increased pain, swelling, warmth, or redness. ¨ Red streaks leading from the incision. ¨ Pus draining from the incision. ¨ A fever. ? Watch closely for changes in your health, and be sure to contact your doctor if you have any problems. Where can you learn more? Go to http://anneliese-natalio.info/. Enter H482 in the search box to learn more about \"Inguinal Hernia Repair: What to Expect at Home. \" Current as of: May 12, 2017 Content Version: 11.4 © 0240-2419 Healthwise, Incorporated. Care instructions adapted under license by [employer] (which disclaims liability or warranty for this information). If you have questions about a medical condition or this instruction, always ask your healthcare professional. [address] any warranty or liability for your use of this information. Please provide this summary of care documentation to your next provider. Signatures-by signing, you are acknowledging that this After Visit Summary has been reviewed with you and you have received a copy. Patient Signature:  ____________________________________________________________ Date:  ____________________________________________________________  
  
Khalif Rea Provider Signature:  ____________________________________________________________ Date:  ____________________________________________________________

## 2018-04-17 NOTE — PERIOP NOTES
1117  Patient received in PACU and connected to monitors. Vital signs stable. RN at bedside. Will continue to monitor. [address] to patient's family via phone in waiting area re status update and plan of care. Pt oriented to self, reoriented to place and situation. 1210  TRANSFER - OUT REPORT:    Verbal report given to Iqra Paez RN(name) on [facility]  being transferred to [facility] for routine post - op       Report consisted of patients Situation, Background, Assessment and   Recommendations(SBAR). Information from the following report(s) SBAR, Kardex, OR Summary, Intake/Output and MAR was reviewed with the receiving nurse. Lines:   Peripheral IV 04/17/18 Left Arm (Active)   Site Assessment Clean, dry, & intact 4/17/2018 11:47 AM   Phlebitis Assessment 0 4/17/2018 11:47 AM   Infiltration Assessment 0 4/17/2018 11:47 AM   Dressing Status Clean, dry, & intact 4/17/2018 11:47 AM   Dressing Type Transparent;Tape 4/17/2018 11:47 AM   Hub Color/Line Status Blue; Infusing 4/17/2018 11:47 AM   Action Taken Open ports on tubing capped 4/17/2018 11:47 AM   Alcohol Cap Used Yes 4/17/2018 11:47 AM        Opportunity for questions and clarification was provided.       Patient transported with:   [employer]

## 2018-04-17 NOTE — PROGRESS NOTES
Date of Surgery Update:  Guicho Reyez was seen and examined. History and physical has been reviewed. The patient has been examined. There have been no significant clinical changes since the completion of the originally dated History and Physical. Will proceed with robotic, possible open repair of abdominal wall and inguinal hernias with meshes.      Signed By: Devonte Groves MD     April 17, 2018 9:54 AM

## 2018-04-17 NOTE — ANESTHESIA POSTPROCEDURE EVALUATION
Post-Anesthesia Evaluation and Assessment    Patient: David Martinez MRN: [id number]  SSN: [id number]    YOB: 1928  Age: 80 y.o. Sex: female       Cardiovascular Function/Vital Signs  Visit Vitals    /55 (BP 1 Location: Right arm, BP Patient Position: At rest;Lying right side)    Pulse 67    Temp 36.7 °C (98 °F)    Resp 13    Ht 4' 11\" (1.499 m)    Wt 41.3 kg (91 lb)    SpO2 98%    BMI 18.38 kg/m2       Patient is status post general anesthesia for Procedure(s):  Robotic repair of large inguinal hernia with mesh. .    Nausea/Vomiting: None    Postoperative hydration reviewed and adequate. Pain:  Pain Scale 1: Adult Nonverbal Pain Scale (04/17/18 1149)  Pain Intensity 1: 0 (04/17/18 1147)   Managed    Neurological Status:   Neuro (WDL): Within Defined Limits (04/17/18 1147)  Neuro  Neurologic State: Appropriate for age;Sleeping;Eyes open to voice (04/17/18 1147)  LUE Motor Response: Purposeful;Spontaneous  (04/17/18 1147)  LLE Motor Response: Purposeful;Spontaneous  (04/17/18 1147)  RUE Motor Response: Purposeful;Spontaneous  (04/17/18 1147)  RLE Motor Response: Purposeful;Spontaneous  (04/17/18 1147)   At baseline    Mental Status and Level of Consciousness: Alert and oriented     Pulmonary Status:   O2 Device: Room air (04/17/18 1147)   Adequate oxygenation and airway patent    Complications related to anesthesia: None    Post-anesthesia assessment completed.  No concerns    Signed By: Ruthie Cochran MD     April 17, 2018

## 2018-04-17 NOTE — BRIEF OP NOTE
BRIEF OPERATIVE NOTE    Date of Procedure: 4/17/2018   Preoperative Diagnosis: LEFT GROIN HERNIA K40.90 ABDOMINAL WALL HERNIA K43.9  Postoperative Diagnosis: * No post-op diagnosis entered *    Procedure(s):  Robotic repair of large inguinal hernia with mesh. Surgeon(s) and Role:     * Fern Deluna MD - Primary  Surgical Staff:  Circ-1: Malka Man RN  Scrub Tech-1: Del Chu  Surg Asst-1: Dahlia Ruby  Event Time In   Incision Start 1030   Incision Close      Anesthesia: General   Estimated Blood Loss: Minimal  Specimens: * No specimens in log *   Findings: Large direct inguinal hernia. Complications: None  Implants:   Implant Name Type Inv.  Item Serial No.  Lot No. LRB No. Used Action   MESH ABSORB SURG PROGRIP 10X15 -- PROGRIP - [id number]   MESH ABSORB SURG PROGRIP 10X15 -- PROGRIP [id number] COVIDIEN  SURGICAL [id number] N/A 1 Implanted

## 2018-04-17 NOTE — ANESTHESIA PREPROCEDURE EVALUATION
Anesthetic History   No history of anesthetic complications            Review of Systems / Medical History  Patient summary reviewed and pertinent labs reviewed    Pulmonary  Within defined limits                 Neuro/Psych   Within defined limits           Cardiovascular              CAD    Exercise tolerance: >4 METS     GI/Hepatic/Renal     GERD           Endo/Other  Within defined limits           Other Findings   Comments: Documentation of current medication  Current medications obtained, documented and obtained? YES      Risk Factors for Postoperative nausea/vomiting:       History of postoperative nausea/vomiting? NO       Female? YES       Motion sickness? NO       Intended opioid administration for postoperative analgesia? YES      Smoking Abstinence:  Current Smoker? NO  Elective Surgery? YES  Seen preoperatively by anesthesiologist or proxy prior to day of surgery? YES  Pt abstained from smoking 24 hours prior to anesthesia?  N/A    Preventive care/screening for High Blood Pressure:  Aged 18 years and older: YES  Screened for high blood pressure: YES  Patients with high blood pressure referred to primary care provider   for BP management: YES                 Physical Exam    Airway  Mallampati: II  TM Distance: 4 - 6 cm  Neck ROM: normal range of motion   Mouth opening: Normal     Cardiovascular  Regular rate and rhythm,  S1 and S2 normal,  no murmur, click, rub, or gallop  Rhythm: regular  Rate: normal         Dental    Dentition: Edentulous, Full upper dentures and Full lower dentures     Pulmonary  Breath sounds clear to auscultation               Abdominal  GI exam deferred       Other Findings            Anesthetic Plan    ASA: 3  Anesthesia type: general          Induction: Intravenous  Anesthetic plan and risks discussed with: Patient

## 2018-04-17 NOTE — PROGRESS NOTES
1315 patient in bed, visitors present at bedside, call bell within reach, no acute distress noted, tray at bedside     1609 assisted patient to bathroom and assisted patient back to bed, call bell within reach, no acute distress noted, patient's son present at bedside     1915 Bedside and Verbal shift change report given to Abelardo Cao (oncoming nurse) by Nini Guo RN   (offgoing nurse). Report included the following information SBAR, Kardex and MAR.  Bed Time Roque alarm remain in place

## 2018-04-18 VITALS
RESPIRATION RATE: 18 BRPM | SYSTOLIC BLOOD PRESSURE: 120 MMHG | TEMPERATURE: 97.8 F | HEIGHT: 59 IN | HEART RATE: 65 BPM | OXYGEN SATURATION: 96 % | BODY MASS INDEX: 18.35 KG/M2 | DIASTOLIC BLOOD PRESSURE: 49 MMHG | WEIGHT: 91 LBS

## 2018-04-18 PROCEDURE — 99218 HC RM OBSERVATION: CPT

## 2018-04-18 PROCEDURE — 74011250637 HC RX REV CODE- 250/637: Performed by: SURGERY

## 2018-04-18 RX ADMIN — METOPROLOL SUCCINATE 50 MG: 50 TABLET, EXTENDED RELEASE ORAL at 08:52

## 2018-04-18 RX ADMIN — POLYETHYLENE GLYCOL (3350) 17 G: 17 POWDER, FOR SOLUTION ORAL at 08:51

## 2018-04-18 RX ADMIN — DOCUSATE SODIUM 100 MG: 100 CAPSULE, LIQUID FILLED ORAL at 08:52

## 2018-04-18 RX ADMIN — RAMIPRIL 5 MG: 5 CAPSULE ORAL at 08:52

## 2018-04-18 NOTE — PROGRESS NOTES
Patient seen and examined. Doing well. No pain. Normal vitals. She is not confused. Knew how I am and where she is. Abdomen is soft and non-tender. Plan:  Discharge home today.

## 2018-04-18 NOTE — PROGRESS NOTES
Made nurse aware patient is returning to [facility] with family providing transportation.  Call report to [phone] ask for jasmin Discharge summary and face sheet faxed to

## 2018-04-18 NOTE — PROGRESS NOTES
Attempted to call report to [facility] [place] care manager @ [phone], unsuccessful, message left to return call.

## 2018-04-18 NOTE — DISCHARGE INSTRUCTIONS
Instructions Following Surgery    Patient: David Costello MRN: [id number]  SSN: [id number]    YOB: 1928  Age: 80 y.o. Sex: female      Activity  · As tolerated, walking encourage, stairs are okay  · Avoid strenuous activities - no lifting anything heavier than 15 pounds. · You may shower at home    Diet  · Regular diet    Pain  · Take pain medication as directed by your doctor  ·  Call your doctor if pain is NOT relieved by medication    Call your doctor if  · Excessive bleeding that does not stop after holding mild pressure over the area  · Temperature of 101 degrees F or above  · Redness,excessive swelling or bruising, and/or green or yellow, smelly discharge from incision  · If nausea and vomiting continues    Follow-Up Phone Calls  · Call the office at [phone] to make your follow-up appointment    Appointment date/time: 2 weeks after the surgery. Dr. David Hurst cell phone number is [phone]. Please call me if you have any concerns or questions. DISCHARGE SUMMARY from Nurse    PATIENT INSTRUCTIONS:    After general anesthesia or intravenous sedation, for 24 hours or while taking prescription Narcotics:  · Limit your activities  · Do not drive and operate hazardous machinery  · Do not make important personal or business decisions  · Do  not drink alcoholic beverages  · If you have not urinated within 8 hours after discharge, please contact your surgeon on call. Report the following to your surgeon:  · Excessive pain, swelling, redness or odor of or around the surgical area  · Temperature over 100.5  · Nausea and vomiting lasting longer than 4 hours or if unable to take medications  · Any signs of decreased circulation or nerve impairment to extremity: change in color, persistent  numbness, tingling, coldness or increase pain  · Any questions    What to do at Home:  Recommended activity: Activity as tolerated. See surgeon's instructions.     If you experience any of the following symptoms severe pain, nausea and vomiting, fever above 100.5, bleeding or drainage from incisions, shortness of breath, please follow up with . *  Please give a list of your current medications to your Primary Care Provider. *  Please update this list whenever your medications are discontinued, doses are      changed, or new medications (including over-the-counter products) are added. *  Please carry medication information at all times in case of emergency situations. These are general instructions for a healthy lifestyle:    No smoking/ No tobacco products/ Avoid exposure to second hand smoke  Surgeon General's Warning:  Quitting smoking now greatly reduces serious risk to your health. Obesity, smoking, and sedentary lifestyle greatly increases your risk for illness    A healthy diet, regular physical exercise & weight monitoring are important for maintaining a healthy lifestyle    You may be retaining fluid if you have a history of heart failure or if you experience any of the following symptoms:  Weight gain of 3 pounds or more overnight or 5 pounds in a week, increased swelling in our hands or feet or shortness of breath while lying flat in bed. Please call your doctor as soon as you notice any of these symptoms; do not wait until your next office visit. Recognize signs and symptoms of STROKE:    F-face looks uneven    A-arms unable to move or move unevenly    S-speech slurred or non-existent    T-time-call 911 as soon as signs and symptoms begin-DO NOT go       Back to bed or wait to see if you get better-TIME IS BRAIN. Warning Signs of HEART ATTACK     Call 911 if you have these symptoms:   Chest discomfort. Most heart attacks involve discomfort in the center of the chest that lasts more than a few minutes, or that goes away and comes back. It can feel like uncomfortable pressure, squeezing, fullness, or pain.    Discomfort in other areas of the upper body. Symptoms can include pain or discomfort in one or both arms, the back, neck, jaw, or stomach.  Shortness of breath with or without chest discomfort.  Other signs may include breaking out in a cold sweat, nausea, or lightheadedness. Don't wait more than five minutes to call 911 - MINUTES MATTER! Fast action can save your life. Calling 911 is almost always the fastest way to get lifesaving treatment. Emergency Medical Services staff can begin treatment when they arrive -- up to an hour sooner than if someone gets to the hospital by car. The discharge information has been reviewed with the patient. The patient verbalized understanding. Discharge medications reviewed with the patient and appropriate educational materials and side effects teaching were provided. Patient armband removed and shredded.   ___________________________________________________________________________________________________________________________________

## 2018-04-18 NOTE — PROGRESS NOTES
Care Management Interventions  PCP Verified by CM: Yes  Palliative Care Criteria Met (RRAT>21 & CHF Dx)?: No  Mode of Transport at Discharge: Self  Transition of Care Consult (CM Consult): Discharge Planning  Discharge Durable Medical Equipment: No  Physical Therapy Consult: No  Occupational Therapy Consult: No  Current Support Network:  ([facility])  Confirm Follow Up Transport: Family  Plan discussed with Pt/Family/Caregiver: Yes  Discharge Location  Discharge Placement: Assisted Living     Spoke with patient in room, and she lives at [facility] and used a walker and a cane . She is moderately independent with ADL's. She verified her demographics, insurance and PCP as correct on the facesheet. Patient has designated _son _and DIL______________ to participate in his/her discharge plan and to receive any needed information. Donavan Oh [phone]. Patient and/or next of kin has been given and has signed the [facility] Outpatient Observation  Notification letter and all questions answered. Copy of this notice given to patient and copy placed on chart. Patient and/or next of kin has been given the Outpatient Observation Information and Notification letter and all questions answered. Patient and/or next of kin has been given the Outpatient Observation Information and Notification letter and all questions answered. DIL June and she stated that she will be picking up the patient and transporting here to [facility]. Spoke with Arely Zamora and Cecile Hackett [facility] coordinator and made them aware is returning. Today.     Reason for Admission:   repair of large inguinal hernia with mesh                   RRAT Score:          16           Plan for utilizing home health:   Not needed at this time                       Likelihood of Readmission:  Low to moderate                         Transition of Care Plan:   [facility]

## 2018-04-18 NOTE — DISCHARGE SUMMARY
General Surgery Discharge Note    Admission Date: 4/17/2018    Discharge Date: 4/18/2018    Admission Diagnosis:  Left abdominal wall hernia    Discharge Diagnosis:  Left inguinal hernia    Procedures Performed:   Robotic left inguinal hernia repair with mesh. Hospital Course:  Patient was admitted on 4/17/2018 for elective left inguinal hernia repair. Operation was without significant complication. Patient admitted to the floor postoperatively, monitored as per protocol. Diet sequentially advanced beginning POD 1, pain medications transitioned to oral during the hospital course. At the time of discharge the patient is afebrile, vital signs stable,  tolerating a diet, voiding spontaneously, ambulatory with adequate pain control with oral medications and clear surgical sites without evidence of infection. Condition on Discharge:  Stable    Follow-up care :   Follow-up in 2 weeks in the clinic ([address] [place] [address] instructions provided)    Disposition:  Home    Discharge Medications:      Current Facility-Administered Medications   Medication Dose Route Frequency    metoprolol succinate (TOPROL-XL) XL tablet 50 mg  50 mg Oral DAILY    nitroglycerin (NITROSTAT) tablet 0.4 mg  0.4 mg SubLINGual Q5MIN PRN    ramipril (ALTACE) capsule 5 mg  5 mg Oral DAILY    lactated Ringers infusion  25 mL/hr IntraVENous CONTINUOUS    docusate sodium (COLACE) capsule 100 mg  100 mg Oral DAILY    polyethylene glycol (MIRALAX) packet 17 g  17 g Oral DAILY    morphine 2 mg  2 mg IntraVENous Q2H PRN    oxyCODONE-acetaminophen (PERCOCET) 5-325 mg per tablet 1 Tab  1 Tab Oral Q4H PRN    ondansetron (ZOFRAN) injection 6 mg  6 mg IntraVENous Q6H PRN       Local wound care with daily showers, keep wounds clean and dry    Activity: as desired, no lifting greater than 15lbs or situps for 30 days    Special Instructions:   No driving until activity is not influenced by incisional pain and off narcotics   No bath or hot tub until wounds are healed   Notify [facility] for a fever>101, redness or foul-smelling  drainage from incision, or worsening pain. Followup with surgeon in 10-14 days.

## 2018-04-18 NOTE — PROGRESS NOTES
Problem: Discharge Planning  Goal: *Discharge to safe environment  Outcome: Progressing Towards Goal  Home to [facility]

## 2018-04-18 NOTE — PROGRESS NOTES
1930 Received pt in the no acute distress. Pt confused trying to get oob, bed alarm set for safety and bed remain to the lowest position, call bell within reach. Will continue to monitor closely. 2021 Remain in the bed resting, no acute distress denied pain \"i'm not in pain\" and no s/sx of pain/discomfort noted at this time. Call bell within reach, bed alarm on, bed to lowest position will continue to monitor closely. 2142 In the bed sleeping no acute distress call bell within reach. 2314 Sleeping no acute distress call bell within reach. 0128 Assisted to the bathroom ambulates with gait steady. Back to bed bed alarm on, bed remain to the lowest position call bell within reach. 56 OOB assisted to bathroom voided and stable no acute distress. Back to bed call bell within reach, bed alarm remain on for safety and bed to the lowest position will continue to monitor. Bedside and Verbal shift change report given to NADEEN Bueno (oncoming nurse) by Luda Bowie RN (offgoing nurse). Report included the following information SBAR, Kardex, Intake/Output and MAR.

## 2018-04-18 NOTE — OP NOTES
[facility]  OPERATIVE REPORT    Edilia Pantoja  MR#: [id number]  : 01/10/1928  ACCOUNT #: [de-identified]   DATE OF SERVICE: 2018    SURGEON:  Luda Malave MD    PREOPERATIVE DIAGNOSIS:  Left groin and abdominal wall hernia. POSTOPERATIVE DIAGNOSIS:  Left large direct inguinal hernia. PROCEDURE PERFORMED:  Robotic repair of left inguinal hernia with placement of mesh. ANESTHESIA:  General.    COMPLICATIONS:  None. SPECIMEN:  None. BLOOD LOSS:  Minimal.    DETAIL OF PROCEDURE:  The patient was put operating room. Anesthesia was induced. Scrubbing and draping of the abdomen was in usual manner. A timeout was performed. A skin incision in the left upper quadrant was performed. Veress needle was inserted. Abdomen was insufflated. At this point, a skin incision in the supraumbilical area was performed. An 8 mm port was inserted. Abdomen was explored. There was a left direct inguinal hernia that was large defect, but it was also a huge sac. At this point, 2 wo other 8 mm port were placed on the left and right side of the abdominal wall. The robot was docked. The peritoneum was opened in the left groin. The preperitoneal space was dissected. The inferior epigastric vessels were identified and protected. At this point, the hernia sac was completely dissected and reduced into the abdomen. The round ligament was identified. It was divided. At this point, a 15 x 10 ProGrip Covidien mesh was placed in the preperitoneal space. This was fixed with interrupted 2-0 Vicryl sutures and the peritoneum was closed on top of the mesh with a 2-0 V-Loc suture. Hemostasis well secured. Instruments were removed. The robot was undocked and the skin incisions were closed with 4-0 Monocryl.       MD Karen Dailey / Olive Hatfield  D: 2018 11:16     T: 2018 06:56  JOB #: [id number]

## 2018-04-18 NOTE — PROGRESS NOTES
Problem: Falls - Risk of  Goal: *Absence of Falls  Document Rayo Fall Risk and appropriate interventions in the flowsheet.    Outcome: Progressing Towards Goal  Fall Risk Interventions:  Mobility Interventions: Bed/chair exit alarm, Communicate number of staff needed for ambulation/transfer, PT Consult for mobility concerns, Utilize walker, cane, or other assitive device    Mentation Interventions: Adequate sleep, hydration, pain control, Door open when patient unattended, Evaluate medications/consider consulting pharmacy    Medication Interventions: Bed/chair exit alarm, Patient to call before getting OOB    Elimination Interventions: Bed/chair exit alarm, Call light in reach, Patient to call for help with toileting needs

## 2018-04-18 NOTE — PROGRESS NOTES
I have reviewed discharge instructions with the caregiver Renetta Portillo daughter in law. The caregiver verbalized understanding.

## 2018-04-19 ENCOUNTER — PATIENT OUTREACH (OUTPATIENT)
Dept: INTERNAL MEDICINE CLINIC | Age: 83
End: 2018-04-19

## 2018-04-19 NOTE — PROGRESS NOTES
Hospital Discharge Follow-Up      Date/Time:  2018 10:50 AM    Patient was admitted to [facility] on 18 and discharged on 18 for Left Inguinal Hernia Repair. The physician discharge summary was available at the time of outreach. Patient's family was contacted within 2 business days of discharge. Patient lives at [facility] in [place] in  [address] I spoke with nurse at [facility] and she stated that pt was doing fine. Top Challenges reviewed with the provider   none         Method of communication with provider :chart routing    Inpatient RRAT score: 17 Mod Risk  Was this a readmission? no   Patient stated reason for the readmission: n/a     Nurse Navigator (NN) contacted the family by telephone to perform post hospital discharge assessment. Verified name and  with family as identifiers. Provided introduction to self, and explanation of the Nurse Navigator role. Reviewed discharge instructions and red flags with family who verbalized understanding. Family given an opportunity to ask questions and does not have any further questions or concerns at this time. The family agrees to contact the PCP office for questions related to their healthcare. NN provided contact information for future reference. Summary of patients top problems:  1. Recent surgery  2. History of CAD, Valvular disorder  3.  Advanced age of 80    [address] [facility] Hermesreza orders at discharge: [address]: n/a  Date of initial visit: [facility] ordered/company: has a walker, cane  Durable Medical Equipment received: none new    Barriers to care? transportation    Advance Care Planning:   Does patient have an Advance Directive:  reviewed and current       Medication:     New Medications at Discharge: none  Changed Medications at Discharge: none  Discontinued Medications at Discharge: none    Med rec not performed at this time - nurse at [address] [facility] [address] was unable to complete at this time. Referral to Pharm D needed: no     Current Outpatient Prescriptions   Medication Sig    polyethylene glycol (MIRALAX) 17 gram/dose powder Take 17 g by mouth daily as needed. Indications: constipation    metoprolol succinate (TOPROL-XL) 50 mg XL tablet Take  by mouth daily.  ramipril (ALTACE) 5 mg capsule Take  by mouth daily.  nitroglycerin (NITROSTAT) 0.4 mg SL tablet by SubLINGual route every five (5) minutes as needed for Chest Pain.  Omeprazole delayed release (PRILOSEC D/R) 20 mg tablet Take 1 Tab by mouth daily. Indications: Heartburn    donepezil (ARICEPT) 5 mg tablet Take 1 Tab by mouth nightly. For one month. If tolerated, increase to 10 mg daily after one month    cholecalciferol (VITAMIN D3) 1,000 unit tablet Take  by mouth daily.  aspirin delayed-release 81 mg tablet Take  by mouth daily.  calcium carbonate-vitamin D3 600 mg(1,500mg) -800 unit tab Take  by mouth.  travoprost (TRAVATAN Z) 0.004 % ophthalmic solution Administer 1 Drop to both eyes every evening.  carboxymethylcellulose sodium (REFRESH LIQUIGEL) 1 % dlgl Apply  to eye. No current facility-administered medications for this visit. There are no discontinued medications. PCP/Specialist follow up: Future Appointments  Date Time Provider Glenny Moore   4/30/2018 9:30 AM aCthryn Palomo MD [facility] GLENNY SCHED          Goals      Returns to baseline activity level.  Understands red flags post discharge.

## 2018-04-30 ENCOUNTER — OFFICE VISIT (OUTPATIENT)
Dept: SURGERY | Age: 83
End: 2018-04-30

## 2018-04-30 VITALS
TEMPERATURE: 95.4 F | SYSTOLIC BLOOD PRESSURE: 114 MMHG | DIASTOLIC BLOOD PRESSURE: 49 MMHG | BODY MASS INDEX: 18.14 KG/M2 | HEIGHT: 59 IN | WEIGHT: 90 LBS | HEART RATE: 67 BPM | RESPIRATION RATE: 16 BRPM

## 2018-04-30 DIAGNOSIS — Z09 POSTOPERATIVE EXAMINATION: Primary | ICD-10-CM

## 2018-04-30 NOTE — LETTER
4/30/2018 9:22 AM 
 
Patient:  Chintan Conception YOB: 1928 Date of Visit: 4/30/2018 May Paget, MD 
[facility] [address] [address] [id number] VIA In Basket Dear May Paget, MD, Thank you for referring Ms. Vianey Gomez to [place] [address] for evaluation and treatment. Below are the relevant portions of my assessment and plan of care. Thank you very much for your referral of Ms. Vianey Gomez. If you have questions, please do not hesitate to call me. I look forward to following Ms. Susannah Diaz along with you and will keep you updated as to her progress. Sincerely, Kayla Richardson MD

## 2018-04-30 NOTE — MR AVS SNAPSHOT
[address] [address] 
[phone] Patient: Patricio Worley MRN: [id number] GEV:[id number] Visit Information Date & Time Provider Department Dept. Phone Encounter #  
 4/30/2018  9:30 AM MD [facility] [phone] [id number] Upcoming Health Maintenance Date Due DTaP/Tdap/Td series (1 - Tdap) 1/10/1949 ZOSTER VACCINE AGE 60> 11/10/1987 GLAUCOMA SCREENING Q2Y 1/10/1993 Bone Densitometry (Dexa) Screening 1/10/1993 Influenza Age 5 to Adult 8/1/2018 MEDICARE YEARLY EXAM 11/8/2018 Allergies as of 4/30/2018  Review Complete On: 4/30/2018 By: Naren Precise Severity Noted Reaction Type Reactions Alendronate  07/27/2008    Unknown (comments) Current Immunizations  Reviewed on 3/9/2018 Name Date Influenza High Dose Vaccine PF 11/1/2017 Influenza Vaccine 10/2/2017, 10/18/2012 12:00 AM  
 Pneumococcal Conjugate (PCV-13) 5/28/2015 Pneumococcal Polysaccharide (PPSV-23) 10/18/2012 12:00 AM, 10/29/2007 TB Skin Test (PPD) Intradermal 8/4/2017 Not reviewed this visit You Were Diagnosed With   
  
 Codes Comments Postoperative examination    -  Primary ICD-10-CM: H21 ICD-9-CM: V67.00 Vitals BP Pulse Temp Resp Height(growth percentile) Weight(growth percentile) 114/49 (BP 1 Location: Right arm, BP Patient Position: Sitting) 67 95.4 °F (35.2 °C) (Oral) 16 4' 11\" (1.499 m) 90 lb (40.8 kg) BMI OB Status Smoking Status 18.18 kg/m2 Postmenopausal Former Smoker BMI and BSA Data Body Mass Index Body Surface Area  
 18.18 kg/m 2 1.3 m 2 Your Updated Medication List  
  
   
This list is accurate as of 4/30/18  9:35 AM.  Always use your most recent med list.  
  
  
  
  
 aspirin delayed-release 81 mg tablet Take  by mouth daily. calcium carbonate-vitamin D3 600 mg(1,500mg) -800 unit Tab Take  by mouth. donepezil 5 mg tablet Commonly known as:  ARICEPT Take 1 Tab by mouth nightly. For one month. If tolerated, increase to 10 mg daily after one month  
  
 metoprolol succinate 50 mg XL tablet Commonly known as:  TOPROL-XL Take  by mouth daily. NITROSTAT 0.4 mg SL tablet Generic drug:  nitroglycerin  
by SubLINGual route every five (5) minutes as needed for Chest Pain. Omeprazole delayed release 20 mg tablet Commonly known as:  PRILOSEC D/R Take 1 Tab by mouth daily. Indications: Heartburn  
  
 polyethylene glycol 17 gram/dose powder Commonly known as:  Davide [place] Take 17 g by mouth daily as needed. Indications: constipation  
  
 ramipril 5 mg capsule Commonly known as:  ALTACE Take  by mouth daily. REFRESH LIQUIGEL 1 % Dlgl Generic drug:  carboxymethylcellulose sodium Apply  to eye. travoprost 0.004 % ophthalmic solution Commonly known as:  TRAVATAN Z Administer 1 Drop to both eyes every evening. VITAMIN D3 1,000 unit tablet Generic drug:  cholecalciferol Take  by mouth daily. Patient Instructions If you have any questions or concerns about today's appointment, the verbal and/or written instructions you were given for follow up care, please call our office at [phone]. [facility] [place] [phone] office [phone] Introducing [facility]! [facility] introduces [employer] patient portal. Now you can access parts of your medical record, email your doctor's office, and request medication refills online. 1. In your internet browser, go to [email or web address] 2. Click on the First Time User? Click Here link in the Sign In box. You will see the New Member Sign Up page. 3. Enter your [employer] Access Code exactly as it appears below. You will not need to use this code after youve completed the sign-up process. If you do not sign up before the expiration date, you must request a new code. · [employer] Access Code: [email or web address] Expires: 5/15/2018  3:19 PM 
 
4. Enter the last four digits of your Social Security Number (xxxx) and Date of Birth (mm/dd/yyyy) as indicated and click Submit. You will be taken to the next sign-up page. 5. Create a [employer] ID. This will be your [employer] login ID and cannot be changed, so think of one that is secure and easy to remember. 6. Create a [employer] password. You can change your password at any time. 7. Enter your Password Reset Question and Answer. This can be used at a later time if you forget your password. 8. Enter your e-mail address. You will receive e-mail notification when new information is available in [facility] 9. Click Sign Up. You can now view and download portions of your medical record. 10. Click the Download Summary menu link to download a portable copy of your medical information. If you have questions, please visit the Frequently Asked Questions section of the [employer] website. Remember, [employer] is NOT to be used for urgent needs. For medical emergencies, dial 911. Now available from your iPhone and Android! Please provide this summary of care documentation to your next provider. Your primary care clinician is listed as [address] [place] [address] Ave. If you have any questions after today's visit, please call [phone].

## 2018-04-30 NOTE — PROGRESS NOTES
Patient seen and examined. She is doing great. No abdominal pain. Tolerating a regular diet. Abdomen soft and non tender. Wounds are healing well. Follow up as needed.

## 2018-04-30 NOTE — PATIENT INSTRUCTIONS
If you have any questions or concerns about today's appointment, the verbal and/or written instructions you were given for follow up care, please call our office at [phone].     [facility] - [address]    [phone] office  [phone]

## 2018-04-30 NOTE — PROGRESS NOTES
Hector Driscoll is a 80 y.o. female who presents today for a post-op exam for a robotic repair of a LIH w/ placement of mesh performed on 04/17/18. Patient scores their post-op pain level on a pain scale from 1-10 as a 0 today. 1. Have you been to the ER, urgent care clinic since your last visit? Hospitalized since your last visit? No    2. Have you seen or consulted any other health care providers outside of the [address] [place] [address] since your last visit? Include any pap smears or colon screening.  No

## 2018-06-19 ENCOUNTER — HOSPITAL ENCOUNTER (EMERGENCY)
Age: 83
Discharge: HOME | End: 2018-06-19
Attending: EMERGENCY MEDICINE
Payer: MEDICARE

## 2018-06-19 ENCOUNTER — APPOINTMENT (OUTPATIENT)
Dept: CT IMAGING | Age: 83
End: 2018-06-19
Attending: EMERGENCY MEDICINE
Payer: MEDICARE

## 2018-06-19 VITALS
HEIGHT: 61 IN | WEIGHT: 110.8 LBS | OXYGEN SATURATION: 98 % | SYSTOLIC BLOOD PRESSURE: 138 MMHG | RESPIRATION RATE: 16 BRPM | HEART RATE: 68 BPM | DIASTOLIC BLOOD PRESSURE: 45 MMHG | BODY MASS INDEX: 20.92 KG/M2 | TEMPERATURE: 98.1 F

## 2018-06-19 DIAGNOSIS — G30.1 LATE ONSET ALZHEIMER'S DISEASE WITHOUT BEHAVIORAL DISTURBANCE (HCC): ICD-10-CM

## 2018-06-19 DIAGNOSIS — R29.6 UNWITNESSED FALL: Primary | ICD-10-CM

## 2018-06-19 DIAGNOSIS — S51.012A SKIN TEAR OF LEFT ELBOW WITHOUT COMPLICATION, INITIAL ENCOUNTER: ICD-10-CM

## 2018-06-19 DIAGNOSIS — N30.00 ACUTE CYSTITIS WITHOUT HEMATURIA: ICD-10-CM

## 2018-06-19 DIAGNOSIS — F02.80 LATE ONSET ALZHEIMER'S DISEASE WITHOUT BEHAVIORAL DISTURBANCE (HCC): ICD-10-CM

## 2018-06-19 DIAGNOSIS — S22.060A TRAUMATIC COMPRESSION FRACTURE OF T7 THORACIC VERTEBRA, CLOSED, INITIAL ENCOUNTER (HCC): ICD-10-CM

## 2018-06-19 LAB
APPEARANCE UR: CLEAR
BACTERIA URNS QL MICRO: ABNORMAL /HPF
BILIRUB UR QL: NEGATIVE
COLOR UR: YELLOW
EPITH CASTS URNS QL MICRO: ABNORMAL /LPF (ref 0–5)
GLUCOSE UR STRIP.AUTO-MCNC: NEGATIVE MG/DL
HGB UR QL STRIP: NEGATIVE
KETONES UR QL STRIP.AUTO: NEGATIVE MG/DL
LEUKOCYTE ESTERASE UR QL STRIP.AUTO: ABNORMAL
NITRITE UR QL STRIP.AUTO: POSITIVE
PH UR STRIP: 7 [PH] (ref 5–8)
PROT UR STRIP-MCNC: NEGATIVE MG/DL
RBC #/AREA URNS HPF: NEGATIVE /HPF (ref 0–5)
SP GR UR REFRACTOMETRY: 1.01 (ref 1–1.03)
UROBILINOGEN UR QL STRIP.AUTO: 0.2 EU/DL (ref 0.2–1)
WBC URNS QL MICRO: ABNORMAL /HPF (ref 0–4)

## 2018-06-19 PROCEDURE — 81001 URINALYSIS AUTO W/SCOPE: CPT

## 2018-06-19 PROCEDURE — 77030037878 HC DRSG MEPILEX >48IN BORD MOLN -B

## 2018-06-19 PROCEDURE — 72128 CT CHEST SPINE W/O DYE: CPT

## 2018-06-19 PROCEDURE — 99285 EMERGENCY DEPT VISIT HI MDM: CPT

## 2018-06-19 PROCEDURE — 74011250637 HC RX REV CODE- 250/637: Performed by: EMERGENCY MEDICINE

## 2018-06-19 PROCEDURE — 74011000250 HC RX REV CODE- 250: Performed by: EMERGENCY MEDICINE

## 2018-06-19 RX ORDER — BACITRACIN 500 UNIT/G
1 PACKET (EA) TOPICAL
Status: COMPLETED | OUTPATIENT
Start: 2018-06-19 | End: 2018-06-19

## 2018-06-19 RX ORDER — CEPHALEXIN 250 MG/1
500 CAPSULE ORAL
Status: DISCONTINUED | OUTPATIENT
Start: 2018-06-19 | End: 2018-06-19 | Stop reason: HOSPADM

## 2018-06-19 RX ORDER — CEPHALEXIN 500 MG/1
500 CAPSULE ORAL 4 TIMES DAILY
Qty: 28 CAP | Refills: 0 | Status: SHIPPED | OUTPATIENT
Start: 2018-06-19 | End: 2018-06-21

## 2018-06-19 RX ORDER — ACETAMINOPHEN 325 MG/1
650 TABLET ORAL
Status: COMPLETED | OUTPATIENT
Start: 2018-06-19 | End: 2018-06-19

## 2018-06-19 RX ORDER — BACITRACIN 500 UNIT/G
1 PACKET (EA) TOPICAL 3 TIMES DAILY
Status: DISCONTINUED | OUTPATIENT
Start: 2018-06-19 | End: 2018-06-19

## 2018-06-19 RX ORDER — HYDROCODONE BITARTRATE AND ACETAMINOPHEN 5; 325 MG/1; MG/1
1 TABLET ORAL
Qty: 12 TAB | Refills: 0 | Status: SHIPPED | OUTPATIENT
Start: 2018-06-19 | End: 2018-06-21

## 2018-06-19 RX ADMIN — BACITRACIN 1 PACKET: 500 OINTMENT TOPICAL at 10:30

## 2018-06-19 RX ADMIN — ACETAMINOPHEN 650 MG: 325 TABLET ORAL at 10:25

## 2018-06-19 NOTE — ED PROVIDER NOTES
EMERGENCY DEPARTMENT HISTORY AND PHYSICAL EXAM    9:25 AM      Date: 6/19/2018  Patient Name: Riaz Calhoun    History of Presenting Illness     No chief complaint on file. History Provided By: Patient    Chief Complaint: Back pain  Duration:  Minutes  Timing:  Constant  Location: Middle  Quality: Aching  Severity: Mild  Modifying Factors: None  Associated Symptoms: None       Additional History (Context): Riaz Calhoun is a 80 y.o. female with a h/o CAD, HTN, dementia, and a h/o hip fracture who presents to the ED via EMS from [facility] for evaluation of a presumed fall that occurred just PTA with c/o mild constant achy middle back pain. NH staff noted skin tear to the patients left arm. When staff asked the patient what happened she could not provide a clear story. Here in the ED the patient states that she does not remember what happened. She remarks that she ambulates with a walker. She also notes intermittent dysuria. The patient denies hip pain. Patient is oriented to self, place, and time. No other complaints or concerns at this time. Per her son, the patient is a better historian of event that occurred 20 years ago. She has baseline difficulty recalling recent events and currently appears at her baseline. PCP: Chava Mitchell MD    Current Facility-Administered Medications   Medication Dose Route Frequency Provider Last Rate Last Dose    cephALEXin (KEFLEX) capsule 500 mg  500 mg Oral NOW Solo Pelletier MD         Current Outpatient Prescriptions   Medication Sig Dispense Refill    cephALEXin (KEFLEX) 500 mg capsule Take 1 Cap by mouth four (4) times daily for 7 days. 28 Cap 0    HYDROcodone-acetaminophen (NORCO) 5-325 mg per tablet Take 1 Tab by mouth every six (6) hours as needed. Max Daily Amount: 4 Tabs. Take 1-2 tablets PO every 4-6 hours as needed for pain control.   If over the counter ibuprofen or acetaminophen was suggested, then only take the vicodin for pain not well controlled with the over the counter medication. 12 Tab 0    Omeprazole delayed release (PRILOSEC D/R) 20 mg tablet Take 1 Tab by mouth daily. Indications: Heartburn 90 Tab 3    polyethylene glycol (MIRALAX) 17 gram/dose powder Take 17 g by mouth daily as needed. Indications: constipation 238 g 1    donepezil (ARICEPT) 5 mg tablet Take 1 Tab by mouth nightly. For one month. If tolerated, increase to 10 mg daily after one month 30 Tab 2    cholecalciferol (VITAMIN D3) 1,000 unit tablet Take  by mouth daily.  metoprolol succinate (TOPROL-XL) 50 mg XL tablet Take  by mouth daily.  aspirin delayed-release 81 mg tablet Take  by mouth daily.  calcium carbonate-vitamin D3 600 mg(1,500mg) -800 unit tab Take  by mouth.  travoprost (TRAVATAN Z) 0.004 % ophthalmic solution Administer 1 Drop to both eyes every evening.  ramipril (ALTACE) 5 mg capsule Take  by mouth daily.  carboxymethylcellulose sodium (REFRESH LIQUIGEL) 1 % dlgl Apply  to eye.  nitroglycerin (NITROSTAT) 0.4 mg SL tablet by SubLINGual route every five (5) minutes as needed for Chest Pain.          Past History     Past Medical History:  Past Medical History:   Diagnosis Date    Aortic heart valve narrowing     Chronic kidney disease     Femoral hernia     GERD (gastroesophageal reflux disease)     Glaucoma     [facility] fracture[facility] ([facility] [address] 2013    Hypercholesterolemia     Hypertension     Lump in the abdomen     Macular degeneration     Myocardial infarct, old     Pulmonary HTN ([facility] [address]     Unstable chest pain due to insufficient blood supply to heart [facility])        Past Surgical History:  Past Surgical History:   Procedure Laterality Date    CABG, ARTERY-VEIN, TWO  1993    HIP ARTHROSCOPY, DX  2013    FX pinning    HX HERNIA REPAIR  04/17/2018    Robotic repair of a LIH w/ placement of mesh       Family History:  Family History   Problem Relation Age of Onset    Heart Attack Mother     Heart Attack Father        Social History:  Social History   Substance Use Topics    Smoking status: Former Smoker     Quit date: 4/9/1976    Smokeless tobacco: Never Used      Comment: approx 30 yrs ago    Alcohol use No      Comment: occasionally       Allergies: Allergies   Allergen Reactions    Alendronate Unknown (comments)         Review of Systems       Review of Systems   Constitutional: Negative for chills. HENT: Negative for congestion and sore throat. Respiratory: Negative for cough and shortness of breath. Cardiovascular: Negative for chest pain. Gastrointestinal: Negative for abdominal pain, diarrhea, nausea and vomiting. Genitourinary: Positive for dysuria. Musculoskeletal: Positive for back pain. Skin: Negative for rash. Neurological: Negative for dizziness and headaches. All other systems reviewed and are negative. Physical Exam     Visit Vitals    /44    Temp 97.9 °F (36.6 °C)    Ht 5' 1\" (1.549 m)    Wt 50.3 kg (110 lb 12.8 oz)    SpO2 97%    BMI 20.94 kg/m2       Physical Exam  General Exam: Patient is well developed and well nourished in no distress. Patient does not appear acutely ill or toxic. Patient is elderly and frail appearing. Eye Exam: Lids and conjunctiva are normal  ENT Exam: The general head and facial exam is normal.  The neck is supple without meningeal signs. No significant adenopathy. Pulmonary Exam: No respiratory distress. The respiratory rate is normal.  No stridor. The breath sounds are equal bilaterally. There are no wheezes, rales, or rhonchi noted. Cardiac Exam: The cardiac rate and rhythm are normal.  No significant murmurs, rubs, or gallops. The peripheral pulses are normal.  Abdominal Exam: Abdomen is soft and non-distended. No pulsatile masses. There is no local tenderness. There is no rebound or guarding noted. Skin and Soft Tissue: The skin is warm and dry, without significant abnormality. Good color.  2 inch skin tear overlying distal left humorous. No bony tenderness. No pain or ROM of elbow or shoulder. Musculoskeletal Exam: There is no clubbing or cyanosis. There is no peripheral edema. The musculoskeletal exam of the lower extremities is normal without significant local tenderness or spasm. Mild thoracic spinal tenderness. No step off, no deformity, no redness, no swelling, or kyphosis. No pelvic instability. No difficulty with b/l hip ROM. No CVA tenderness. No chest wall tenderness or crepitus. Neurologic: Pt is alert and appropriate. Normal speech. Normal symmetric muscle strength and tone in all extremities. Normal gait. Psychiatric: Normal adult with appropriate demeanor and interpersonal interaction. Is oriented to person, place, and time. Diagnostic Study Results     Labs -  Recent Results (from the past 12 hour(s))   URINALYSIS W/ RFLX MICROSCOPIC    Collection Time: 06/19/18  9:24 AM   Result Value Ref Range    Color YELLOW      Appearance CLEAR      Specific gravity 1.015 1.005 - 1.030      pH (UA) 7.0 5.0 - 8.0      Protein NEGATIVE  NEG mg/dL    Glucose NEGATIVE  NEG mg/dL    Ketone NEGATIVE  NEG mg/dL    Bilirubin NEGATIVE  NEG      Blood NEGATIVE  NEG      Urobilinogen 0.2 0.2 - 1.0 EU/dL    Nitrites POSITIVE (A) NEG      Leukocyte Esterase SMALL (A) NEG     URINE MICROSCOPIC ONLY    Collection Time: 06/19/18  9:24 AM   Result Value Ref Range    WBC 4 to 6 0 - 4 /hpf    RBC NEGATIVE  0 - 5 /hpf    Epithelial cells 3+ 0 - 5 /lpf    Bacteria 2+ (A) NEG /hpf       Radiologic Studies -   CT SPINE [facility] WO CONT   Final Result   IMPRESSION:     1. Acute/subacute T7 compression fracture with moderate loss of height. No  definite retropulsion or central stenosis is seen.     2. No definite additional thoracic compression fractures identified.     3. Probable mild L1 superior endplate compression fracture, suspect chronic  although strictly age indeterminate without prior comparison study.   localizer images suggest additional L2 and L4 compression fractures, also age  indeterminate. Does patient have lower back pain as well?     4. No high-grade central or foraminal stenosis.     5. If this T7 fracture is producing significant pain and is not adequately  controlled with medication, this fracture may be amenable to vertebral  augmentation (vertebroplasty/kyphoplasty). MR imaging would be recommended  during the preprocedure assessment process to exclude additional recent  fractures. Radiology consultation is available and our vertebral augmentation  coordinator Regla Ramirez may be contacted at [phone]. Medical Decision Making   I am the first provider for this patient. I reviewed the vital signs, available nursing notes, past medical history, past surgical history, family history and social history. Vital Signs-Reviewed the patient's vital signs. Pulse Oximetry Analysis -  97% on room air (Interpretation) WNL    Records Reviewed: Old Medical Records (Time of Review: 9:25 AM)    ED Course: Progress Notes, Reevaluation, and Consults:    Provider Notes (Medical Decision Making): Pt with h/o dementia with unwitnessed fall at [facility]. Staff noted abrasion to arm. No other signs of trauma on pt. Other than point tenderness to mid thoracic spine, no other pain noted on exam. Per son, pt at neuro baseline. Ct with T7 fracture. Pain controlled in ED. No signs of cord compression, epidural hematoma based on exam. Pt also with UTI. Pt and son aware of treatment plan, need for follow up, and strict return precautions. Pt lives at [facility] and they are comfortable with plan for discharge. 11:23 AM The patient and son are aware of the patients fracture at T7. I discussed pain control as well as PCP and ortho follow up. They are aware that the patients fracture may require surgery if pain is not controlled over the next several day. Diagnosis     Clinical Impression:   1. Unwitnessed fall    2. Late onset Alzheimer's disease without behavioral disturbance    3. Skin tear of left elbow without complication, initial encounter    4. Acute cystitis without hematuria    5. Traumatic compression fracture of T7 thoracic vertebra, closed, initial encounter [facility])        Disposition: Discharged     Follow-up Information     Follow up With Details Comments Ean Turcios MD In 1 week  [place] [address] [address]      Komal Dalton MD Schedule an appointment as soon as possible for a visit  [address] [address]  [phone]             Patient's Medications   Start Taking    CEPHALEXIN (KEFLEX) 500 MG CAPSULE    Take 1 Cap by mouth four (4) times daily for 7 days. HYDROCODONE-ACETAMINOPHEN (NORCO) 5-325 MG PER TABLET    Take 1 Tab by mouth every six (6) hours as needed. Max Daily Amount: 4 Tabs. Take 1-2 tablets PO every 4-6 hours as needed for pain control. If over the counter ibuprofen or acetaminophen was suggested, then only take the vicodin for pain not well controlled with the over the counter medication. Continue Taking    ASPIRIN DELAYED-RELEASE 81 MG TABLET    Take  by mouth daily. CALCIUM CARBONATE-VITAMIN D3 600 MG(1,500MG) -800 UNIT TAB    Take  by mouth. CARBOXYMETHYLCELLULOSE SODIUM (REFRESH LIQUIGEL) 1 % DLGL    Apply  to eye. CHOLECALCIFEROL (VITAMIN D3) 1,000 UNIT TABLET    Take  by mouth daily. DONEPEZIL (ARICEPT) 5 MG TABLET    Take 1 Tab by mouth nightly. For one month. If tolerated, increase to 10 mg daily after one month    METOPROLOL SUCCINATE (TOPROL-XL) 50 MG XL TABLET    Take  by mouth daily. NITROGLYCERIN (NITROSTAT) 0.4 MG SL TABLET    by SubLINGual route every five (5) minutes as needed for Chest Pain. OMEPRAZOLE DELAYED RELEASE (PRILOSEC D/R) 20 MG TABLET    Take 1 Tab by mouth daily.  Indications: Heartburn    POLYETHYLENE GLYCOL (MIRALAX) 17 GRAM/DOSE POWDER    Take 17 g by mouth daily as needed. Indications: constipation    RAMIPRIL (ALTACE) 5 MG CAPSULE    Take  by mouth daily. TRAVOPROST (TRAVATAN Z) 0.004 % OPHTHALMIC SOLUTION    Administer 1 Drop to both eyes every evening. These Medications have changed    No medications on file   Stop Taking    No medications on file     _______________________________    Attestations:  [employer] acting as a scribe for and in the presence of Krysta Jimenez MD      June 19, 2018 at 9:25 AM       Provider Attestation:      I personally performed the services described in the documentation, reviewed the documentation, as recorded by the scribe in my presence, and it accurately and completely records my words and actions.  June 19, 2018 at 9:25 AM - Krysta Jimenez MD    _______________________________

## 2018-06-19 NOTE — DISCHARGE INSTRUCTIONS
Learning About How to Have a Healthy Back  What causes back pain? Back pain is often caused by overuse, strain, or injury. For example, people often hurt their backs playing sports or working in the yard, being jolted in a car accident, or lifting something too heavy. Aging plays a part too. Your bones and muscles tend to lose strength as you age, which makes injury more likely. The spongy discs between the bones of the spine (vertebrae) may suffer from wear and tear and no longer provide enough cushion between the bones. A disc that bulges or breaks open (herniated disc) can press on nerves, causing back pain. In some people, back pain is the result of arthritis, broken vertebrae caused by bone loss (osteoporosis), illness, or a spine problem. Although most people have back pain at one time or another, there are steps you can take to make it less likely. How can you have a healthy back? Reduce stress on your back through good posture  Slumping or slouching alone may not cause low back pain. But after the back has been strained or injured, bad posture can make pain worse. · Sleep in a position that maintains your back's normal curves and on a mattress that feels comfortable. Sleep on your side with a pillow between your knees, or sleep on your back with a pillow under your knees. These positions can reduce strain on your back. · Stand and sit up straight. \"Good posture\" generally means your ears, shoulders, and hips are in a straight line. · If you must stand for a long time, put one foot on a stool, ledge, or box. Switch feet every now and then. · Sit in a chair that is low enough to let you place both feet flat on the floor with both knees nearly level with your hips. If your chair or desk is too high, use a footrest to raise your knees. Place a small pillow, a rolled-up towel, or a lumbar roll in the curve of your back if you need extra support.   · Try a kneeling chair, which helps tilt your hips forward. This takes pressure off your lower back. · Try sitting on an exercise ball. It can rock from side to side, which helps keep your back loose. · When driving, keep your knees nearly level with your hips. Sit straight, and drive with both hands on the steering wheel. Your arms should be in a slightly bent position. Reduce stress on your back through careful lifting  · Squat down, bending at the hips and knees only. If you need to, put one knee to the floor and extend your other knee in front of you, bent at a right angle (half kneeling). · Press your chest straight forward. This helps keep your upper back straight while keeping a slight arch in your low back. · Hold the load as close to your body as possible, at the level of your belly button (navel). · Use your feet to change direction, taking small steps. · Lead with your hips as you change direction. Keep your shoulders in line with your hips as you move. · Set down your load carefully, squatting with your knees and hips only. Exercise and stretch your back  · Do some exercise on most days of the week, if your doctor says it is okay. You can walk, run, swim, or cycle. · Stretch your back muscles. Here are a few exercises to try:  Melania Haven on your back, and gently pull one bent knee to your chest. Put that foot back on the floor, and then pull the other knee to your chest.  ¨ Do pelvic tilts. Lie on your back with your knees bent. Tighten your stomach muscles. Pull your belly button (navel) in and up toward your ribs. You should feel like your back is pressing to the floor and your hips and pelvis are slightly lifting off the floor. Hold for 6 seconds while breathing smoothly. ¨ Sit with your back flat against a wall. · Keep your core muscles strong. The muscles of your back, belly (abdomen), and buttocks support your spine. ¨ Pull in your belly and imagine pulling your navel toward your spine. Hold this for 6 seconds, then relax.  Remember to keep breathing normally as you tense your muscles. ¨ Do curl-ups. Always do them with your knees bent. Keep your low back on the floor, and curl your shoulders toward your knees using a smooth, slow motion. Keep your arms folded across your chest. If this bothers your neck, try putting your hands behind your neck (not your head), with your elbows spread apart. ¨ Lie on your back with your knees bent and your feet flat on the floor. Tighten your belly muscles, and then push with your feet and raise your buttocks up a few inches. Hold this position 6 seconds as you continue to breathe normally, then lower yourself slowly to the floor. Repeat 8 to 12 times. ¨ If you like group exercise, try Pilates or yoga. These classes have poses that strengthen the core muscles. Lead a healthy lifestyle  · Stay at a healthy weight to avoid strain on your back. · Do not smoke. Smoking increases the risk of osteoporosis, which weakens the spine. If you need help quitting, talk to your doctor about stop-smoking programs and medicines. These can increase your chances of quitting for good. Where can you learn more? Go to http://anneliese-natalio.info/. Enter L315 in the search box to learn more about \"Learning About How to Have a Healthy Back. \"  Current as of: March 21, 2017  Content Version: 11.4  © 8091-7817 Healthwise, [employer]. Care instructions adapted under license by [employer] (which disclaims liability or warranty for this information). If you have questions about a medical condition or this instruction, always ask your healthcare professional. [place] [address] any warranty or liability for your use of this information.

## 2018-06-19 NOTE — ED NOTES
I have reviewed discharge instructions with the patient and caregiver. The patient and caregiver verbalized understanding. Discharge medications reviewed with patient and appropriate educational materials and side effects teaching were provided. Patient instructed to begin antibiotics and continue until complete.

## 2018-06-21 ENCOUNTER — HOSPITAL ENCOUNTER (OUTPATIENT)
Dept: LAB | Age: 83
Discharge: HOME | End: 2018-06-21
Payer: MEDICARE

## 2018-06-21 ENCOUNTER — OFFICE VISIT (OUTPATIENT)
Dept: INTERNAL MEDICINE CLINIC | Age: 83
End: 2018-06-21

## 2018-06-21 VITALS
SYSTOLIC BLOOD PRESSURE: 174 MMHG | HEART RATE: 86 BPM | WEIGHT: 88 LBS | DIASTOLIC BLOOD PRESSURE: 86 MMHG | TEMPERATURE: 98.5 F | HEIGHT: 61 IN | RESPIRATION RATE: 20 BRPM | OXYGEN SATURATION: 99 % | BODY MASS INDEX: 16.62 KG/M2

## 2018-06-21 DIAGNOSIS — S22.000A COMPRESSION FRACTURE OF BODY OF THORACIC VERTEBRA (HCC): Primary | ICD-10-CM

## 2018-06-21 DIAGNOSIS — N30.00 ACUTE CYSTITIS WITHOUT HEMATURIA: ICD-10-CM

## 2018-06-21 DIAGNOSIS — R11.2 NON-INTRACTABLE VOMITING WITH NAUSEA, UNSPECIFIED VOMITING TYPE: ICD-10-CM

## 2018-06-21 LAB
ALBUMIN SERPL-MCNC: 4 G/DL (ref 3.4–5)
ALBUMIN/GLOB SERPL: 1.1 {RATIO} (ref 0.8–1.7)
ALP SERPL-CCNC: 90 U/L (ref 45–117)
ALT SERPL-CCNC: 16 U/L (ref 13–56)
ANION GAP SERPL CALC-SCNC: 11 MMOL/L (ref 3–18)
AST SERPL-CCNC: 20 U/L (ref 15–37)
BASOPHILS # BLD: 0 K/UL (ref 0–0.06)
BASOPHILS NFR BLD: 0 % (ref 0–2)
BILIRUB SERPL-MCNC: 0.6 MG/DL (ref 0.2–1)
BUN SERPL-MCNC: 51 MG/DL (ref 7–18)
BUN/CREAT SERPL: 43 (ref 12–20)
CALCIUM SERPL-MCNC: 9.8 MG/DL (ref 8.5–10.1)
CHLORIDE SERPL-SCNC: 95 MMOL/L (ref 100–108)
CO2 SERPL-SCNC: 27 MMOL/L (ref 21–32)
CREAT SERPL-MCNC: 1.19 MG/DL (ref 0.6–1.3)
DIFFERENTIAL METHOD BLD: ABNORMAL
EOSINOPHIL # BLD: 0 K/UL (ref 0–0.4)
EOSINOPHIL NFR BLD: 0 % (ref 0–5)
ERYTHROCYTE [DISTWIDTH] IN BLOOD BY AUTOMATED COUNT: 14.3 % (ref 11.6–14.5)
GLOBULIN SER CALC-MCNC: 3.5 G/DL (ref 2–4)
GLUCOSE SERPL-MCNC: 135 MG/DL (ref 74–99)
HCT VFR BLD AUTO: 39 % (ref 35–45)
HGB BLD-MCNC: 12.9 G/DL (ref 12–16)
LYMPHOCYTES # BLD: 0.7 K/UL (ref 0.9–3.6)
LYMPHOCYTES NFR BLD: 4 % (ref 21–52)
MCH RBC QN AUTO: 29.5 PG (ref 24–34)
MCHC RBC AUTO-ENTMCNC: 33.1 G/DL (ref 31–37)
MCV RBC AUTO: 89 FL (ref 74–97)
MONOCYTES # BLD: 1.2 K/UL (ref 0.05–1.2)
MONOCYTES NFR BLD: 8 % (ref 3–10)
NEUTS SEG # BLD: 13.7 K/UL (ref 1.8–8)
NEUTS SEG NFR BLD: 88 % (ref 40–73)
PLATELET # BLD AUTO: 326 K/UL (ref 135–420)
PMV BLD AUTO: 10.9 FL (ref 9.2–11.8)
POTASSIUM SERPL-SCNC: 4.3 MMOL/L (ref 3.5–5.5)
PROT SERPL-MCNC: 7.5 G/DL (ref 6.4–8.2)
RBC # BLD AUTO: 4.38 M/UL (ref 4.2–5.3)
SODIUM SERPL-SCNC: 133 MMOL/L (ref 136–145)
WBC # BLD AUTO: 15.6 K/UL (ref 4.6–13.2)

## 2018-06-21 PROCEDURE — 85025 COMPLETE CBC W/AUTO DIFF WBC: CPT | Performed by: INTERNAL MEDICINE

## 2018-06-21 PROCEDURE — 36415 COLL VENOUS BLD VENIPUNCTURE: CPT | Performed by: INTERNAL MEDICINE

## 2018-06-21 PROCEDURE — 80053 COMPREHEN METABOLIC PANEL: CPT | Performed by: INTERNAL MEDICINE

## 2018-06-21 RX ORDER — ONDANSETRON 4 MG/1
4 TABLET, ORALLY DISINTEGRATING ORAL
Qty: 30 TAB | Refills: 0 | Status: SHIPPED | OUTPATIENT
Start: 2018-06-21

## 2018-06-21 RX ORDER — CIPROFLOXACIN 250 MG/1
250 TABLET, FILM COATED ORAL 2 TIMES DAILY
Qty: 6 TAB | Refills: 0 | Status: SHIPPED | OUTPATIENT
Start: 2018-06-21 | End: 2018-06-24

## 2018-06-21 RX ORDER — HYDROCODONE BITARTRATE AND ACETAMINOPHEN 5; 325 MG/1; MG/1
1 TABLET ORAL
Qty: 12 TAB | Refills: 0 | Status: SHIPPED | OUTPATIENT
Start: 2018-06-21 | End: 2018-01-01

## 2018-06-21 NOTE — MR AVS SNAPSHOT
Estuardo Sarabia 
 
 
 [address] [id number] 
[phone] Patient: Guicho Reyez MRN: [id number] Visit Information Date & Time Provider Department Dept. Phone Encounter #  
 6/21/2018  1:15 PM [phone] [id number] Follow-up Instructions Return if symptoms worsen or fail to improve. Your Appointments 7/17/2018  9:30 AM  
Office Visit with MD LEONEL [facility]) Appt Note: FOLLOW-UP  
 [address]  
  
   
 [address] [facility] Upcoming Health Maintenance Date Due DTaP/Tdap/Td series (1 - Tdap) 1/10/1949 ZOSTER VACCINE AGE 60> 11/10/1987 GLAUCOMA SCREENING Q2Y 1/10/1993 Bone Densitometry (Dexa) Screening 1/10/1993 Influenza Age 5 to Adult 8/1/2018 MEDICARE YEARLY EXAM 11/8/2018 Allergies as of 6/21/2018  Review Complete On: 6/21/2018 By: Marine Aguirre Severity Noted Reaction Type Reactions Alendronate  07/27/2008    Unknown (comments) Current Immunizations  Reviewed on 3/9/2018 Name Date Influenza High Dose Vaccine PF 11/1/2017 Influenza Vaccine 10/2/2017, 10/18/2012 12:00 AM  
 Pneumococcal Conjugate (PCV-13) 5/28/2015 Pneumococcal Polysaccharide (PPSV-23) 10/18/2012 12:00 AM, 10/29/2007 TB Skin Test (PPD) Intradermal 8/4/2017 Not reviewed this visit You Were Diagnosed With   
  
 Codes Comments Compression fracture of body of thoracic vertebra (HCC)    -  Primary ICD-10-CM: V76.46VR ICD-9-CM: 733.13 Acute cystitis without hematuria     ICD-10-CM: N30.00 ICD-9-CM: 595.0 Non-intractable vomiting with nausea, unspecified vomiting type     ICD-10-CM: R11.2 ICD-9-CM: 787.01 Vitals BP Pulse Temp Resp Height(growth percentile) Weight(growth percentile) 174/86 (BP 1 Location: Right arm, BP Patient Position: Sitting) 86 98.5 °F (36.9 °C) (Oral) 20 5' 1\" (1.549 m) 88 lb (39.9 kg) SpO2 BMI OB Status Smoking Status 99% 16.63 kg/m2 Postmenopausal Former Smoker BMI and BSA Data Body Mass Index Body Surface Area  
 16.63 kg/m 2 1.31 m 2 Your Updated Medication List  
  
   
This list is accurate as of 6/21/18  2:08 PM.  Always use your most recent med list.  
  
  
  
  
 aspirin delayed-release 81 mg tablet Take  by mouth daily. calcium carbonate-vitamin D3 600 mg(1,500mg) -800 unit Tab Take  by mouth. ciprofloxacin HCl 250 mg tablet Commonly known as:  CIPRO Take 1 Tab by mouth two (2) times a day for 3 days. donepezil 5 mg tablet Commonly known as:  ARICEPT Take 1 Tab by mouth nightly. For one month. If tolerated, increase to 10 mg daily after one month HYDROcodone-acetaminophen 5-325 mg per tablet Commonly known as:  [place] Feliciano Take 1 Tab by mouth every six (6) hours as needed for Pain. Max Daily Amount: 4 Tabs. metoprolol succinate 50 mg XL tablet Commonly known as:  TOPROL-XL Take  by mouth daily. NITROSTAT 0.4 mg SL tablet Generic drug:  nitroglycerin  
by SubLINGual route every five (5) minutes as needed for Chest Pain. Omeprazole delayed release 20 mg tablet Commonly known as:  PRILOSEC D/R Take 1 Tab by mouth daily. Indications: Heartburn  
  
 ondansetron 4 mg disintegrating tablet Commonly known as:  ZOFRAN ODT Take 1 Tab by mouth every eight (8) hours as needed for Nausea. polyethylene glycol 17 gram/dose powder Commonly known as:  Cristiano Marcelino Take 17 g by mouth daily as needed. Indications: constipation  
  
 ramipril 5 mg capsule Commonly known as:  ALTACE Take  by mouth daily. REFRESH LIQUIGEL 1 % Dlgl Generic drug:  carboxymethylcellulose sodium Apply  to eye. travoprost 0.004 % ophthalmic solution Commonly known as:  TRAVATAN Z Administer 1 Drop to both eyes every evening. VITAMIN D3 1,000 unit tablet Generic drug:  cholecalciferol Take  by mouth daily. Prescriptions Printed Refills  
 ciprofloxacin HCl (CIPRO) 250 mg tablet 0 Sig: Take 1 Tab by mouth two (2) times a day for 3 days. Class: Print Route: Oral  
 ondansetron (ZOFRAN ODT) 4 mg disintegrating tablet 0 Sig: Take 1 Tab by mouth every eight (8) hours as needed for Nausea. Class: Print Route: Oral  
 HYDROcodone-acetaminophen (NORCO) 5-325 mg per tablet 0 Sig: Take 1 Tab by mouth every six (6) hours as needed for Pain. Max Daily Amount: 4 Tabs. Class: Print Route: Oral  
  
Follow-up Instructions Return if symptoms worsen or fail to improve. To-Do List   
 06/21/2018 Lab:  CBC WITH AUTOMATED DIFF   
  
 06/21/2018 Lab:  METABOLIC PANEL, COMPREHENSIVE Patient Instructions Compression Fracture of the Spine: Care Instructions Your Care Instructions A compression fracture happens when the front part of a spinal bone breaks and collapses. A fall or other accident can cause it. A minor injury or moving the wrong way can cause a break if you have thin or brittle bones (osteoporosis). These types of breaks will heal in 8 to 10 weeks. You will need rest and pain medicines. Your doctor may recommend physical therapy. Some doctors recommend that certain people with compression fractures wear braces. Your doctor also may treat thin or brittle bones. You may need surgery if you have lasting pain or if the bone presses on the spinal cord or nerves. You heal best when you take good care of yourself. Eat a variety of healthy foods, and don't smoke. Follow-up care is a key part of your treatment and safety.  Be sure to make and go to all appointments, and call your doctor if you are having problems. It's also a good idea to know your test results and keep a list of the medicines you take. How can you care for yourself at home? · Be safe with medicines. Read and follow all instructions on the label. ¨ If the doctor gave you a prescription medicine for pain, take it as prescribed. ¨ If you are not taking a prescription pain medicine, ask your doctor if you can take an over-the-counter medicine. · Talk to your doctor about how to make your bones stronger. You may need medicines or a change in what you eat. · Be active only as directed by your doctor. When should you call for help? Call 911 anytime you think you may need emergency care. For example, call if: 
? · You are unable to move an arm or a leg at all. ?Call your doctor now or seek immediate medical care if: 
? · You have new or worse symptoms in your arms, legs, belly, or buttocks. Symptoms may include: ¨ Numbness or tingling. ¨ Weakness. ¨ Pain. ? · You lose bladder or bowel control. ? · You have belly pain, bloating, vomiting, or nausea. ? Watch closely for changes in your health, and be sure to contact your doctor if: 
? · You do not get better as expected. Where can you learn more? Go to http://anneliese-natalio.info/. Enter P445 in the search box to learn more about \"Compression Fracture of the Spine: Care Instructions. \" Current as of: March 21, 2017 Content Version: 11.4 © 6595-3317 [employer]. Care instructions adapted under license by [employer] (which disclaims liability or warranty for this information). If you have questions about a medical condition or this instruction, always ask your healthcare professional. [place] [address] any warranty or liability for your use of this information. Nausea and Vomiting: Care Instructions Your Care Instructions When you are nauseated, you may feel weak and sweaty and notice a lot of saliva in your mouth. Nausea often leads to vomiting. Most of the time you do not need to worry about nausea and vomiting, but they can be signs of other illnesses. Two common causes of nausea and vomiting are stomach flu and food poisoning. Nausea and vomiting from viral stomach flu will usually start to improve within 24 hours. Nausea and vomiting from food poisoning may last from 12 to 48 hours. The doctor has checked you carefully, but problems can develop later. If you notice any problems or new symptoms, get medical treatment right away. Follow-up care is a key part of your treatment and safety. Be sure to make and go to all appointments, and call your doctor if you are having problems. It's also a good idea to know your test results and keep a list of the medicines you take. How can you care for yourself at home? · To prevent dehydration, drink plenty of fluids, enough so that your urine is light yellow or clear like water. Choose water and other caffeine-free clear liquids until you feel better. If you have kidney, heart, or liver disease and have to limit fluids, talk with your doctor before you increase the amount of fluids you drink. · Rest in bed until you feel better. · When you are able to eat, try clear soups, mild foods, and liquids until all symptoms are gone for 12 to 48 hours. Other good choices include dry toast, crackers, cooked cereal, and gelatin dessert, such as Jell-O. When should you call for help? Call 911 anytime you think you may need emergency care. For example, call if: 
? · You passed out (lost consciousness). ?Call your doctor now or seek immediate medical care if: 
? · You have symptoms of dehydration, such as: ¨ Dry eyes and a dry mouth. ¨ Passing only a little dark urine. ¨ Feeling thirstier than usual.  
? · You have new or worsening belly pain. ? · You have a new or higher fever. ? · You vomit blood or what looks like coffee grounds. ?Watch closely for changes in your health, and be sure to contact your doctor if: 
? · You have ongoing nausea and vomiting. ? · Your vomiting is getting worse. ? · Your vomiting lasts longer than 2 days. ? · You are not getting better as expected. Where can you learn more? Go to http://anneliese-natalio.info/. Enter 25 [id number] in the search box to learn more about \"Nausea and Vomiting: Care Instructions. \" Current as of: March 20, 2017 Content Version: 11.4 © 9420-4764 [employer]. Care instructions adapted under license by [employer] (which disclaims liability or warranty for this information). If you have questions about a medical condition or this instruction, always ask your healthcare professional. [address] any warranty or liability for your use of this information. Introducing [facility]! [employer] introduces [employer] patient portal. Now you can access parts of your medical record, email your doctor's office, and request medication refills online. 1. In your internet browser, go to [email or web address] 2. Click on the First Time User? Click Here link in the Sign In box. You will see the New Member Sign Up page. 3. Enter your Techn[employer] Access Code exactly as it appears below. You will not need to use this code after youve completed the sign-up process. If you do not sign up before the expiration date, you must request a new code. · Techn[employer] Access Code: [email or web address] Expires: 9/17/2018  9:03 AM 
 
4. Enter the last four digits of your Social Security Number (xxxx) and Date of Birth (mm/dd/yyyy) as indicated and click Submit. You will be taken to the next sign-up page. 5. Create a American Board of Addiction Medicine (ABAM)t ID. This will be your [employer] login ID and cannot be changed, so think of one that is secure and easy to remember. 6. Create a American Board of Addiction Medicine (ABAM)t password. You can change your password at any time. 7. Enter your Password Reset Question and Answer. This can be used at a later time if you forget your password. 8. Enter your e-mail address. You will receive e-mail notification when new information is available in [address] 9. Click Sign Up. You can now view and download portions of your medical record. 10. Click the Download Summary menu link to download a portable copy of your medical information. If you have questions, please visit the Frequently Asked Questions section of the [email or web address] website. Remember, [email or web address] is NOT to be used for urgent needs. For medical emergencies, dial 911. Now available from your iPhone and Android! Please provide this summary of care documentation to your next provider. Your primary care clinician is listed as Junaid Chambers. If you have any questions after today's visit, please call [phone].

## 2018-06-21 NOTE — PROGRESS NOTES
HISTORY OF PRESENT ILLNESS  Deborrcarol Martinez is a 80 y.o. female. HPI Comments: 81 yo female here for f/u from ED 6/19/18 after unwitnessed fall at her facility. She does not recall what happened. Son reports she had been in her usual state of health the day prior. Taken via EMS to ED where she was dx with UTI and found to have T7 compression fx. Has not been taking pain medication since return to her facility as they were not able to follow ranges give on prescription. She has had sig n/v on abx for UTI. Has not been able to eat while on this and difficulty maintaining hydration. Stopped abx and son reports she was able to keep fluids down today. Denies urinary sx. Hospital Follow Up   Pertinent negatives include no chest pain, no abdominal pain, no headaches and no shortness of breath. Referral Follow Up   Pertinent negatives include no chest pain, no abdominal pain, no headaches and no shortness of breath. Review of Systems   Constitutional: Negative for chills, fever and weight loss. HENT: Negative for congestion and ear pain. Eyes: Negative for blurred vision and pain. Respiratory: Negative for cough and shortness of breath. Cardiovascular: Negative for chest pain, palpitations and leg swelling. Gastrointestinal: Positive for diarrhea, nausea and vomiting. Negative for abdominal pain. Genitourinary: Negative for frequency and urgency. Musculoskeletal: Positive for back pain and falls. Negative for joint pain and myalgias. Skin: Negative for itching and rash. Neurological: Negative for tingling and headaches. Psychiatric/Behavioral: Negative for depression. The patient is not nervous/anxious.       Past Medical History:   Diagnosis Date    Aortic heart valve narrowing     Chronic kidney disease     Femoral hernia     GERD (gastroesophageal reflux disease)     Glaucoma     Hip fracture[facility] left ([facility] [address] 2013    Hypercholesterolemia     Hypertension     Lump in the abdomen     Macular degeneration     Myocardial infarct, old     Pulmonary HTN ([address]     Unstable chest pain due to insufficient blood supply to heart [facility])      Current Outpatient Prescriptions on File Prior to Visit   Medication Sig Dispense Refill    Omeprazole delayed release (PRILOSEC D/R) 20 mg tablet Take 1 Tab by mouth daily. Indications: Heartburn 90 Tab 3    polyethylene glycol (MIRALAX) 17 gram/dose powder Take 17 g by mouth daily as needed. Indications: constipation 238 g 1    donepezil (ARICEPT) 5 mg tablet Take 1 Tab by mouth nightly. For one month. If tolerated, increase to 10 mg daily after one month 30 Tab 2    cholecalciferol (VITAMIN D3) 1,000 unit tablet Take  by mouth daily.  metoprolol succinate (TOPROL-XL) 50 mg XL tablet Take  by mouth daily.  aspirin delayed-release 81 mg tablet Take  by mouth daily.  calcium carbonate-vitamin D3 600 mg(1,500mg) -800 unit tab Take  by mouth.  travoprost (TRAVATAN Z) 0.004 % ophthalmic solution Administer 1 Drop to both eyes every evening.  ramipril (ALTACE) 5 mg capsule Take  by mouth daily.  carboxymethylcellulose sodium (REFRESH LIQUIGEL) 1 % dlgl Apply  to eye.  nitroglycerin (NITROSTAT) 0.4 mg SL tablet by SubLINGual route every five (5) minutes as needed for Chest Pain. No current facility-administered medications on file prior to visit. Physical Exam   Constitutional: She appears well-developed and well-nourished. No distress. /86 (BP 1 Location: Right arm, BP Patient Position: Sitting)  Pulse 86  Temp 98.5 °F (36.9 °C) (Oral)   Resp 20  Ht 5' 1\" (1.549 m)  Wt 88 lb (39.9 kg)  SpO2 99%  BMI 16.63 kg/m2     Eyes: EOM are normal. Right eye exhibits no discharge. Left eye exhibits no discharge. No scleral icterus. Neck: Neck supple. Cardiovascular: Normal rate, regular rhythm and normal heart sounds. Exam reveals no gallop and no friction rub. No murmur heard.   Pulmonary/Chest: Effort normal and breath sounds normal. No respiratory distress. She has no wheezes. She has no rales. Abdominal: Soft. She exhibits no distension. There is no tenderness. There is no rebound. Musculoskeletal: She exhibits no edema or tenderness. Lymphadenopathy:     She has no cervical adenopathy. Neurological: She is alert. She exhibits normal muscle tone. Skin: Skin is warm and dry. Psychiatric: She has a normal mood and affect. Lab Results   Component Value Date/Time    Sodium 138 02/14/2018 02:17 PM    Potassium 4.9 02/14/2018 02:17 PM    Chloride 100 02/14/2018 02:17 PM    CO2 30 02/14/2018 02:17 PM    Anion gap 8 02/14/2018 02:17 PM    Glucose 83 02/14/2018 02:17 PM    BUN 15 02/14/2018 02:17 PM    Creatinine 1.31 (H) 02/14/2018 02:17 PM    BUN/Creatinine ratio 11 (L) 02/14/2018 02:17 PM    GFR est AA 46 (L) 02/14/2018 02:17 PM    GFR est non-AA 38 (L) 02/14/2018 02:17 PM    Calcium 9.1 02/14/2018 02:17 PM    Bilirubin, total 0.4 02/14/2018 02:17 PM    AST (SGOT) 13 (L) 02/14/2018 02:17 PM    Alk. phosphatase 103 02/14/2018 02:17 PM    Protein, total 6.7 02/14/2018 02:17 PM    Albumin 3.5 02/14/2018 02:17 PM    Globulin 3.2 02/14/2018 02:17 PM    A-G Ratio 1.1 02/14/2018 02:17 PM    ALT (SGPT) 14 02/14/2018 02:17 PM     Lab Results   Component Value Date/Time    WBC 7.0 02/14/2018 02:17 PM    Hemoglobin, POC 11.2 (L) 04/17/2018 09:15 AM    HGB 11.6 (L) 02/14/2018 02:17 PM    Hematocrit, POC 33 (L) 04/17/2018 09:15 AM    HCT 36.7 02/14/2018 02:17 PM    PLATELET 830 30/65/8475 02:17 PM    MCV 93.9 02/14/2018 02:17 PM     ASSESSMENT and PLAN    ICD-10-CM ICD-9-CM    1. Compression fracture of body of thoracic vertebra (HCC) M48.54XA 733.13 HYDROcodone-acetaminophen (NORCO) 5-325 mg per tablet   2. Acute cystitis without hematuria N30.00 595.0    3.  Non-intractable vomiting with nausea, unspecified vomiting type R11.2 787.01 CBC WITH AUTOMATED DIFF      METABOLIC PANEL, COMPREHENSIVE     Norco refilled for short course for pain control. Will switch abx to renally dosed Cipro. Can try Zofran prn for nausea. Discussed gradually advancing diet. Repeat labs today.

## 2018-06-21 NOTE — PROGRESS NOTES
ROOM # [id number]  Identified pt with two pt identifiers(name and ). Reviewed record in preparation for visit and have obtained necessary documentation. Chief Complaint   Patient presents with   [facility] Follow Up     er f/u r/t fall,uti    Referral Follow Up     ortho      Vilma Hernandez preferred language for health care discussion is english/other. Is the patient using any DME equipment during OV? Adilene Rivas is due for:  Health Maintenance Due   Topic    DTaP/Tdap/Td series (1 - Tdap)    ZOSTER VACCINE AGE 60>     GLAUCOMA SCREENING Q2Y     Bone Densitometry (Dexa) Screening      Health Maintenance reviewed and discussed per provider  Please order/place referral if appropriate. Advance Directive:  1. Do you have an advance directive in place? Patient Reply: Tiny José Miguel    2. If not, would you like material regarding how to put one in place? NO    Coordination of Care:  1. Have you been to the ER, urgent care clinic since your last visit? Hospitalized since your last visit? YES    2. Have you seen or consulted any other health care providers outside of the [address] [place] [address] since your last visit? Include any pap smears or colon screening. NO    Patient is accompanied by son I have received verbal consent from Vilma Hernandez to discuss any/all medical information while they are present in the room.     Learning Assessment:  Learning Assessment 2017   PRIMARY LEARNER Patient   HIGHEST LEVEL OF EDUCATION - PRIMARY LEARNER  SOME COLLEGE   PRIMARY LANGUAGE ENGLISH   LEARNER PREFERENCE PRIMARY READING   ANSWERED BY Shay Lewis   RELATIONSHIP SELF     Depression Screening:  PHQ over the last two weeks 2018   Little interest or pleasure in doing things Not at all Not at all Not at all   Feeling down, depressed or hopeless Not at all Not at all Not at all   Total Score PHQ 2 0 0 0     Abuse Screening:  Abuse Screening Questionnaire 2017   Do you ever feel afraid of your partner? N   Are you in a relationship with someone who physically or mentally threatens you? N   Is it safe for you to go home? Y     Fall Risk  Fall Risk Assessment, last 12 mths 6/21/2018 2/14/2018 8/7/2017   Able to walk? Yes Yes Yes   Fall in past 12 months? Yes No No   Fall with injury?  Yes - -   Number of falls in past 12 months 1 - -   Fall Risk Score 2 - -

## 2018-06-21 NOTE — PATIENT INSTRUCTIONS
Compression Fracture of the Spine: Care Instructions  Your Care Instructions    A compression fracture happens when the front part of a spinal bone breaks and collapses. A fall or other accident can cause it. A minor injury or moving the wrong way can cause a break if you have thin or brittle bones (osteoporosis). These types of breaks will heal in 8 to 10 weeks. You will need rest and pain medicines. Your doctor may recommend physical therapy. Some doctors recommend that certain people with compression fractures wear braces. Your doctor also may treat thin or brittle bones. You may need surgery if you have lasting pain or if the bone presses on the spinal cord or nerves. You heal best when you take good care of yourself. Eat a variety of healthy foods, and don't smoke. Follow-up care is a key part of your treatment and safety. Be sure to make and go to all appointments, and call your doctor if you are having problems. It's also a good idea to know your test results and keep a list of the medicines you take. How can you care for yourself at home? · Be safe with medicines. Read and follow all instructions on the label. ¨ If the doctor gave you a prescription medicine for pain, take it as prescribed. ¨ If you are not taking a prescription pain medicine, ask your doctor if you can take an over-the-counter medicine. · Talk to your doctor about how to make your bones stronger. You may need medicines or a change in what you eat. · Be active only as directed by your doctor. When should you call for help? Call 911 anytime you think you may need emergency care. For example, call if:  ? · You are unable to move an arm or a leg at all. ?Call your doctor now or seek immediate medical care if:  ? · You have new or worse symptoms in your arms, legs, belly, or buttocks. Symptoms may include:  ¨ Numbness or tingling. ¨ Weakness. ¨ Pain. ? · You lose bladder or bowel control.    ? · You have belly pain, bloating, vomiting, or nausea. ? Watch closely for changes in your health, and be sure to contact your doctor if:  ? · You do not get better as expected. Where can you learn more? Go to http://anneliese-natalio.info/. Enter P445 in the search box to learn more about \"Compression Fracture of the Spine: Care Instructions. \"  Current as of: March 21, 2017  Content Version: 11.4  © 5843-8510 [employer]. Care instructions adapted under license by [employer] (which disclaims liability or warranty for this information). If you have questions about a medical condition or this instruction, always ask your healthcare professional. [place] [address] any warranty or liability for your use of this information. Nausea and Vomiting: Care Instructions  Your Care Instructions    When you are nauseated, you may feel weak and sweaty and notice a lot of saliva in your mouth. Nausea often leads to vomiting. Most of the time you do not need to worry about nausea and vomiting, but they can be signs of other illnesses. Two common causes of nausea and vomiting are stomach flu and food poisoning. Nausea and vomiting from viral stomach flu will usually start to improve within 24 hours. Nausea and vomiting from food poisoning may last from 12 to 48 hours. The doctor has checked you carefully, but problems can develop later. If you notice any problems or new symptoms, get medical treatment right away. Follow-up care is a key part of your treatment and safety. Be sure to make and go to all appointments, and call your doctor if you are having problems. It's also a good idea to know your test results and keep a list of the medicines you take. How can you care for yourself at home? · To prevent dehydration, drink plenty of fluids, enough so that your urine is light yellow or clear like water. Choose water and other caffeine-free clear liquids until you feel better.  If you have kidney, heart, or liver disease and have to limit fluids, talk with your doctor before you increase the amount of fluids you drink. · Rest in bed until you feel better. · When you are able to eat, try clear soups, mild foods, and liquids until all symptoms are gone for 12 to 48 hours. Other good choices include dry toast, crackers, cooked cereal, and gelatin dessert, such as Jell-O. When should you call for help? Call 911 anytime you think you may need emergency care. For example, call if:  ? · You passed out (lost consciousness). ?Call your doctor now or seek immediate medical care if:  ? · You have symptoms of dehydration, such as:  ¨ Dry eyes and a dry mouth. ¨ Passing only a little dark urine. ¨ Feeling thirstier than usual.   ? · You have new or worsening belly pain. ? · You have a new or higher fever. ? · You vomit blood or what looks like coffee grounds. ? Watch closely for changes in your health, and be sure to contact your doctor if:  ? · You have ongoing nausea and vomiting. ? · Your vomiting is getting worse. ? · Your vomiting lasts longer than 2 days. ? · You are not getting better as expected. Where can you learn more? Go to http://anneliese-natalio.info/. Enter 25 [id number] in the search box to learn more about \"Nausea and Vomiting: Care Instructions. \"  Current as of: March 20, 2017  Content Version: 11.4  © 7175-8352 [employer]. Care instructions adapted under license by [employer] (which disclaims liability or warranty for this information). If you have questions about a medical condition or this instruction, always ask your healthcare professional. [place] [address] any warranty or liability for your use of this information.

## 2018-06-22 ENCOUNTER — TELEPHONE (OUTPATIENT)
Dept: INTERNAL MEDICINE CLINIC | Age: 83
End: 2018-06-22

## 2018-06-22 NOTE — TELEPHONE ENCOUNTER
----- Message from Albert Calvillo MD sent at 6/22/2018  7:08 AM EDT -----  Please let her son know that her labs are stable except for an elevation of her white blood count. I would like her to complete the antibiotics ordered at her visit if she is able.

## 2018-06-22 NOTE — PROGRESS NOTES
Please let her son know that her labs are stable except for an elevation of her white blood count. I would like her to complete the antibiotics ordered at her visit if she is able.

## 2018-06-23 ENCOUNTER — TELEPHONE (OUTPATIENT)
Dept: INTERNAL MEDICINE CLINIC | Age: 83
End: 2018-06-23

## 2018-06-23 NOTE — TELEPHONE ENCOUNTER
Spoke with son. Pt seen on Tuesday at the ER. Then by Dr. Jose Guajardo 2 days ago for f/u. Was diagnosed with UTI and had the antibiotic changed due to side effects. (keflex initially then changed to cipro). But she is continuing to decline. No eating or drinking, is lethargic and has diarrhea. Son wanted her admitted to the hospital. I advised him that she would need to be evaluated again before that could be done and that we can not directly admit her. I advised him to return to the ER and explain to them exactly what is going on and to be insistent that they can not manage her at home. At 89, things can deteriorate quickly. He was going to call an ambulance since she is unable to get up and out of bed.

## 2018-06-29 ENCOUNTER — DOCUMENTATION ONLY (OUTPATIENT)
Dept: INTERNAL MEDICINE CLINIC | Age: 83
End: 2018-06-29

## 2018-06-29 NOTE — PROGRESS NOTES
Received a call from Edna Kaiser from [facility] requesting last Ov and Demographics to be faxed to [phone]; Info faxed per request

## 2018-06-29 NOTE — PROGRESS NOTES
HISTORY OF PRESENT ILLNESS  David Costello is a 80 y.o. female.   HPI    ROS    Physical Exam    ASSESSMENT and PLAN  {ASSESSMENT/PLAN:27881}

## 2018-07-02 ENCOUNTER — TELEPHONE (OUTPATIENT)
Dept: FAMILY MEDICINE CLINIC | Age: 83
End: 2018-07-02

## 2018-07-02 ENCOUNTER — HOSPITAL ENCOUNTER (OUTPATIENT)
Dept: MRI IMAGING | Age: 83
Discharge: HOME | End: 2018-07-02
Attending: ORTHOPAEDIC SURGERY
Payer: MEDICARE

## 2018-07-02 DIAGNOSIS — S22.000A COMPRESSION FRACTURE OF THORACIC VERTEBRA (HCC): ICD-10-CM

## 2018-07-02 PROCEDURE — 72146 MRI CHEST SPINE W/O DYE: CPT

## 2018-07-05 ENCOUNTER — TELEPHONE (OUTPATIENT)
Dept: INTERNAL MEDICINE CLINIC | Age: 83
End: 2018-07-05

## 2018-07-05 NOTE — TELEPHONE ENCOUNTER
Patient's son called to request new order for urine test sent to [address] [place] [address]. Patient's son is concerned she has a UTI but does not feel she would be able to get into PCP office due to recent fall.

## 2018-07-06 NOTE — TELEPHONE ENCOUNTER
[address] and they need a written order faxed over. Nicolasa Mckinley MD   [facility] 18 hours ago (3:36 PM)               Please see if facility can take verbal order for UA/UC.                   Documentation

## 2018-07-10 ENCOUNTER — HOSPITAL ENCOUNTER (OUTPATIENT)
Dept: INTERVENTIONAL RADIOLOGY/VASCULAR | Age: 83
Discharge: HOME | End: 2018-07-10
Attending: ORTHOPAEDIC SURGERY

## 2018-07-10 DIAGNOSIS — S22.000A COMPRESSION FRACTURE OF BODY OF THORACIC VERTEBRA (HCC): ICD-10-CM

## 2018-07-11 RX ORDER — SODIUM CHLORIDE 9 MG/ML
20 INJECTION, SOLUTION INTRAVENOUS CONTINUOUS
Status: CANCELLED | OUTPATIENT
Start: 2018-07-11

## 2018-07-12 ENCOUNTER — HOSPITAL ENCOUNTER (OUTPATIENT)
Dept: LAB | Age: 83
Discharge: HOME | End: 2018-07-12
Payer: MEDICARE

## 2018-07-12 PROCEDURE — 87086 URINE CULTURE/COLONY COUNT: CPT | Performed by: RADIOLOGY

## 2018-07-12 PROCEDURE — 81001 URINALYSIS AUTO W/SCOPE: CPT | Performed by: RADIOLOGY

## 2018-07-12 PROCEDURE — 87077 CULTURE AEROBIC IDENTIFY: CPT | Performed by: RADIOLOGY

## 2018-07-12 PROCEDURE — 87186 SC STD MICRODIL/AGAR DIL: CPT | Performed by: RADIOLOGY

## 2018-07-13 LAB
APPEARANCE UR: CLEAR
BACTERIA URNS QL MICRO: ABNORMAL /HPF
BILIRUB UR QL: NEGATIVE
COLOR UR: YELLOW
EPITH CASTS URNS QL MICRO: ABNORMAL /LPF (ref 0–5)
GLUCOSE UR STRIP.AUTO-MCNC: NEGATIVE MG/DL
HGB UR QL STRIP: NEGATIVE
KETONES UR QL STRIP.AUTO: NEGATIVE MG/DL
LEUKOCYTE ESTERASE UR QL STRIP.AUTO: ABNORMAL
NITRITE UR QL STRIP.AUTO: NEGATIVE
PH UR STRIP: 7 [PH] (ref 5–8)
PROT UR STRIP-MCNC: NEGATIVE MG/DL
RBC #/AREA URNS HPF: 0 /HPF (ref 0–5)
SP GR UR REFRACTOMETRY: 1.01 (ref 1–1.03)
UROBILINOGEN UR QL STRIP.AUTO: 0.2 EU/DL (ref 0.2–1)
WBC URNS QL MICRO: ABNORMAL /HPF (ref 0–4)

## 2018-07-15 LAB
BACTERIA SPEC CULT: ABNORMAL
SERVICE CMNT-IMP: ABNORMAL

## 2018-07-17 ENCOUNTER — TELEPHONE (OUTPATIENT)
Dept: INTERNAL MEDICINE CLINIC | Age: 83
End: 2018-07-17

## 2018-07-17 RX ORDER — CIPROFLOXACIN 500 MG/1
500 TABLET ORAL 2 TIMES DAILY
Qty: 14 TAB | Refills: 0 | Status: SHIPPED | OUTPATIENT
Start: 2018-07-17 | End: 2018-07-18 | Stop reason: SDUPTHER

## 2018-07-17 NOTE — TELEPHONE ENCOUNTER
Spoke with nurse at [facility]. Apparently UC again positive. Need to cancel kyphoplasty unless felt to be colonization. Discussed that I am not able to state that. Last treated for UTI last month. Spoke with nurse at [facility]. Discussed they may call me in future rather than faxing as these are delayed in getting to me. Rx for Cipro printed.  Please fax to [facility] at [phone]

## 2018-07-17 NOTE — TELEPHONE ENCOUNTER
[facility] called to speak with PCP regarding Kyphoplasty scheduled for 7/18/18 and a need to postpone treatment. Please contact at [phone].

## 2018-07-18 ENCOUNTER — OFFICE VISIT (OUTPATIENT)
Dept: INTERNAL MEDICINE CLINIC | Age: 83
End: 2018-07-18

## 2018-07-18 VITALS
WEIGHT: 83.8 LBS | SYSTOLIC BLOOD PRESSURE: 133 MMHG | DIASTOLIC BLOOD PRESSURE: 49 MMHG | RESPIRATION RATE: 18 BRPM | HEIGHT: 61 IN | HEART RATE: 65 BPM | OXYGEN SATURATION: 99 % | TEMPERATURE: 95.6 F | BODY MASS INDEX: 15.82 KG/M2

## 2018-07-18 DIAGNOSIS — N39.0 URINARY TRACT INFECTION WITHOUT HEMATURIA, SITE UNSPECIFIED: Primary | ICD-10-CM

## 2018-07-18 DIAGNOSIS — I10 ESSENTIAL HYPERTENSION: ICD-10-CM

## 2018-07-18 RX ORDER — CIPROFLOXACIN 500 MG/1
500 TABLET ORAL 2 TIMES DAILY
Qty: 14 TAB | Refills: 0 | Status: SHIPPED | OUTPATIENT
Start: 2018-07-18 | End: 2018-07-25

## 2018-07-18 NOTE — MR AVS SNAPSHOT
[facility] 
 
 
 [address] [facility] [address] [id number] 
[phone] Patient: Bessie Hamlin MRN: [id number] [facility]:[birth year] Visit Information Date & Time Provider Department Dept. Phone Encounter #  
 7/18/2018 10:15 AM [facility] [phone] [id number] Follow-up Instructions Return if symptoms worsen or fail to improve. Upcoming Health Maintenance Date Due DTaP/Tdap/Td series (1 - Tdap) 1/10/1949 ZOSTER VACCINE AGE 60> 11/10/1987 GLAUCOMA SCREENING Q2Y 1/10/1993 Bone Densitometry (Dexa) Screening 1/10/1993 Influenza Age 5 to Adult 8/1/2018 MEDICARE YEARLY EXAM 11/8/2018 Allergies as of 7/18/2018  Review Complete On: 7/18/2018 By: Mary Beth Ricardo Severity Noted Reaction Type Reactions Alendronate  07/27/2008    Unknown (comments) Current Immunizations  Reviewed on 3/9/2018 Name Date Influenza High Dose Vaccine PF 11/1/2017 Influenza Vaccine 10/2/2017, 10/18/2012 12:00 AM  
 Pneumococcal Conjugate (PCV-13) 5/28/2015 Pneumococcal Polysaccharide (PPSV-23) 10/18/2012 12:00 AM, 10/29/2007 TB Skin Test (PPD) Intradermal 8/4/2017 Not reviewed this visit You Were Diagnosed With   
  
 Codes Comments Urinary tract infection without hematuria, site unspecified    -  Primary ICD-10-CM: N39.0 ICD-9-CM: 599.0 Essential hypertension     ICD-10-CM: I10 
ICD-9-CM: 401.9 Vitals BP Pulse Temp Resp Height(growth percentile) Weight(growth percentile) 133/49 (BP 1 Location: Left arm, BP Patient Position: Sitting) 65 95.6 °F (35.3 °C) (Oral) 18 5' 1\" (1.549 m) 83 lb 12.8 oz (38 kg) SpO2 BMI OB Status Smoking Status 99% 15.83 kg/m2 Postmenopausal Former Smoker BMI and BSA Data Body Mass Index Body Surface Area  
 15.83 kg/m 2 1.28 m 2 Your Updated Medication List  
  
   
 This list is accurate as of 7/18/18 10:29 AM.  Always use your most recent med list.  
  
  
  
  
 aspirin delayed-release 81 mg tablet Take  by mouth daily. calcium carbonate-vitamin D3 600 mg(1,500mg) -800 unit Tab Take  by mouth. ciprofloxacin HCl 500 mg tablet Commonly known as:  CIPRO Take 1 Tab by mouth two (2) times a day for 7 days. donepezil 5 mg tablet Commonly known as:  ARICEPT Take 1 Tab by mouth nightly. For one month. If tolerated, increase to 10 mg daily after one month HYDROcodone-acetaminophen 5-325 mg per tablet Commonly known as:  Lynnetta Acevedo Take 1 Tab by mouth every six (6) hours as needed for Pain. Max Daily Amount: 4 Tabs. metoprolol succinate 50 mg XL tablet Commonly known as:  TOPROL-XL Take  by mouth daily. NITROSTAT 0.4 mg SL tablet Generic drug:  nitroglycerin  
by SubLINGual route every five (5) minutes as needed for Chest Pain. Omeprazole delayed release 20 mg tablet Commonly known as:  PRILOSEC D/R Take 1 Tab by mouth daily. Indications: Heartburn  
  
 ondansetron 4 mg disintegrating tablet Commonly known as:  ZOFRAN ODT Take 1 Tab by mouth every eight (8) hours as needed for Nausea. polyethylene glycol 17 gram/dose powder Commonly known as:  [place] Take 17 g by mouth daily as needed. Indications: constipation  
  
 ramipril 5 mg capsule Commonly known as:  ALTACE Take  by mouth daily. REFRESH LIQUIGEL 1 % Dlgl Generic drug:  carboxymethylcellulose sodium Apply  to eye. travoprost 0.004 % ophthalmic solution Commonly known as:  TRAVATAN Z Administer 1 Drop to both eyes every evening. VITAMIN D3 1,000 unit tablet Generic drug:  cholecalciferol Take  by mouth daily. Prescriptions Printed Refills  
 ciprofloxacin HCl (CIPRO) 500 mg tablet 0 Sig: Take 1 Tab by mouth two (2) times a day for 7 days. Class: Print  Route: Oral  
  
 Follow-up Instructions Return if symptoms worsen or fail to improve. Patient Instructions Urinary Tract Infection in Women: Care Instructions Your Care Instructions A urinary tract infection, or UTI, is a general term for an infection anywhere between the kidneys and the urethra (where urine comes out). Most UTIs are bladder infections. They often cause pain or burning when you urinate. UTIs are caused by bacteria and can be cured with antibiotics. Be sure to complete your treatment so that the infection goes away. Follow-up care is a key part of your treatment and safety. Be sure to make and go to all appointments, and call your doctor if you are having problems. It's also a good idea to know your test results and keep a list of the medicines you take. How can you care for yourself at home? · Take your antibiotics as directed. Do not stop taking them just because you feel better. You need to take the full course of antibiotics. · Drink extra water and other fluids for the next day or two. This may help wash out the bacteria that are causing the infection. (If you have kidney, heart, or liver disease and have to limit fluids, talk with your doctor before you increase your fluid intake.) · Avoid drinks that are carbonated or have caffeine. They can irritate the bladder. · Urinate often. Try to empty your bladder each time. · To relieve pain, take a hot bath or lay a heating pad set on low over your lower belly or genital area. Never go to sleep with a heating pad in place. To prevent UTIs · Drink plenty of water each day. This helps you urinate often, which clears bacteria from your system. (If you have kidney, heart, or liver disease and have to limit fluids, talk with your doctor before you increase your fluid intake.) · Urinate when you need to. · Urinate right after you have sex. · Change sanitary pads often. · Avoid douches, bubble baths, feminine hygiene sprays, and other feminine hygiene products that have deodorants. · After going to the bathroom, wipe from front to back. When should you call for help? Call your doctor now or seek immediate medical care if: 
  · Symptoms such as fever, chills, nausea, or vomiting get worse or appear for the first time.  
  · You have new pain in your back just below your rib cage. This is called flank pain.  
  · There is new blood or pus in your urine.  
  · You have any problems with your antibiotic medicine.  
 Watch closely for changes in your health, and be sure to contact your doctor if: 
  · You are not getting better after taking an antibiotic for 2 days.  
  · Your symptoms go away but then come back. Where can you learn more? Go to http://anneliese-natalio.info/. Enter H931 in the search box to learn more about \"Urinary Tract Infection in Women: Care Instructions. \" Current as of: May 12, 2017 Content Version: 11.7 © 4305-4655 [employer]. Care instructions adapted under license by [employer] (which disclaims liability or warranty for this information). If you have questions about a medical condition or this instruction, always ask your healthcare professional. [address] any warranty or liability for your use of this information. Introducing [facility]! Denise Cleary introduces [employer] patient portal. Now you can access parts of your medical record, email your doctor's office, and request medication refills online. 1. In your internet browser, go to [email or web address] 2. Click on the First Time User? Click Here link in the Sign In box. You will see the New Member Sign Up page. 3. Enter your [employer] Access Code exactly as it appears below. You will not need to use this code after youve completed the sign-up process.  If you do not sign up before the expiration date, you must request a new code. · [employer] Access Code: [email or web address] Expires: 9/17/2018  9:03 AM 
 
4. Enter the last four digits of your Social Security Number (xxxx) and Date of Birth (mm/dd/yyyy) as indicated and click Submit. You will be taken to the next sign-up page. 5. Create a [employer] ID. This will be your [employer] login ID and cannot be changed, so think of one that is secure and easy to remember. 6. Create a [employer] password. You can change your password at any time. 7. Enter your Password Reset Question and Answer. This can be used at a later time if you forget your password. 8. Enter your e-mail address. You will receive e-mail notification when new information is available in [address] 9. Click Sign Up. You can now view and download portions of your medical record. 10. Click the Download Summary menu link to download a portable copy of your medical information. If you have questions, please visit the Frequently Asked Questions section of the [employer] website. Remember, [employer] is NOT to be used for urgent needs. For medical emergencies, dial 911. Now available from your iPhone and Android! Please provide this summary of care documentation to your next provider. Your primary care clinician is listed as [facility] [place]. If you have any questions after today's visit, please call [phone].

## 2018-07-18 NOTE — PROGRESS NOTES
HISTORY OF PRESENT ILLNESS  Khadar Parra is a 80 y.o. female. HPI Comments: 79 yo female here for f/u of UTI. Treated last month. Side effect with initial abx. Does not have typical urinary sx, but urine studies have been ordered when altered MS, falls. > 100K CFU on 2 recent samples. Was schedule to have kyphoplasty which was canceled due to most recent UC. Pain is improving regardless. BP labile at times at nursing facility. Stable today. She has no complaints. Urinary Pain    Pertinent negatives include no chills, no nausea, no vomiting, no frequency, no hematuria, no urgency and no flank pain. Review of Systems   Constitutional: Negative for chills, fever and weight loss. HENT: Negative for congestion and ear pain. Eyes: Negative for blurred vision and pain. Respiratory: Negative for cough and shortness of breath. Cardiovascular: Negative for chest pain, palpitations and leg swelling. Gastrointestinal: Negative for nausea and vomiting. Genitourinary: Negative for dysuria, flank pain, frequency, hematuria and urgency. Musculoskeletal: Negative for joint pain and myalgias. Skin: Negative for itching and rash. Neurological: Negative for dizziness, tingling and headaches. Psychiatric/Behavioral: Negative for depression. The patient is not nervous/anxious.       Past Medical History:   Diagnosis Date    Aortic heart valve narrowing     Chronic kidney disease     Femoral hernia     GERD (gastroesophageal reflux disease)     Glaucoma     Hip fracture, left ([facility] [address] 2013    Hypercholesterolemia     Hypertension     Lump in the abdomen     Macular degeneration     Myocardial infarct, old     Pulmonary HTN ([facility] [address]     Unstable chest pain due to insufficient blood supply to heart [facility])      Current Outpatient Prescriptions on File Prior to Visit   Medication Sig Dispense Refill    ondansetron (ZOFRAN ODT) 4 mg disintegrating tablet Take 1 Tab by mouth every eight (8) hours as needed for Nausea. 30 Tab 0    HYDROcodone-acetaminophen (NORCO) 5-325 mg per tablet Take 1 Tab by mouth every six (6) hours as needed for Pain. Max Daily Amount: 4 Tabs. 12 Tab 0    Omeprazole delayed release (PRILOSEC D/R) 20 mg tablet Take 1 Tab by mouth daily. Indications: Heartburn 90 Tab 3    polyethylene glycol (MIRALAX) 17 gram/dose powder Take 17 g by mouth daily as needed. Indications: constipation 238 g 1    donepezil (ARICEPT) 5 mg tablet Take 1 Tab by mouth nightly. For one month. If tolerated, increase to 10 mg daily after one month 30 Tab 2    cholecalciferol (VITAMIN D3) 1,000 unit tablet Take  by mouth daily.  metoprolol succinate (TOPROL-XL) 50 mg XL tablet Take  by mouth daily.  aspirin delayed-release 81 mg tablet Take  by mouth daily.  calcium carbonate-vitamin D3 600 mg(1,500mg) -800 unit tab Take  by mouth.  travoprost (TRAVATAN Z) 0.004 % ophthalmic solution Administer 1 Drop to both eyes every evening.  ramipril (ALTACE) 5 mg capsule Take  by mouth daily.  carboxymethylcellulose sodium (REFRESH LIQUIGEL) 1 % dlgl Apply  to eye.  nitroglycerin (NITROSTAT) 0.4 mg SL tablet by SubLINGual route every five (5) minutes as needed for Chest Pain. No current facility-administered medications on file prior to visit. Physical Exam   Constitutional: She appears well-developed and well-nourished. No distress. /49 (BP 1 Location: Left arm, BP Patient Position: Sitting)  Pulse 65  Temp 95.6 °F (35.3 °C) (Oral)   Resp 18  Ht 5' 1\" (1.549 m)  Wt 83 lb 12.8 oz (38 kg)  SpO2 99%  BMI 15.83 kg/m2     Eyes: EOM are normal. Right eye exhibits no discharge. Left eye exhibits no discharge. No scleral icterus. Neck: Neck supple. Cardiovascular: Normal rate, regular rhythm and normal heart sounds. Exam reveals no gallop and no friction rub. Pulmonary/Chest: Effort normal and breath sounds normal. No respiratory distress.  She has no wheezes. She has no rales. Abdominal: Soft. She exhibits no distension and no mass. There is no tenderness. There is no rebound and no guarding. Musculoskeletal: She exhibits no edema or tenderness. Lymphadenopathy:     She has no cervical adenopathy. Neurological: She is alert. She exhibits normal muscle tone. Skin: Skin is warm and dry. Psychiatric: She has a normal mood and affect. Lab Results   Component Value Date/Time    Sodium 133 (L) 06/21/2018 02:14 PM    Potassium 4.3 06/21/2018 02:14 PM    Chloride 95 (L) 06/21/2018 02:14 PM    CO2 27 06/21/2018 02:14 PM    Anion gap 11 06/21/2018 02:14 PM    Glucose 135 (H) 06/21/2018 02:14 PM    BUN 51 (H) 06/21/2018 02:14 PM    Creatinine 1.19 06/21/2018 02:14 PM    BUN/Creatinine ratio 43 (H) 06/21/2018 02:14 PM    GFR est AA 52 (L) 06/21/2018 02:14 PM    GFR est non-AA 43 (L) 06/21/2018 02:14 PM    Calcium 9.8 06/21/2018 02:14 PM    Bilirubin, total 0.6 06/21/2018 02:14 PM    AST (SGOT) 20 06/21/2018 02:14 PM    Alk. phosphatase 90 06/21/2018 02:14 PM    Protein, total 7.5 06/21/2018 02:14 PM    Albumin 4.0 06/21/2018 02:14 PM    Globulin 3.5 06/21/2018 02:14 PM    A-G Ratio 1.1 06/21/2018 02:14 PM    ALT (SGPT) 16 06/21/2018 02:14 PM     Lab Results   Component Value Date/Time    WBC 15.6 (H) 06/21/2018 02:14 PM    Hemoglobin, POC 11.2 (L) 04/17/2018 09:15 AM    HGB 12.9 06/21/2018 02:14 PM    Hematocrit, POC 33 (L) 04/17/2018 09:15 AM    HCT 39.0 06/21/2018 02:14 PM    PLATELET 301 90/85/4308 02:14 PM    MCV 89.0 06/21/2018 02:14 PM     ASSESSMENT and PLAN    ICD-10-CM ICD-9-CM    1. Urinary tract infection without hematuria, site unspecified N39.0 599.0    2. Essential hypertension I10 401.9      Discussed asymptomatic bacteruria vs UTI. Not having sig sx at this time but > 100k CFU on UC done at facility due to change in MS (different organism). Will complete course of cipro. Discussed kyphoplasty. Pain is improving. BP stable today.  Will continue current medication for now and monitor.

## 2018-07-18 NOTE — PROGRESS NOTES
ROOM # 17 Identified pt with two pt identifiers(name and ). Reviewed record in preparation for visit and have obtained necessary documentation. Chief Complaint   Patient presents with    Urinary Pain      Terrie Blevins preferred language for health care discussion is english/other. Is the patient using any DME equipment during OV? Ahsan Zuniga is due for:  Health Maintenance Due   Topic    DTaP/Tdap/Td series (1 - Tdap)    ZOSTER VACCINE AGE 60>     GLAUCOMA SCREENING Q2Y     Bone Densitometry (Dexa) Screening      Health Maintenance reviewed and discussed per provider  Please order/place referral if appropriate. Advance Directive:  1. Do you have an advance directive in place? Patient Reply: Gabby Odor    2. If not, would you like material regarding how to put one in place? NO    Coordination of Care:  1. Have you been to the ER, urgent care clinic since your last visit? Hospitalized since your last visit? NO    2. Have you seen or consulted any other health care providers outside of the [address] [place] [address] since your last visit? Include any pap smears or colon screening. YES    Patient is accompanied by son I have received verbal consent from Terrie Blevins to discuss any/all medical information while they are present in the room.     Learning Assessment:  Learning Assessment 2017   PRIMARY LEARNER Patient   HIGHEST LEVEL OF EDUCATION - PRIMARY LEARNER  SOME COLLEGE   PRIMARY LANGUAGE ENGLISH   LEARNER PREFERENCE PRIMARY READING   ANSWERED BY Roselia Lorenzo   RELATIONSHIP SELF     Depression Screening:  PHQ over the last two weeks 2018   Little interest or pleasure in doing things Not at all Not at all Not at all Not at all   Feeling down, depressed, irritable, or hopeless Not at all Not at all Not at all Not at all   Total Score PHQ 2 0 0 0 0     Abuse Screening:  Abuse Screening Questionnaire 2017   Do you ever feel afraid of your partner? N   Are you in a relationship with someone who physically or mentally threatens you? N   Is it safe for you to go home? Y     Fall Risk  Fall Risk Assessment, last 12 mths 7/18/2018 6/21/2018 2/14/2018 8/7/2017   Able to walk? Yes Yes Yes Yes   Fall in past 12 months? Yes Yes No No   Fall with injury?  Yes Yes - -   Number of falls in past 12 months 2 1 - -   Fall Risk Score 3 2 - -

## 2018-07-18 NOTE — PATIENT INSTRUCTIONS
Urinary Tract Infection in Women: Care Instructions Your Care Instructions A urinary tract infection, or UTI, is a general term for an infection anywhere between the kidneys and the urethra (where urine comes out). Most UTIs are bladder infections. They often cause pain or burning when you urinate. UTIs are caused by bacteria and can be cured with antibiotics. Be sure to complete your treatment so that the infection goes away. Follow-up care is a key part of your treatment and safety. Be sure to make and go to all appointments, and call your doctor if you are having problems. It's also a good idea to know your test results and keep a list of the medicines you take. How can you care for yourself at home? · Take your antibiotics as directed. Do not stop taking them just because you feel better. You need to take the full course of antibiotics. · Drink extra water and other fluids for the next day or two. This may help wash out the bacteria that are causing the infection. (If you have kidney, heart, or liver disease and have to limit fluids, talk with your doctor before you increase your fluid intake.) · Avoid drinks that are carbonated or have caffeine. They can irritate the bladder. · Urinate often. Try to empty your bladder each time. · To relieve pain, take a hot bath or lay a heating pad set on low over your lower belly or genital area. Never go to sleep with a heating pad in place. To prevent UTIs · Drink plenty of water each day. This helps you urinate often, which clears bacteria from your system. (If you have kidney, heart, or liver disease and have to limit fluids, talk with your doctor before you increase your fluid intake.) · Urinate when you need to. · Urinate right after you have sex. · Change sanitary pads often. · Avoid douches, bubble baths, feminine hygiene sprays, and other feminine hygiene products that have deodorants.  
· After going to the bathroom, wipe from front to back. 
When should you call for help? Call your doctor now or seek immediate medical care if: 
  · Symptoms such as fever, chills, nausea, or vomiting get worse or appear for the first time.  
  · You have new pain in your back just below your rib cage. This is called flank pain.  
  · There is new blood or pus in your urine.  
  · You have any problems with your antibiotic medicine.  
 Watch closely for changes in your health, and be sure to contact your doctor if: 
  · You are not getting better after taking an antibiotic for 2 days.  
  · Your symptoms go away but then come back. Where can you learn more? Go to http://anneliese-natalio.info/. Enter V825 in the search box to learn more about \"Urinary Tract Infection in Women: Care Instructions. \" Current as of: May 12, 2017 Content Version: 11.7 © 9895-1593 [employer]. Care instructions adapted under license by [employer] (which disclaims liability or warranty for this information). If you have questions about a medical condition or this instruction, always ask your healthcare professional. [address] any warranty or liability for your use of this information.

## 2018-07-25 ENCOUNTER — HOSPITAL ENCOUNTER (OUTPATIENT)
Dept: LAB | Age: 83
Discharge: HOME | End: 2018-07-25
Payer: MEDICARE

## 2018-07-25 PROCEDURE — 87086 URINE CULTURE/COLONY COUNT: CPT | Performed by: INTERNAL MEDICINE

## 2018-07-25 PROCEDURE — 81001 URINALYSIS AUTO W/SCOPE: CPT | Performed by: INTERNAL MEDICINE

## 2018-07-26 ENCOUNTER — TELEPHONE (OUTPATIENT)
Dept: INTERNAL MEDICINE CLINIC | Age: 83
End: 2018-07-26

## 2018-07-26 DIAGNOSIS — N39.0 URINARY TRACT INFECTION WITHOUT HEMATURIA, SITE UNSPECIFIED: Primary | ICD-10-CM

## 2018-07-26 LAB
APPEARANCE UR: CLEAR
BACTERIA URNS QL MICRO: ABNORMAL /HPF
BILIRUB UR QL: NEGATIVE
CAOX CRY URNS QL MICRO: ABNORMAL
COLOR UR: YELLOW
EPITH CASTS URNS QL MICRO: ABNORMAL /LPF (ref 0–5)
GLUCOSE UR STRIP.AUTO-MCNC: NEGATIVE MG/DL
HGB UR QL STRIP: NEGATIVE
KETONES UR QL STRIP.AUTO: NEGATIVE MG/DL
LEUKOCYTE ESTERASE UR QL STRIP.AUTO: ABNORMAL
MUCOUS THREADS URNS QL MICRO: ABNORMAL /LPF
NITRITE UR QL STRIP.AUTO: NEGATIVE
PH UR STRIP: 6.5 [PH] (ref 5–8)
PROT UR STRIP-MCNC: NEGATIVE MG/DL
RBC #/AREA URNS HPF: 0 /HPF (ref 0–5)
SP GR UR REFRACTOMETRY: 1.02 (ref 1–1.03)
UROBILINOGEN UR QL STRIP.AUTO: 1 EU/DL (ref 0.2–1)
WBC URNS QL MICRO: ABNORMAL /HPF (ref 0–4)

## 2018-07-26 RX ORDER — CEFUROXIME AXETIL 500 MG/1
500 TABLET ORAL 2 TIMES DAILY
Qty: 14 TAB | Refills: 0 | Status: SHIPPED | OUTPATIENT
Start: 2018-07-26 | End: 2018-08-02

## 2018-07-26 NOTE — TELEPHONE ENCOUNTER
----- Message from Louis Healy MD sent at 7/26/2018  1:00 PM EDT -----  See result note below. Also could not send her abx electronically. Printed rx and she will need to .

## 2018-07-26 NOTE — TELEPHONE ENCOUNTER
----- Message from Luis Jones MD sent at 7/26/2018  1:00 PM EDT -----  See result note below. Also could not send her abx electronically. Printed rx and she will need to .

## 2018-07-26 NOTE — PROGRESS NOTES
See result note below. Also could not send her abx electronically. Printed rx and she will need to .

## 2018-07-26 NOTE — TELEPHONE ENCOUNTER
Son request prescription for cefUROXime (CEFTIN) 500 mg tablet to be sent to [facility], instead of printed.

## 2018-07-26 NOTE — TELEPHONE ENCOUNTER
----- Message from Viktor Rojas MD sent at 7/26/2018  1:00 PM EDT -----  See result note below. Also could not send her abx electronically. Printed rx and she will need to .

## 2018-07-27 LAB
BACTERIA SPEC CULT: NORMAL
SERVICE CMNT-IMP: NORMAL

## 2018-07-27 NOTE — PROGRESS NOTES
Pt had been on cipro prior to this lab done. Will go ahead and treat with cefuroxime since still having symptoms.

## 2018-07-27 NOTE — TELEPHONE ENCOUNTER
Son contacted at home number. 2 pt identifiers confirmed. Son verbalized that he will pick prescription up at office today. Son verbalized understanding. No other questions at this time.

## 2018-08-02 RX ORDER — PETROLATUM 42 G/100G
OINTMENT TOPICAL AS NEEDED
Qty: 452 G | Refills: 1 | Status: SHIPPED | OUTPATIENT
Start: 2018-08-02

## 2018-10-30 PROBLEM — R63.4 WEIGHT LOSS: Status: ACTIVE | Noted: 2018-01-01

## 2018-10-30 NOTE — PROGRESS NOTES
HISTORY OF PRESENT ILLNESS Read Matter is a 80 y.o. female. 81 yo female here for f/u of weight loss. Appetite is much improved. Eating most of her meals, very little Ensure which she does not like. Feels well. Son reports she seems more stable on her feet, back to her old self. BP controlled. Has already received the flu shot. Review of Systems Constitutional: Negative for chills, fever and weight loss. HENT: Negative for congestion and ear pain. Eyes: Negative for blurred vision and pain. Respiratory: Negative for cough and shortness of breath. Cardiovascular: Negative for chest pain, palpitations and leg swelling. Gastrointestinal: Negative for nausea and vomiting. Genitourinary: Negative for frequency and urgency. Musculoskeletal: Negative for joint pain and myalgias. Skin: Negative for itching and rash. Neurological: Negative for dizziness, tingling and headaches. Psychiatric/Behavioral: Negative for depression. The patient is not nervous/anxious. Past Medical History:  
Diagnosis Date  Aortic heart valve narrowing  Chronic kidney disease  Femoral hernia  GERD (gastroesophageal reflux disease)  Glaucoma  Hip fracture, left ([facility] [address] 2013  Hypercholesterolemia  Hypertension  Lump in the abdomen  Macular degeneration  Myocardial infarct, old  Pulmonary HTN ([facility] [address]  Unstable chest pain due to insufficient blood supply to heart ([facility]) Current Outpatient Medications on File Prior to Visit Medication Sig Dispense Refill  carboxymethylcellulose sodium (REFRESH LIQUIGEL) 1 % dlgl 1-2 drops each eye qhs prn 15 mL 3  
 mineral oil-hydrophil petrolat (AQUAPHOR) ointment Apply  to affected area as needed for Dry Skin. 452 g 1  
 ondansetron (ZOFRAN ODT) 4 mg disintegrating tablet Take 1 Tab by mouth every eight (8) hours as needed for Nausea.  30 Tab 0  
  Omeprazole delayed release (PRILOSEC D/R) 20 mg tablet Take 1 Tab by mouth daily. Indications: Heartburn 90 Tab 3  polyethylene glycol (MIRALAX) 17 gram/dose powder Take 17 g by mouth daily as needed. Indications: constipation 238 g 1  
 donepezil (ARICEPT) 5 mg tablet Take 1 Tab by mouth nightly. For one month. If tolerated, increase to 10 mg daily after one month 30 Tab 2  cholecalciferol (VITAMIN D3) 1,000 unit tablet Take  by mouth daily.  metoprolol succinate (TOPROL-XL) 50 mg XL tablet Take  by mouth daily.  aspirin delayed-release 81 mg tablet Take  by mouth daily.  calcium carbonate-vitamin D3 600 mg(1,500mg) -800 unit tab Take  by mouth.  travoprost (TRAVATAN Z) 0.004 % ophthalmic solution Administer 1 Drop to both eyes every evening.  ramipril (ALTACE) 5 mg capsule Take  by mouth daily.  nitroglycerin (NITROSTAT) 0.4 mg SL tablet by SubLINGual route every five (5) minutes as needed for Chest Pain. No current facility-administered medications on file prior to visit. Physical Exam  
Constitutional: She appears well-developed and well-nourished. No distress. /51 (BP 1 Location: Left arm, BP Patient Position: Sitting)   Pulse 73   Temp 95 °F (35 °C) (Oral)   Resp 16   Ht 5' 1\" (1.549 m)   Wt 85 lb 6.4 oz (38.7 kg)   SpO2 98%   BMI 16.14 kg/m² Eyes: EOM are normal. Right eye exhibits no discharge. Left eye exhibits no discharge. No scleral icterus. Neck: Neck supple. Cardiovascular: Normal rate, regular rhythm and normal heart sounds. Exam reveals no gallop and no friction rub. No murmur heard. Pulmonary/Chest: Effort normal and breath sounds normal. No respiratory distress. She has no wheezes. She has no rales. Abdominal: Soft. She exhibits no distension. There is no tenderness. There is no rebound and no guarding. Musculoskeletal: She exhibits no edema or tenderness. Lymphadenopathy:  
  She has no cervical adenopathy. Neurological: She is alert. She exhibits normal muscle tone. Skin: Skin is warm and dry. Psychiatric: She has a normal mood and affect. Lab Results Component Value Date/Time Sodium 133 (L) 06/21/2018 02:14 PM  
 Potassium 4.3 06/21/2018 02:14 PM  
 Chloride 95 (L) 06/21/2018 02:14 PM  
 CO2 27 06/21/2018 02:14 PM  
 Anion gap 11 06/21/2018 02:14 PM  
 Glucose 135 (H) 06/21/2018 02:14 PM  
 BUN 51 (H) 06/21/2018 02:14 PM  
 Creatinine 1.19 06/21/2018 02:14 PM  
 BUN/Creatinine ratio 43 (H) 06/21/2018 02:14 PM  
 GFR est AA 52 (L) 06/21/2018 02:14 PM  
 GFR est non-AA 43 (L) 06/21/2018 02:14 PM  
 Calcium 9.8 06/21/2018 02:14 PM  
 Bilirubin, total 0.6 06/21/2018 02:14 PM  
 AST (SGOT) 20 06/21/2018 02:14 PM  
 Alk. phosphatase 90 06/21/2018 02:14 PM  
 Protein, total 7.5 06/21/2018 02:14 PM  
 Albumin 4.0 06/21/2018 02:14 PM  
 Globulin 3.5 06/21/2018 02:14 PM  
 A-G Ratio 1.1 06/21/2018 02:14 PM  
 ALT (SGPT) 16 06/21/2018 02:14 PM  
 
Lab Results Component Value Date/Time WBC 15.6 (H) 06/21/2018 02:14 PM  
 Hemoglobin, POC 11.2 (L) 04/17/2018 09:15 AM  
 HGB 12.9 06/21/2018 02:14 PM  
 Hematocrit, POC 33 (L) 04/17/2018 09:15 AM  
 HCT 39.0 06/21/2018 02:14 PM  
 PLATELET 644 08/94/9833 02:14 PM  
 MCV 89.0 06/21/2018 02:14 PM  
 
ASSESSMENT and PLAN 
  ICD-10-CM ICD-9-CM 1. Weight loss R63.4 783.21   
2. Essential hypertension I10 401.9 Will discontinue Ensure Continue current medication.

## 2018-10-30 NOTE — PROGRESS NOTES
ROOM # 18 Identified pt with two pt identifiers(name and ). Reviewed record in preparation for visit and have obtained necessary documentation. Chief Complaint Patient presents with  Weight Management Chad Baez preferred language for health care discussion is english/other. Is the patient using any DME equipment during OV? YES Chad Baez is due for: 
Health Maintenance Due Topic  DTaP/Tdap/Td series (1 - Tdap)  Shingrix Vaccine Age 50> (1 of 2)  GLAUCOMA SCREENING Q2Y  Bone Densitometry (Dexa) Screening  Influenza Age 5 to Adult Health Maintenance reviewed and discussed per provider Please order/place referral if appropriate. Advance Directive: 1. Do you have an advance directive in place? Patient Reply: Lisbet Frazier 
 
2. If not, would you like material regarding how to put one in place? NO Coordination of Care: 1. Have you been to the ER, urgent care clinic since your last visit? Hospitalized since your last visit? NO 
 
2. Have you seen or consulted any other health care providers outside of the [address] [place] [address] since your last visit? Include any pap smears or colon screening. NO Patient is accompanied by son I have received verbal consent from Chad Baez to discuss any/all medical information while they are present in the room. Learning Assessment: 
Learning Assessment 2017 PRIMARY LEARNER Patient HIGHEST LEVEL OF EDUCATION - PRIMARY LEARNER  SOME COLLEGE PRIMARY LANGUAGE ENGLISH  
LEARNER PREFERENCE PRIMARY READING  
ANSWERED BY Randa Tovar RELATIONSHIP SELF Depression Screening: PHQ over the last two weeks 10/30/2018 2018 2018 2018 2017 Little interest or pleasure in doing things Not at all Not at all Not at all Not at all Not at all Feeling down, depressed, irritable, or hopeless Not at all Not at all Not at all Not at all Not at all Total Score PHQ 2 0 0 0 0 0 Abuse Screening: 
Abuse Screening Questionnaire 8/7/2017 Do you ever feel afraid of your partner? Hilda Rojo Are you in a relationship with someone who physically or mentally threatens you? Hilda Rojo Is it safe for you to go home? Jaqui Ortiz Fall Risk Fall Risk Assessment, last 12 mths 10/30/2018 7/18/2018 6/21/2018 2/14/2018 8/7/2017 Able to walk? Yes Yes Yes Yes Yes Fall in past 12 months? Yes Yes Yes No No  
Fall with injury? Yes Yes Yes - - Number of falls in past 12 months 1 2 1 - - Fall Risk Score 2 3 2 - -

## 2019-01-01 ENCOUNTER — HOME CARE VISIT (OUTPATIENT)
Dept: HOSPICE | Facility: HOSPICE | Age: 84
End: 2019-01-01
Payer: MEDICARE

## 2019-01-01 ENCOUNTER — TELEPHONE (OUTPATIENT)
Dept: INTERNAL MEDICINE CLINIC | Age: 84
End: 2019-01-01

## 2019-01-01 ENCOUNTER — HOSPITAL ENCOUNTER (OUTPATIENT)
Dept: LAB | Age: 84
Discharge: HOME | End: 2019-05-13
Payer: MEDICARE

## 2019-01-01 ENCOUNTER — HOME CARE VISIT (OUTPATIENT)
Dept: SCHEDULING | Facility: HOME HEALTH | Age: 84
End: 2019-01-01
Payer: MEDICARE

## 2019-01-01 ENCOUNTER — HOSPITAL ENCOUNTER (EMERGENCY)
Age: 84
Discharge: HOME | End: 2019-05-27
Attending: EMERGENCY MEDICINE
Payer: MEDICARE

## 2019-01-01 ENCOUNTER — HOME CARE VISIT (OUTPATIENT)
Dept: HOME HEALTH SERVICES | Facility: HOME HEALTH | Age: 84
End: 2019-01-01
Payer: MEDICARE

## 2019-01-01 ENCOUNTER — APPOINTMENT (OUTPATIENT)
Dept: CT IMAGING | Age: 84
End: 2019-01-01
Attending: EMERGENCY MEDICINE
Payer: MEDICARE

## 2019-01-01 ENCOUNTER — HOME CARE VISIT (OUTPATIENT)
Dept: HOME HEALTH SERVICES | Facility: HOME HEALTH | Age: 84
End: 2019-01-01

## 2019-01-01 ENCOUNTER — HOME CARE VISIT (OUTPATIENT)
Dept: SCHEDULING | Facility: HOME HEALTH | Age: 84
End: 2019-01-01

## 2019-01-01 ENCOUNTER — PATIENT OUTREACH (OUTPATIENT)
Dept: INTERNAL MEDICINE CLINIC | Age: 84
End: 2019-01-01

## 2019-01-01 ENCOUNTER — DOCUMENTATION ONLY (OUTPATIENT)
Dept: INTERNAL MEDICINE CLINIC | Age: 84
End: 2019-01-01

## 2019-01-01 ENCOUNTER — HOSPITAL ENCOUNTER (INPATIENT)
Age: 84
LOS: 2 days | Discharge: HOSPICE | DRG: 392 | End: 2019-07-19
Attending: EMERGENCY MEDICINE | Admitting: FAMILY MEDICINE
Payer: MEDICARE

## 2019-01-01 ENCOUNTER — OFFICE VISIT (OUTPATIENT)
Dept: INTERNAL MEDICINE CLINIC | Age: 84
End: 2019-01-01

## 2019-01-01 ENCOUNTER — HOSPITAL ENCOUNTER (EMERGENCY)
Dept: CT IMAGING | Age: 84
Discharge: HOME | DRG: 392 | End: 2019-07-16
Attending: PHYSICIAN ASSISTANT
Payer: MEDICARE

## 2019-01-01 ENCOUNTER — HOSPICE ADMISSION (OUTPATIENT)
Dept: HOSPICE | Facility: HOSPICE | Age: 84
End: 2019-01-01
Payer: MEDICARE

## 2019-01-01 ENCOUNTER — APPOINTMENT (OUTPATIENT)
Dept: GENERAL RADIOLOGY | Age: 84
DRG: 392 | End: 2019-01-01
Attending: INTERNAL MEDICINE
Payer: MEDICARE

## 2019-01-01 ENCOUNTER — APPOINTMENT (OUTPATIENT)
Dept: GENERAL RADIOLOGY | Age: 84
DRG: 392 | End: 2019-01-01
Attending: PHYSICIAN ASSISTANT
Payer: MEDICARE

## 2019-01-01 ENCOUNTER — HOME HEALTH ADMISSION (OUTPATIENT)
Dept: HOME HEALTH SERVICES | Facility: HOME HEALTH | Age: 84
End: 2019-01-01
Payer: MEDICARE

## 2019-01-01 ENCOUNTER — APPOINTMENT (OUTPATIENT)
Dept: GENERAL RADIOLOGY | Age: 84
End: 2019-01-01
Attending: EMERGENCY MEDICINE
Payer: MEDICARE

## 2019-01-01 VITALS
TEMPERATURE: 98.7 F | DIASTOLIC BLOOD PRESSURE: 60 MMHG | HEART RATE: 72 BPM | OXYGEN SATURATION: 96 % | SYSTOLIC BLOOD PRESSURE: 118 MMHG

## 2019-01-01 VITALS
HEART RATE: 78 BPM | RESPIRATION RATE: 14 BRPM | SYSTOLIC BLOOD PRESSURE: 101 MMHG | DIASTOLIC BLOOD PRESSURE: 85 MMHG | OXYGEN SATURATION: 95 %

## 2019-01-01 VITALS
SYSTOLIC BLOOD PRESSURE: 120 MMHG | OXYGEN SATURATION: 95 % | TEMPERATURE: 98.1 F | HEART RATE: 75 BPM | DIASTOLIC BLOOD PRESSURE: 58 MMHG

## 2019-01-01 VITALS
OXYGEN SATURATION: 95 % | DIASTOLIC BLOOD PRESSURE: 65 MMHG | HEART RATE: 63 BPM | SYSTOLIC BLOOD PRESSURE: 148 MMHG | TEMPERATURE: 97.7 F

## 2019-01-01 VITALS
HEART RATE: 104 BPM | SYSTOLIC BLOOD PRESSURE: 105 MMHG | RESPIRATION RATE: 22 BRPM | OXYGEN SATURATION: 97 % | DIASTOLIC BLOOD PRESSURE: 67 MMHG

## 2019-01-01 VITALS
TEMPERATURE: 97.8 F | OXYGEN SATURATION: 99 % | SYSTOLIC BLOOD PRESSURE: 163 MMHG | RESPIRATION RATE: 17 BRPM | DIASTOLIC BLOOD PRESSURE: 43 MMHG | HEART RATE: 60 BPM

## 2019-01-01 VITALS
BODY MASS INDEX: 15.86 KG/M2 | WEIGHT: 84 LBS | HEIGHT: 61 IN | DIASTOLIC BLOOD PRESSURE: 53 MMHG | SYSTOLIC BLOOD PRESSURE: 117 MMHG | RESPIRATION RATE: 16 BRPM | TEMPERATURE: 96 F | OXYGEN SATURATION: 99 % | HEART RATE: 68 BPM

## 2019-01-01 VITALS
SYSTOLIC BLOOD PRESSURE: 146 MMHG | HEART RATE: 51 BPM | TEMPERATURE: 98.2 F | DIASTOLIC BLOOD PRESSURE: 65 MMHG | OXYGEN SATURATION: 97 %

## 2019-01-01 VITALS
HEART RATE: 70 BPM | SYSTOLIC BLOOD PRESSURE: 105 MMHG | TEMPERATURE: 97.7 F | DIASTOLIC BLOOD PRESSURE: 61 MMHG | OXYGEN SATURATION: 94 %

## 2019-01-01 VITALS
HEIGHT: 60 IN | SYSTOLIC BLOOD PRESSURE: 135 MMHG | DIASTOLIC BLOOD PRESSURE: 76 MMHG | WEIGHT: 86 LBS | BODY MASS INDEX: 16.88 KG/M2 | RESPIRATION RATE: 18 BRPM | OXYGEN SATURATION: 94 % | HEART RATE: 75 BPM

## 2019-01-01 VITALS
TEMPERATURE: 97.6 F | HEART RATE: 71 BPM | OXYGEN SATURATION: 95 % | SYSTOLIC BLOOD PRESSURE: 124 MMHG | DIASTOLIC BLOOD PRESSURE: 56 MMHG

## 2019-01-01 VITALS
SYSTOLIC BLOOD PRESSURE: 140 MMHG | HEART RATE: 65 BPM | TEMPERATURE: 97.3 F | DIASTOLIC BLOOD PRESSURE: 60 MMHG | OXYGEN SATURATION: 97 %

## 2019-01-01 VITALS
OXYGEN SATURATION: 97 % | TEMPERATURE: 98 F | HEART RATE: 68 BPM | DIASTOLIC BLOOD PRESSURE: 62 MMHG | SYSTOLIC BLOOD PRESSURE: 103 MMHG

## 2019-01-01 VITALS
SYSTOLIC BLOOD PRESSURE: 117 MMHG | OXYGEN SATURATION: 99 % | TEMPERATURE: 97.5 F | HEART RATE: 63 BPM | DIASTOLIC BLOOD PRESSURE: 46 MMHG

## 2019-01-01 VITALS
DIASTOLIC BLOOD PRESSURE: 61 MMHG | HEART RATE: 70 BPM | TEMPERATURE: 97.7 F | SYSTOLIC BLOOD PRESSURE: 105 MMHG | OXYGEN SATURATION: 94 %

## 2019-01-01 VITALS
SYSTOLIC BLOOD PRESSURE: 141 MMHG | OXYGEN SATURATION: 95 % | DIASTOLIC BLOOD PRESSURE: 85 MMHG | RESPIRATION RATE: 18 BRPM | HEART RATE: 89 BPM

## 2019-01-01 VITALS
RESPIRATION RATE: 11 BRPM | DIASTOLIC BLOOD PRESSURE: 72 MMHG | OXYGEN SATURATION: 84 % | SYSTOLIC BLOOD PRESSURE: 116 MMHG | HEART RATE: 65 BPM

## 2019-01-01 VITALS
SYSTOLIC BLOOD PRESSURE: 154 MMHG | OXYGEN SATURATION: 97 % | HEART RATE: 66 BPM | TEMPERATURE: 97.7 F | DIASTOLIC BLOOD PRESSURE: 66 MMHG

## 2019-01-01 VITALS
DIASTOLIC BLOOD PRESSURE: 60 MMHG | HEART RATE: 73 BPM | SYSTOLIC BLOOD PRESSURE: 128 MMHG | OXYGEN SATURATION: 98 % | TEMPERATURE: 98.6 F

## 2019-01-01 VITALS — HEART RATE: 71 BPM | OXYGEN SATURATION: 95 % | RESPIRATION RATE: 16 BRPM

## 2019-01-01 VITALS
HEART RATE: 70 BPM | OXYGEN SATURATION: 95 % | HEIGHT: 60 IN | WEIGHT: 86 LBS | SYSTOLIC BLOOD PRESSURE: 150 MMHG | BODY MASS INDEX: 16.88 KG/M2 | RESPIRATION RATE: 18 BRPM | TEMPERATURE: 98 F | DIASTOLIC BLOOD PRESSURE: 70 MMHG

## 2019-01-01 VITALS
SYSTOLIC BLOOD PRESSURE: 144 MMHG | DIASTOLIC BLOOD PRESSURE: 60 MMHG | TEMPERATURE: 98.3 F | HEART RATE: 69 BPM | OXYGEN SATURATION: 90 %

## 2019-01-01 VITALS
SYSTOLIC BLOOD PRESSURE: 122 MMHG | HEART RATE: 68 BPM | TEMPERATURE: 97.6 F | DIASTOLIC BLOOD PRESSURE: 54 MMHG | OXYGEN SATURATION: 96 %

## 2019-01-01 VITALS
OXYGEN SATURATION: 97 % | TEMPERATURE: 97.5 F | SYSTOLIC BLOOD PRESSURE: 122 MMHG | DIASTOLIC BLOOD PRESSURE: 60 MMHG | HEART RATE: 75 BPM

## 2019-01-01 VITALS
TEMPERATURE: 98.3 F | OXYGEN SATURATION: 91 % | HEART RATE: 58 BPM | DIASTOLIC BLOOD PRESSURE: 52 MMHG | SYSTOLIC BLOOD PRESSURE: 140 MMHG

## 2019-01-01 VITALS — HEART RATE: 71 BPM | OXYGEN SATURATION: 94 %

## 2019-01-01 VITALS
TEMPERATURE: 97.4 F | SYSTOLIC BLOOD PRESSURE: 124 MMHG | HEART RATE: 68 BPM | OXYGEN SATURATION: 99 % | DIASTOLIC BLOOD PRESSURE: 50 MMHG

## 2019-01-01 VITALS
RESPIRATION RATE: 20 BRPM | SYSTOLIC BLOOD PRESSURE: 110 MMHG | DIASTOLIC BLOOD PRESSURE: 60 MMHG | TEMPERATURE: 97.4 F | HEART RATE: 84 BPM

## 2019-01-01 DIAGNOSIS — F02.80 LATE ONSET ALZHEIMER'S DISEASE WITHOUT BEHAVIORAL DISTURBANCE (HCC): ICD-10-CM

## 2019-01-01 DIAGNOSIS — K44.9 HIATAL HERNIA: ICD-10-CM

## 2019-01-01 DIAGNOSIS — R55 SYNCOPE AND COLLAPSE: Primary | ICD-10-CM

## 2019-01-01 DIAGNOSIS — G30.1 LATE ONSET ALZHEIMER'S DISEASE WITHOUT BEHAVIORAL DISTURBANCE (HCC): ICD-10-CM

## 2019-01-01 DIAGNOSIS — Z00.00 MEDICARE ANNUAL WELLNESS VISIT, SUBSEQUENT: Primary | ICD-10-CM

## 2019-01-01 DIAGNOSIS — N30.00 ACUTE CYSTITIS WITHOUT HEMATURIA: Primary | ICD-10-CM

## 2019-01-01 DIAGNOSIS — I10 ESSENTIAL HYPERTENSION: ICD-10-CM

## 2019-01-01 DIAGNOSIS — R07.89 ATYPICAL CHEST PAIN: ICD-10-CM

## 2019-01-01 LAB
ALBUMIN SERPL-MCNC: 3.6 G/DL (ref 3.4–5)
ALBUMIN SERPL-MCNC: 3.8 G/DL (ref 3.4–5)
ALBUMIN/GLOB SERPL: 1.1 {RATIO} (ref 0.8–1.7)
ALBUMIN/GLOB SERPL: 1.4 {RATIO} (ref 0.8–1.7)
ALP SERPL-CCNC: 63 U/L (ref 45–117)
ALP SERPL-CCNC: 80 U/L (ref 45–117)
ALT SERPL-CCNC: 13 U/L (ref 13–56)
ALT SERPL-CCNC: 14 U/L (ref 13–56)
AMMONIA PLAS-SCNC: 13 UMOL/L (ref 11–32)
ANION GAP SERPL CALC-SCNC: 6 MMOL/L (ref 3–18)
ANION GAP SERPL CALC-SCNC: 8 MMOL/L (ref 3–18)
APPEARANCE UR: ABNORMAL
APPEARANCE UR: ABNORMAL
APPEARANCE UR: CLEAR
APTT PPP: 27.9 SEC (ref 23–36.4)
AST SERPL-CCNC: 14 U/L (ref 15–37)
AST SERPL-CCNC: 14 U/L (ref 15–37)
ATRIAL RATE: 55 BPM
ATRIAL RATE: 57 BPM
ATRIAL RATE: 73 BPM
BACTERIA SPEC CULT: ABNORMAL
BACTERIA SPEC CULT: NORMAL
BACTERIA URNS QL MICRO: ABNORMAL /HPF
BACTERIA URNS QL MICRO: ABNORMAL /HPF
BASOPHILS # BLD: 0 K/UL (ref 0–0.1)
BASOPHILS NFR BLD: 0 % (ref 0–2)
BILIRUB SERPL-MCNC: 0.4 MG/DL (ref 0.2–1)
BILIRUB SERPL-MCNC: 0.4 MG/DL (ref 0.2–1)
BILIRUB UR QL: NEGATIVE
BNP SERPL-MCNC: 643 PG/ML (ref 0–1800)
BUN SERPL-MCNC: 24 MG/DL (ref 7–18)
BUN SERPL-MCNC: 25 MG/DL (ref 7–18)
BUN/CREAT SERPL: 20 (ref 12–20)
BUN/CREAT SERPL: 22 (ref 12–20)
CALCIUM SERPL-MCNC: 9.4 MG/DL (ref 8.5–10.1)
CALCIUM SERPL-MCNC: 9.5 MG/DL (ref 8.5–10.1)
CALCULATED P AXIS, ECG09: 31 DEGREES
CALCULATED P AXIS, ECG09: 45 DEGREES
CALCULATED P AXIS, ECG09: 48 DEGREES
CALCULATED R AXIS, ECG10: 61 DEGREES
CALCULATED R AXIS, ECG10: 65 DEGREES
CALCULATED R AXIS, ECG10: 78 DEGREES
CALCULATED T AXIS, ECG11: 60 DEGREES
CALCULATED T AXIS, ECG11: 60 DEGREES
CALCULATED T AXIS, ECG11: 62 DEGREES
CHLORIDE SERPL-SCNC: 101 MMOL/L (ref 100–108)
CHLORIDE SERPL-SCNC: 103 MMOL/L (ref 100–108)
CK MB CFR SERPL CALC: 1.8 % (ref 0–4)
CK MB CFR SERPL CALC: NORMAL % (ref 0–4)
CK MB SERPL-MCNC: 1.4 NG/ML (ref 5–25)
CK MB SERPL-MCNC: <1 NG/ML (ref 5–25)
CK SERPL-CCNC: 27 U/L (ref 26–192)
CK SERPL-CCNC: 76 U/L (ref 26–192)
CO2 SERPL-SCNC: 28 MMOL/L (ref 21–32)
CO2 SERPL-SCNC: 32 MMOL/L (ref 21–32)
COLOR UR: YELLOW
CREAT SERPL-MCNC: 1.11 MG/DL (ref 0.6–1.3)
CREAT SERPL-MCNC: 1.22 MG/DL (ref 0.6–1.3)
DIAGNOSIS, 93000: NORMAL
DIFFERENTIAL METHOD BLD: ABNORMAL
EOSINOPHIL # BLD: 0.1 K/UL (ref 0–0.4)
EOSINOPHIL NFR BLD: 1 % (ref 0–5)
EPITH CASTS URNS QL MICRO: ABNORMAL /LPF (ref 0–5)
EPITH CASTS URNS QL MICRO: ABNORMAL /LPF (ref 0–5)
ERYTHROCYTE [DISTWIDTH] IN BLOOD BY AUTOMATED COUNT: 14.1 % (ref 11.6–14.5)
ERYTHROCYTE [DISTWIDTH] IN BLOOD BY AUTOMATED COUNT: 14.4 % (ref 11.6–14.5)
GLOBULIN SER CALC-MCNC: 2.8 G/DL (ref 2–4)
GLOBULIN SER CALC-MCNC: 3.4 G/DL (ref 2–4)
GLUCOSE SERPL-MCNC: 115 MG/DL (ref 74–99)
GLUCOSE SERPL-MCNC: 98 MG/DL (ref 74–99)
GLUCOSE UR STRIP.AUTO-MCNC: NEGATIVE MG/DL
HCT VFR BLD AUTO: 34.1 % (ref 35–45)
HCT VFR BLD AUTO: 35.7 % (ref 35–45)
HGB BLD-MCNC: 11.2 G/DL (ref 12–16)
HGB BLD-MCNC: 11.5 G/DL (ref 12–16)
HGB UR QL STRIP: NEGATIVE
INR PPP: 0.9 (ref 0.8–1.2)
KETONES UR QL STRIP.AUTO: ABNORMAL MG/DL
KETONES UR QL STRIP.AUTO: NEGATIVE MG/DL
KETONES UR QL STRIP.AUTO: NEGATIVE MG/DL
LEUKOCYTE ESTERASE UR QL STRIP.AUTO: ABNORMAL
LEUKOCYTE ESTERASE UR QL STRIP.AUTO: ABNORMAL
LEUKOCYTE ESTERASE UR QL STRIP.AUTO: NEGATIVE
LIPASE SERPL-CCNC: 160 U/L (ref 73–393)
LYMPHOCYTES # BLD: 1.3 K/UL (ref 0.9–3.6)
LYMPHOCYTES NFR BLD: 19 % (ref 21–52)
MAGNESIUM SERPL-MCNC: 2.2 MG/DL (ref 1.6–2.6)
MCH RBC QN AUTO: 29.3 PG (ref 24–34)
MCH RBC QN AUTO: 29.5 PG (ref 24–34)
MCHC RBC AUTO-ENTMCNC: 32.2 G/DL (ref 31–37)
MCHC RBC AUTO-ENTMCNC: 32.8 G/DL (ref 31–37)
MCV RBC AUTO: 89.7 FL (ref 74–97)
MCV RBC AUTO: 91.1 FL (ref 74–97)
MONOCYTES # BLD: 0.6 K/UL (ref 0.05–1.2)
MONOCYTES NFR BLD: 9 % (ref 3–10)
NEUTS SEG # BLD: 4.8 K/UL (ref 1.8–8)
NEUTS SEG NFR BLD: 71 % (ref 40–73)
NITRITE UR QL STRIP.AUTO: NEGATIVE
P-R INTERVAL, ECG05: 110 MS
P-R INTERVAL, ECG05: 128 MS
P-R INTERVAL, ECG05: 128 MS
PH UR STRIP: 5 [PH] (ref 5–8)
PH UR STRIP: 5.5 [PH] (ref 5–8)
PH UR STRIP: 6 [PH] (ref 5–8)
PLATELET # BLD AUTO: 197 K/UL (ref 135–420)
PLATELET # BLD AUTO: 237 K/UL (ref 135–420)
PMV BLD AUTO: 10.3 FL (ref 9.2–11.8)
PMV BLD AUTO: 9.6 FL (ref 9.2–11.8)
POTASSIUM SERPL-SCNC: 4.2 MMOL/L (ref 3.5–5.5)
POTASSIUM SERPL-SCNC: 4.6 MMOL/L (ref 3.5–5.5)
PROT SERPL-MCNC: 6.6 G/DL (ref 6.4–8.2)
PROT SERPL-MCNC: 7 G/DL (ref 6.4–8.2)
PROT UR STRIP-MCNC: NEGATIVE MG/DL
PROTHROMBIN TIME: 11.8 SEC (ref 11.5–15.2)
Q-T INTERVAL, ECG07: 348 MS
Q-T INTERVAL, ECG07: 390 MS
Q-T INTERVAL, ECG07: 392 MS
QRS DURATION, ECG06: 68 MS
QRS DURATION, ECG06: 72 MS
QRS DURATION, ECG06: 82 MS
QTC CALCULATION (BEZET), ECG08: 373 MS
QTC CALCULATION (BEZET), ECG08: 381 MS
QTC CALCULATION (BEZET), ECG08: 383 MS
RBC # BLD AUTO: 3.8 M/UL (ref 4.2–5.3)
RBC # BLD AUTO: 3.92 M/UL (ref 4.2–5.3)
RBC #/AREA URNS HPF: 0 /HPF (ref 0–5)
RBC #/AREA URNS HPF: 0 /HPF (ref 0–5)
SERVICE CMNT-IMP: ABNORMAL
SERVICE CMNT-IMP: ABNORMAL
SERVICE CMNT-IMP: NORMAL
SODIUM SERPL-SCNC: 139 MMOL/L (ref 136–145)
SODIUM SERPL-SCNC: 139 MMOL/L (ref 136–145)
SP GR UR REFRACTOMETRY: 1.02 (ref 1–1.03)
TROPONIN I SERPL-MCNC: <0.02 NG/ML (ref 0–0.04)
TROPONIN I SERPL-MCNC: <0.02 NG/ML (ref 0–0.04)
TSH SERPL DL<=0.05 MIU/L-ACNC: 1.77 UIU/ML (ref 0.36–3.74)
UROBILINOGEN UR QL STRIP.AUTO: 0.2 EU/DL (ref 0.2–1)
VENTRICULAR RATE, ECG03: 55 BPM
VENTRICULAR RATE, ECG03: 57 BPM
VENTRICULAR RATE, ECG03: 73 BPM
WBC # BLD AUTO: 6.7 K/UL (ref 4.6–13.2)
WBC # BLD AUTO: 7 K/UL (ref 4.6–13.2)
WBC URNS QL MICRO: ABNORMAL /HPF (ref 0–4)
WBC URNS QL MICRO: ABNORMAL /HPF (ref 0–4)

## 2019-01-01 PROCEDURE — 74011000250 HC RX REV CODE- 250: Performed by: FAMILY MEDICINE

## 2019-01-01 PROCEDURE — 0651 HSPC ROUTINE HOME CARE

## 2019-01-01 PROCEDURE — 3331090002 HH PPS REVENUE DEBIT

## 2019-01-01 PROCEDURE — 96360 HYDRATION IV INFUSION INIT: CPT

## 2019-01-01 PROCEDURE — 74011000250 HC RX REV CODE- 250: Performed by: HOSPITALIST

## 2019-01-01 PROCEDURE — C9113 INJ PANTOPRAZOLE SODIUM, VIA: HCPCS | Performed by: HOSPITALIST

## 2019-01-01 PROCEDURE — 3331090001 HH PPS REVENUE CREDIT

## 2019-01-01 PROCEDURE — 85730 THROMBOPLASTIN TIME PARTIAL: CPT

## 2019-01-01 PROCEDURE — G0299 HHS/HOSPICE OF RN EA 15 MIN: HCPCS

## 2019-01-01 PROCEDURE — 87186 SC STD MICRODIL/AGAR DIL: CPT

## 2019-01-01 PROCEDURE — A9270 NON-COVERED ITEM OR SERVICE: HCPCS

## 2019-01-01 PROCEDURE — 400013 HH SOC

## 2019-01-01 PROCEDURE — G0153 HHCP-SVS OF S/L PATH,EA 15MN: HCPCS

## 2019-01-01 PROCEDURE — 80053 COMPREHEN METABOLIC PANEL: CPT

## 2019-01-01 PROCEDURE — 83880 ASSAY OF NATRIURETIC PEPTIDE: CPT

## 2019-01-01 PROCEDURE — 87077 CULTURE AEROBIC IDENTIFY: CPT

## 2019-01-01 PROCEDURE — T4541 LARGE DISPOSABLE UNDERPAD: HCPCS

## 2019-01-01 PROCEDURE — 83735 ASSAY OF MAGNESIUM: CPT

## 2019-01-01 PROCEDURE — 93005 ELECTROCARDIOGRAM TRACING: CPT

## 2019-01-01 PROCEDURE — 70450 CT HEAD/BRAIN W/O DYE: CPT

## 2019-01-01 PROCEDURE — 74011250636 HC RX REV CODE- 250/636

## 2019-01-01 PROCEDURE — G0157 HHC PT ASSISTANT EA 15: HCPCS

## 2019-01-01 PROCEDURE — 74011250636 HC RX REV CODE- 250/636: Performed by: FAMILY MEDICINE

## 2019-01-01 PROCEDURE — 81001 URINALYSIS AUTO W/SCOPE: CPT

## 2019-01-01 PROCEDURE — 3331090004 HSPC SERVICE INTENSITY ADD-ON

## 2019-01-01 PROCEDURE — 77030005563 HC CATH URETH INT MMGH -A

## 2019-01-01 PROCEDURE — 3331090003 HH PPS REVENUE ADJ

## 2019-01-01 PROCEDURE — 71046 X-RAY EXAM CHEST 2 VIEWS: CPT

## 2019-01-01 PROCEDURE — 71045 X-RAY EXAM CHEST 1 VIEW: CPT

## 2019-01-01 PROCEDURE — 82140 ASSAY OF AMMONIA: CPT

## 2019-01-01 PROCEDURE — 85027 COMPLETE CBC AUTOMATED: CPT

## 2019-01-01 PROCEDURE — 84443 ASSAY THYROID STIM HORMONE: CPT

## 2019-01-01 PROCEDURE — 87086 URINE CULTURE/COLONY COUNT: CPT

## 2019-01-01 PROCEDURE — 96361 HYDRATE IV INFUSION ADD-ON: CPT

## 2019-01-01 PROCEDURE — G0151 HHCP-SERV OF PT,EA 15 MIN: HCPCS

## 2019-01-01 PROCEDURE — 82550 ASSAY OF CK (CPK): CPT

## 2019-01-01 PROCEDURE — 94761 N-INVAS EAR/PLS OXIMETRY MLT: CPT

## 2019-01-01 PROCEDURE — 99285 EMERGENCY DEPT VISIT HI MDM: CPT

## 2019-01-01 PROCEDURE — HHS10554 SHAMPOO/BODY WASH 8 OZ ALOE VESTA

## 2019-01-01 PROCEDURE — 74177 CT ABD & PELVIS W/CONTRAST: CPT

## 2019-01-01 PROCEDURE — 74011000258 HC RX REV CODE- 258: Performed by: FAMILY MEDICINE

## 2019-01-01 PROCEDURE — A6250 SKIN SEAL PROTECT MOISTURIZR: HCPCS

## 2019-01-01 PROCEDURE — 96374 THER/PROPH/DIAG INJ IV PUSH: CPT

## 2019-01-01 PROCEDURE — G0155 HHCP-SVS OF CSW,EA 15 MIN: HCPCS

## 2019-01-01 PROCEDURE — 74011636320 HC RX REV CODE- 636/320: Performed by: PHYSICIAN ASSISTANT

## 2019-01-01 PROCEDURE — 65270000029 HC RM PRIVATE

## 2019-01-01 PROCEDURE — 74011250636 HC RX REV CODE- 250/636: Performed by: PHYSICIAN ASSISTANT

## 2019-01-01 PROCEDURE — 96375 TX/PRO/DX INJ NEW DRUG ADDON: CPT

## 2019-01-01 PROCEDURE — 81003 URINALYSIS AUTO W/O SCOPE: CPT

## 2019-01-01 PROCEDURE — 74011250636 HC RX REV CODE- 250/636: Performed by: HOSPITALIST

## 2019-01-01 PROCEDURE — T4522 ADULT SIZE BRIEF/DIAPER MED: HCPCS

## 2019-01-01 PROCEDURE — 83690 ASSAY OF LIPASE: CPT

## 2019-01-01 PROCEDURE — G0158 HHC OT ASSISTANT EA 15: HCPCS

## 2019-01-01 PROCEDURE — 85610 PROTHROMBIN TIME: CPT

## 2019-01-01 PROCEDURE — 74011250636 HC RX REV CODE- 250/636: Performed by: EMERGENCY MEDICINE

## 2019-01-01 PROCEDURE — 3336500001 HSPC ELECTION

## 2019-01-01 PROCEDURE — G0152 HHCP-SERV OF OT,EA 15 MIN: HCPCS

## 2019-01-01 PROCEDURE — 85025 COMPLETE CBC W/AUTO DIFF WBC: CPT

## 2019-01-01 PROCEDURE — 51701 INSERT BLADDER CATHETER: CPT

## 2019-01-01 RX ORDER — FAMOTIDINE 10 MG/ML
20 INJECTION INTRAVENOUS
Status: COMPLETED | OUTPATIENT
Start: 2019-01-01 | End: 2019-01-01

## 2019-01-01 RX ORDER — ACETAMINOPHEN 650 MG/1
650 SUPPOSITORY RECTAL
Status: DISCONTINUED | OUTPATIENT
Start: 2019-01-01 | End: 2019-01-01 | Stop reason: HOSPADM

## 2019-01-01 RX ORDER — ACETAMINOPHEN 650 MG/1
650 SUPPOSITORY RECTAL
Qty: 10 SUPPOSITORY | Refills: 0 | Status: SHIPPED | OUTPATIENT
Start: 2019-01-01

## 2019-01-01 RX ORDER — SODIUM CHLORIDE, SODIUM LACTATE, POTASSIUM CHLORIDE, CALCIUM CHLORIDE 600; 310; 30; 20 MG/100ML; MG/100ML; MG/100ML; MG/100ML
100 INJECTION, SOLUTION INTRAVENOUS CONTINUOUS
Status: DISPENSED | OUTPATIENT
Start: 2019-01-01 | End: 2019-01-01

## 2019-01-01 RX ORDER — NITROFURANTOIN 25; 75 MG/1; MG/1
100 CAPSULE ORAL 2 TIMES DAILY
Qty: 10 CAP | Refills: 0 | Status: SHIPPED | OUTPATIENT
Start: 2019-01-01 | End: 2019-01-01

## 2019-01-01 RX ORDER — LORAZEPAM 2 MG/ML
1 CONCENTRATE ORAL
Qty: 30 ML | Refills: 0 | Status: SHIPPED | OUTPATIENT
Start: 2019-01-01

## 2019-01-01 RX ORDER — LORAZEPAM 2 MG/ML
1 INJECTION INTRAMUSCULAR
Status: DISCONTINUED | OUTPATIENT
Start: 2019-01-01 | End: 2019-01-01 | Stop reason: HOSPADM

## 2019-01-01 RX ORDER — ONDANSETRON 2 MG/ML
4 INJECTION INTRAMUSCULAR; INTRAVENOUS
Status: COMPLETED | OUTPATIENT
Start: 2019-01-01 | End: 2019-01-01

## 2019-01-01 RX ORDER — LISINOPRIL 10 MG/1
10 TABLET ORAL DAILY
Qty: 90 TAB | Refills: 3 | Status: SHIPPED | OUTPATIENT
Start: 2019-01-01 | End: 2019-01-01

## 2019-01-01 RX ORDER — ONDANSETRON 2 MG/ML
4 INJECTION INTRAMUSCULAR; INTRAVENOUS
Status: DISCONTINUED | OUTPATIENT
Start: 2019-01-01 | End: 2019-01-01 | Stop reason: HOSPADM

## 2019-01-01 RX ORDER — SODIUM CHLORIDE 9 MG/ML
150 INJECTION, SOLUTION INTRAVENOUS CONTINUOUS
Status: DISCONTINUED | OUTPATIENT
Start: 2019-01-01 | End: 2019-01-01 | Stop reason: HOSPADM

## 2019-01-01 RX ORDER — LORAZEPAM 2 MG/ML
0.5 INJECTION INTRAMUSCULAR
Status: COMPLETED | OUTPATIENT
Start: 2019-01-01 | End: 2019-01-01

## 2019-01-01 RX ORDER — LISINOPRIL 10 MG/1
10 TABLET ORAL DAILY
Qty: 90 TAB | Refills: 3 | Status: SHIPPED | OUTPATIENT
Start: 2019-01-01 | End: 2019-01-01 | Stop reason: SDUPTHER

## 2019-01-01 RX ORDER — FACIAL-BODY WIPES
10 EACH TOPICAL DAILY
Qty: 10 SUPPOSITORY | Refills: 0 | Status: SHIPPED | OUTPATIENT
Start: 2019-01-01

## 2019-01-01 RX ORDER — CEFTRIAXONE 1 G/1
INJECTION, POWDER, FOR SOLUTION INTRAMUSCULAR; INTRAVENOUS
Status: DISPENSED
Start: 2019-01-01 | End: 2019-01-01

## 2019-01-01 RX ORDER — MORPHINE SULFATE 20 MG/ML
5-20 SOLUTION ORAL
Qty: 30 ML | Refills: 0 | Status: SHIPPED | OUTPATIENT
Start: 2019-01-01 | End: 2019-01-01

## 2019-01-01 RX ORDER — LORAZEPAM 2 MG/ML
INJECTION INTRAMUSCULAR
Status: COMPLETED
Start: 2019-01-01 | End: 2019-01-01

## 2019-01-01 RX ADMIN — SODIUM CHLORIDE 150 ML/HR: 900 INJECTION, SOLUTION INTRAVENOUS at 08:31

## 2019-01-01 RX ADMIN — SODIUM CHLORIDE, SODIUM LACTATE, POTASSIUM CHLORIDE, AND CALCIUM CHLORIDE 100 ML/HR: 600; 310; 30; 20 INJECTION, SOLUTION INTRAVENOUS at 02:15

## 2019-01-01 RX ADMIN — CEFTRIAXONE 1 G: 1 INJECTION, POWDER, FOR SOLUTION INTRAMUSCULAR; INTRAVENOUS at 04:58

## 2019-01-01 RX ADMIN — LORAZEPAM 1 MG: 2 INJECTION, SOLUTION INTRAMUSCULAR; INTRAVENOUS at 07:58

## 2019-01-01 RX ADMIN — SODIUM CHLORIDE 40 MG: 9 INJECTION INTRAMUSCULAR; INTRAVENOUS; SUBCUTANEOUS at 09:21

## 2019-01-01 RX ADMIN — SODIUM CHLORIDE, SODIUM LACTATE, POTASSIUM CHLORIDE, AND CALCIUM CHLORIDE 100 ML/HR: 600; 310; 30; 20 INJECTION, SOLUTION INTRAVENOUS at 13:02

## 2019-01-01 RX ADMIN — LORAZEPAM 1 MG: 2 INJECTION, SOLUTION INTRAMUSCULAR; INTRAVENOUS at 16:47

## 2019-01-01 RX ADMIN — LORAZEPAM 0.5 MG: 2 INJECTION INTRAMUSCULAR at 01:15

## 2019-01-01 RX ADMIN — FAMOTIDINE 20 MG: 10 INJECTION, SOLUTION INTRAVENOUS at 10:00

## 2019-01-01 RX ADMIN — SODIUM CHLORIDE, SODIUM LACTATE, POTASSIUM CHLORIDE, AND CALCIUM CHLORIDE 100 ML/HR: 600; 310; 30; 20 INJECTION, SOLUTION INTRAVENOUS at 02:12

## 2019-01-01 RX ADMIN — CEFTRIAXONE 1 G: 1 INJECTION, POWDER, FOR SOLUTION INTRAMUSCULAR; INTRAVENOUS at 04:19

## 2019-01-01 RX ADMIN — SODIUM CHLORIDE 500 ML: 900 INJECTION, SOLUTION INTRAVENOUS at 21:43

## 2019-01-01 RX ADMIN — FAMOTIDINE 20 MG: 10 INJECTION, SOLUTION INTRAVENOUS at 21:44

## 2019-01-01 RX ADMIN — ONDANSETRON 4 MG: 2 INJECTION INTRAMUSCULAR; INTRAVENOUS at 21:44

## 2019-01-01 RX ADMIN — SODIUM CHLORIDE, SODIUM LACTATE, POTASSIUM CHLORIDE, AND CALCIUM CHLORIDE 100 ML/HR: 600; 310; 30; 20 INJECTION, SOLUTION INTRAVENOUS at 04:57

## 2019-01-01 RX ADMIN — CEFTRIAXONE 1 G: 1 INJECTION, POWDER, FOR SOLUTION INTRAMUSCULAR; INTRAVENOUS at 04:32

## 2019-01-01 RX ADMIN — SODIUM CHLORIDE 40 MG: 9 INJECTION INTRAMUSCULAR; INTRAVENOUS; SUBCUTANEOUS at 13:04

## 2019-01-01 RX ADMIN — LORAZEPAM 1 MG: 2 INJECTION, SOLUTION INTRAMUSCULAR; INTRAVENOUS at 08:00

## 2019-01-01 RX ADMIN — IOPAMIDOL 80 ML: 612 INJECTION, SOLUTION INTRAVENOUS at 22:48

## 2019-01-01 RX ADMIN — LORAZEPAM 1 MG: 2 INJECTION, SOLUTION INTRAMUSCULAR; INTRAVENOUS at 05:04

## 2019-03-04 NOTE — TELEPHONE ENCOUNTER
Simon Ceja from [address] [place] [address] [facility]([phone]) would like order to continue speech therapy for patient for 2 more weeks. Please advise.

## 2019-03-05 NOTE — TELEPHONE ENCOUNTER
2 pt. Identifiers confirmed. Peggy Meneses notified of below. They will be faxing over a confirmation sheet to be signed and faxed back. No other questions/concerns at this time.

## 2019-03-05 NOTE — TELEPHONE ENCOUNTER
Attempted to contact [address] at  number, no answer. Lvm for pt to return call to office at [phone]. Will continue to try to contact pt.

## 2019-03-28 NOTE — PROGRESS NOTES
This is the Subsequent Medicare Annual Wellness Exam, performed 12 months or more after the Initial AWV or the last Subsequent AWV I have reviewed the patient's medical history in detail and updated the computerized patient record. History 79 yo female here for [address] Lives in SNF due to dementia, functional decline. Continued decline in IADLs/ADLs/cognition. Has good social support. Past Medical History:  
Diagnosis Date  Aortic heart valve narrowing  Chronic kidney disease  Femoral hernia  GERD (gastroesophageal reflux disease)  Glaucoma  Hip fracture, left ([facility] [address] 2013  Hypercholesterolemia  Hypertension  Lump in the abdomen  Macular degeneration  Myocardial infarct, old  Pulmonary HTN ([facility] [address]  Unstable chest pain due to insufficient blood supply to heart (HCC) Past Surgical History:  
Procedure Laterality Date  CABG, ARTERY-VEIN, TWO  1993  
 HIP ARTHROSCOPY, DX  2013 FX pinning  HX HERNIA REPAIR  04/17/2018 Robotic repair of a LIH w/ placement of mesh Current Outpatient Medications Medication Sig Dispense Refill  lisinopril (PRINIVIL, ZESTRIL) 10 mg tablet Take 10 mg by mouth daily. 1 tab by mouth every day for HTN    
 digestive enzymes tab Take 1 Tab by mouth Before breakfast, lunch, and dinner.  acetaminophen (MAPAP) 500 mg capsule Take 1 Cap by mouth every eight (8) hours as needed for Pain.  xrimtygpivyaemw-[id number] (REFRESH OPTIVE ADVANCED) 0.5-1-0.5 % drop Administer 1 Drop to right eye two (2) times a day.  carboxymethylcellulose sodium (REFRESH LIQUIGEL) 1 % dlgl 1-2 drops each eye qhs prn (Patient taking differently: 1-2 drops each eye qhs prn) 15 mL 3  
 mineral oil-hydrophil petrolat (AQUAPHOR) ointment Apply  to affected area as needed for Dry Skin. 452 g 1  
 ondansetron (ZOFRAN ODT) 4 mg disintegrating tablet Take 1 Tab by mouth every eight (8) hours as needed for Nausea.  30 Tab 0  
  Omeprazole delayed release (PRILOSEC D/R) 20 mg tablet Take 1 Tab by mouth daily. Indications: Heartburn 90 Tab 3  polyethylene glycol (MIRALAX) 17 gram/dose powder Take 17 g by mouth daily as needed. Indications: constipation 238 g 1  
 donepezil (ARICEPT) 5 mg tablet Take 1 Tab by mouth nightly. For one month. If tolerated, increase to 10 mg daily after one month (Patient taking differently: Take 10 mg by mouth nightly. For one month. If tolerated, increase to 10 mg daily after one month) 30 Tab 2  cholecalciferol (VITAMIN D3) 1,000 unit tablet Take 1 Tab by mouth daily.  metoprolol succinate (TOPROL-XL) 50 mg XL tablet Take 25 mg by mouth daily.  aspirin delayed-release 81 mg tablet Take 1 Tab by mouth daily.  calcium carbonate-vitamin D3 600 mg(1,500mg) -800 unit tab Take 1 Tab by mouth daily.  travoprost (TRAVATAN Z) 0.004 % ophthalmic solution Administer 1 Drop to both eyes every evening.  sodium chloride (DANK 128) 5 % ophthalmic ointment Administer 1 Drop to both eyes nightly.  OTHER Discontinue Ensure 1 Can 0  
 ramipril (ALTACE) 5 mg capsule Take  by mouth daily.  nitroglycerin (NITROSTAT) 0.4 mg SL tablet by SubLINGual route every five (5) minutes as needed for Chest Pain. Allergies Allergen Reactions  Alendronate Unknown (comments) Family History Problem Relation Age of Onset  Heart Attack Mother  Heart Attack Father Social History Tobacco Use  Smoking status: Former Smoker Last attempt to quit: 1976 Years since quittin.9  Smokeless tobacco: Never Used  Tobacco comment: approx 30 yrs ago Substance Use Topics  Alcohol use: No  
  Comment: occasionally Patient Active Problem List  
Diagnosis Code  Late onset Alzheimer's disease without behavioral disturbance G30.1, F02.80  Essential hypertension I10  
 Aortic stenosis, severe I35.0  Gastroesophageal reflux disease without esophagitis K21.9  Left inguinal hernia K40.90  Weight loss R63.4 Depression Risk Factor Screening:  
 
3 most recent PHQ Screens 3/28/2019 Little interest or pleasure in doing things Not at all Feeling down, depressed, irritable, or hopeless Not at all Total Score PHQ 2 0 Alcohol Risk Factor Screening: You do not drink alcohol or very rarely. Functional Ability and Level of Safety:  
Hearing Loss The patient wears hearing aids. Activities of Daily Living The home contains: grab bars Patient needs help with:  phone, transportation, shopping, preparing meals, laundry, housework, managing medications, managing money, dressing, bathing, hygiene, bathroom needs and walking Fall Risk Fall Risk Assessment, last 12 mths 3/28/2019 Able to walk? Yes Fall in past 12 months? No  
Fall with injury? -  
Number of falls in past 12 months - Fall Risk Score -  
 
 
Abuse Screen Patient is not abused Cognitive Screening Evaluation of Cognitive Function: 
Has your family/caregiver stated any concerns about your memory: yes Abnormal 
 
Patient Care Team  
Patient Care Team: 
Ernst Green MD as PCP - General (Internal Medicine) Gianna Stock MD (Ophthalmology) Daphne Woods MD (Surgery) Assessment/Plan Education and counseling provided: 
Are appropriate based on today's review and evaluation End-of-Life planning (with patient's consent) Screening for glaucoma Diagnoses and all orders for this visit: 
 
1. Medicare annual wellness visit, subsequent Health Maintenance Due Topic Date Due  
 DTaP/Tdap/Td series (1 - Tdap) 01/10/1949  Shingrix Vaccine Age 50> (1 of 2) 01/10/1978  GLAUCOMA SCREENING Q2Y  01/10/1993  Bone Densitometry (Dexa) Screening  01/10/1993  Influenza Age 5 to Adult  08/01/2018  MEDICARE YEARLY EXAM  11/08/2018 Reviewed ACP, Son has POA. Discussed shingles vaccine. Continue with eye exams. Otherwise, focus of care is on quality of life.

## 2019-03-28 NOTE — ACP (ADVANCE CARE PLANNING)
Advance Care Planning (ACP) Provider [facility] Date of ACP Conversation: 03/28/19 Persons included in Conversation:  patient and family Length of ACP Conversation in minutes:  <16 minutes (Non-Billable) Authorized Decision Maker (if patient is incapable of making informed decisions): This person is:  
Healthcare Agent/Medical Power of  under Advance Directive Conversation Outcomes / Follow-Up Plan:  
Reviewed existing Advance Directive

## 2019-03-28 NOTE — PATIENT INSTRUCTIONS
Medicare Wellness Visit, Female The best way to live healthy is to have a lifestyle where you eat a well-balanced diet, exercise regularly, limit alcohol use, and quit all forms of tobacco/nicotine, if applicable. Regular preventive services are another way to keep healthy. Preventive services (vaccines, screening tests, monitoring & exams) can help personalize your care plan, which helps you manage your own care. Screening tests can find health problems at the earliest stages, when they are easiest to treat. [facility] follows the current, evidence-based guidelines published by the [facility] Ayush Antonia ([facility]) when recommending preventive services for our patients. Because we follow these guidelines, sometimes recommendations change over time as research supports it. (For example, mammograms used to be recommended annually. Even though Medicare will still pay for an annual mammogram, the newer guidelines recommend a mammogram every two years for women of average risk.) Of course, you and your doctor may decide to screen more often for some diseases, based on your risk and your health status. Preventive services for you include: - Medicare offers their members a free annual wellness visit, which is time for you and your primary care provider to discuss and plan for your preventive service needs. Take advantage of this benefit every year! 
-All adults over the age of 72 should receive the recommended pneumonia vaccines. Current USPSTF guidelines recommend a series of two vaccines for the best pneumonia protection.  
-All adults should have a flu vaccine yearly and a tetanus vaccine every 10 years. All adults age 61 and older should receive a shingles vaccine once in their lifetime.   
-A bone mass density test is recommended when a woman turns 65 to screen for osteoporosis. This test is only recommended one time, as a screening. Some providers will use this same test as a disease monitoring tool if you already have osteoporosis. -All adults age 38-68 who are overweight should have a diabetes screening test once every three years.  
-Other screening tests and preventive services for persons with diabetes include: an eye exam to screen for diabetic retinopathy, a kidney function test, a foot exam, and stricter control over your cholesterol.  
-Cardiovascular screening for adults with routine risk involves an electrocardiogram (ECG) at intervals determined by your doctor.  
-Colorectal cancer screenings should be done for adults age 54-65 with no increased risk factors for colorectal cancer. There are a number of acceptable methods of screening for this type of cancer. Each test has its own benefits and drawbacks. Discuss with your doctor what is most appropriate for you during your annual wellness visit. The different tests include: colonoscopy (considered the best screening method), a fecal occult blood test, a fecal DNA test, and sigmoidoscopy. -Breast cancer screenings are recommended every other year for women of normal risk, age 54-69. 
-Cervical cancer screenings for women over age 72 are only recommended with certain risk factors.  
-All adults born between [facility] should be screened once for Hepatitis C. Here is a list of your current Health Maintenance items (your personalized list of preventive services) with a due date: 
Health Maintenance Due Topic Date Due  
 DTaP/Tdap/Td  (1 - Tdap) 01[birth year]10/1949  Shingles Vaccine (1 of 2) 01/10/1978  Glaucoma Screening   01/10/1993  Bone Mineral Density   01/10/1993  Flu Vaccine  08/01/2018 Stephy Annual Well Visit  11/08/2018 Memantine (By mouth) Memantine (me-MAN-teen) Treats dementia associated with Alzheimer disease. Brand Name(s): Namenda, Namenda Titration Pack, Namenda XR, Namenda XR Titration Pack There may be other brand names for this medicine. When This Medicine Should Not Be Used: You should not use this medicine if you have had an allergic reaction to memantine. How to Use This Medicine:  
Long Acting Capsule, Liquid, Tablet · Take your medicine as directed. Your dose may need to be changed several times to find what works best for you. Most people need to wait at least one week between dose changes. · You may take this medicine with or without food. · Measure the oral liquid medicine with a marked measuring spoon, oral syringe, or medicine cup. · Swallow the extended-release capsule whole. Do not crush, break, or chew it. · If you cannot swallow the extended-release capsule, you may open it and pour the medicine into a small amount of soft food such as pudding, yogurt, or applesauce. Stir this mixture well and swallow it without chewing. · For the liquid and extended-release capsule form: Read and follow the patient instructions that come with this medicine. Talk to your doctor or pharmacist if you have any questions. If a dose is missed: · Take a dose as soon as you remember. If it is almost time for your next dose, wait until then and take a regular dose. Do not take extra medicine to make up for a missed dose. How to Store and Dispose of This Medicine: · Store the medicine in a closed container at room temperature, away from heat, moisture, and direct light. · Ask your pharmacist, doctor, or health caregiver about the best way to dispose of any outdated medicine or medicine no longer needed. · Keep all medicine out of the reach of children. Never share your medicine with anyone. Drugs and Foods to Avoid: Ask your doctor or pharmacist before using any other medicine, including over-the-counter medicines, vitamins, and herbal products.  
· Make sure your doctor knows if you are also using amantadine (Symmetrel®), cimetidine (Tagamet®), ketamine (Marylene Purser), metformin (Arlene Pancoast), quinidine (Nghia Beti), or ranitidine (Zantac®). · Tell your doctor if you are also using a diuretic or \"water pill\" (such as acetazolamide, hydrochlorothiazide [HCTZ], methazolamide, triamterene, Diamox®, Dyazide®, Dyrenium®, Maxzide®, or Neptazane®) or an antacid or laxative that contains sodium bicarbonate, baking soda, or bicarbonate of soda (such as [place]®). · Tell your doctor if you smoke or if you are using a stop-smoking aid that contains nicotine (such as Nicoderm®, Nicorette®, or Nicotrol®) or a cold or cough medicine that contains dextromethorphan (DayQuil®, NyQuil®, Robitussin® DM, or TheraFlu®). Warnings While Using This Medicine: · Make sure your doctor knows if you are pregnant or breastfeeding, or if you have kidney disease, liver disease, bladder problems or difficulty with urination, or epilepsy or seizures. · Check with your doctor right away if you get a urinary tract infection. This includes any infection in your bladder or kidneys. Your dose of this medicine might need to be changed while you have an infection. · Your doctor will check your progress and the effects of this medicine at regular visits. Keep all appointments. Possible Side Effects While Using This Medicine:  
Call your doctor right away if you notice any of these side effects: · Allergic reaction: Itching or hives, swelling in your face or hands, swelling or tingling in your mouth or throat, chest tightness, trouble breathing · Change in how much or how often you urinate. · Chest pain. · Lightheadedness, dizziness, or fainting. · Seeing or hearing things that are not there. · Severe sleepiness, restlessness, or confusion. · Sudden or severe headache. If you notice these less serious side effects, talk with your doctor: · Back pain. · Constipation, diarrhea, vomiting, or stomach pain. · Feeling aggressive or depressed. · Mild headache. · Tiredness or weakness. · Weight gain. If you notice other side effects that you think are caused by this medicine, tell your doctor. Call your doctor for medical advice about side effects. You may report side effects to FDA at 0-948-CBK-5506 © 2017 [address] Information is for End User's use only and may not be sold, redistributed or otherwise used for commercial purposes. The above information is an  only. It is not intended as medical advice for individual conditions or treatments. Talk to your doctor, nurse or pharmacist before following any medical regimen to see if it is safe and effective for you.

## 2019-03-28 NOTE — PROGRESS NOTES
Rm:17 Chief Complaint Patient presents with [facility] Annual Wellness Visit Depression Screening: 
3 most recent [facility] Screens 3/28/2019 10/30/2018 7/18/2018 6/21/2018 2/14/2018 8/7/2017 Little interest or pleasure in doing things Not at all Not at all Not at all Not at all Not at all Not at all Feeling down, depressed, irritable, or hopeless Not at all Not at all Not at all Not at all Not at all Not at all Total Score PHQ 2 0 0 0 0 0 0 Learning Assessment: 
Learning Assessment 8/7/2017 PRIMARY LEARNER Patient HIGHEST LEVEL OF EDUCATION - PRIMARY LEARNER  SOME COLLEGE PRIMARY LANGUAGE ENGLISH  
LEARNER PREFERENCE PRIMARY READING  
ANSWERED BY Mars Garcia RELATIONSHIP SELF Abuse Screening: 
Abuse Screening Questionnaire 3/28/2019 8/7/2017 Do you ever feel afraid of your partner? Hugo Huger Are you in a relationship with someone who physically or mentally threatens you? Hugo Huger Is it safe for you to go home? Dwight Cárdenas Health Maintenance reviewed and discussed per provider: yes Coordination of Care: 1. Have you been to the ER, urgent care clinic since your last visit? Hospitalized since your last visit? no 
 
2. Have you seen or consulted any other health care providers outside of the [address] [place] [address] since your last visit? Include any pap smears or colon screening.  no

## 2019-03-28 NOTE — PROGRESS NOTES
HISTORY OF PRESENT ILLNESS Ashly Kwok is a 80 y.o. female. 79 yo female here for [address], f/u of dementia. Has been receiving ST - may need new referral . Found helpful. Mood stable but some increased confusion over past 2 weeks. She otherwise feels fine. BP remains controlle; on low side. Is taking metoprolol and lisinopril. Has hold parameters for SNF. Review of Systems Unable to perform ROS: Dementia Physical Exam  
Constitutional: She appears well-developed and well-nourished. No distress. /53 (BP 1 Location: Left arm, BP Patient Position: Sitting)   Pulse 68   Temp 96 °F (35.6 °C) (Oral)   Resp 16   Ht 5' 1\" (1.549 m)   Wt 84 lb (38.1 kg)   SpO2 99%   BMI 15.87 kg/m² Eyes: EOM are normal. Right eye exhibits no discharge. Left eye exhibits no discharge. No scleral icterus. Neck: Neck supple. Cardiovascular: Normal rate, regular rhythm and normal heart sounds. Exam reveals no gallop and no friction rub. No murmur heard. Pulmonary/Chest: Effort normal and breath sounds normal. No respiratory distress. She has no wheezes. She has no rales. Musculoskeletal: She exhibits no edema or tenderness. Lymphadenopathy:  
  She has no cervical adenopathy. Neurological: She is alert. She exhibits normal muscle tone. Skin: Skin is warm and dry. Psychiatric: She has a normal mood and affect. Lab Results Component Value Date/Time  Sodium 133 (L) 06/21/2018 02:14 PM  
 Potassium 4.3 06/21/2018 02:14 PM  
 Chloride 95 (L) 06/21/2018 02:14 PM  
 CO2 27 06/21/2018 02:14 PM  
 Anion gap 11 06/21/2018 02:14 PM  
 Glucose 135 (H) 06/21/2018 02:14 PM  
 BUN 51 (H) 06/21/2018 02:14 PM  
 Creatinine 1.19 06/21/2018 02:14 PM  
 BUN/Creatinine ratio 43 (H) 06/21/2018 02:14 PM  
 GFR est AA 52 (L) 06/21/2018 02:14 PM  
 GFR est non-AA 43 (L) 06/21/2018 02:14 PM  
 Calcium 9.8 06/21/2018 02:14 PM  
 Bilirubin, total 0.6 06/21/2018 02:14 PM  
 AST (SGOT) 20 06/21/2018 02:14 PM  
 Alk. phosphatase 90 06/21/2018 02:14 PM  
 Protein, total 7.5 06/21/2018 02:14 PM  
 Albumin 4.0 06/21/2018 02:14 PM  
 Globulin 3.5 06/21/2018 02:14 PM  
 A-G Ratio 1.1 06/21/2018 02:14 PM  
 ALT (SGPT) 16 06/21/2018 02:14 PM  
 
ASSESSMENT and PLAN 
  ICD-10-CM ICD-9-CM 1. Medicare annual wellness visit, subsequent Z00.00 V70.0 2. Essential hypertension I10 401.9 3. Late onset Alzheimer's disease without behavioral disturbance G30.1 331.0 F02.80 294.10 Will continue with current medication for now but discussed possible decrease in lisinopril or metoprolol. Son will d/w her cardiologist as well Discussed adding namenda given decline. Will hold on doing so until son and daughter-in-law return from overseas travel. Can hold vitamin D as she is getting this in calcium supplement.

## 2019-04-08 NOTE — TELEPHONE ENCOUNTER
Requested Prescriptions     Pending Prescriptions Disp Refills    lisinopril (PRINIVIL, ZESTRIL) 10 mg tablet       Sig: Take 1 Tab by mouth daily.  1 tab by mouth every day for HTN

## 2019-05-07 NOTE — TELEPHONE ENCOUNTER
Requested Prescriptions     Pending Prescriptions Disp Refills    lisinopril (PRINIVIL, ZESTRIL) 10 mg tablet 90 Tab 3     Sig: Take 1 Tab by mouth daily.  1 tab by mouth every day for HTN

## 2019-05-15 NOTE — PROGRESS NOTES
Her urine is concerning for a UTI. I have ordered macrobid for her pending her culture results. This was printed.  Please fax to facility as pharmacy on file is mail order

## 2019-05-27 NOTE — DISCHARGE INSTRUCTIONS
Patient Education        Fainting: Care Instructions  Your Care Instructions    When you faint, or pass out, you lose consciousness for a short time. A brief drop in blood flow to the brain often causes it. When you fall or lie down, more blood flows to your brain and you regain consciousness. Emotional stress, pain, or overheating--especially if you have been standing--can make you faint. In these cases, fainting is usually not serious. But fainting can be a sign of a more serious problem. Your doctor may want you to have more tests to rule out other causes. The treatment you need depends on the reason why you fainted. The doctor has checked you carefully, but problems can develop later. If you notice any problems or new symptoms, get medical treatment right away. Follow-up care is a key part of your treatment and safety. Be sure to make and go to all appointments, and call your doctor if you are having problems. It's also a good idea to know your test results and keep a list of the medicines you take. How can you care for yourself at home? · Drink plenty of fluids to prevent dehydration. If you have kidney, heart, or liver disease and have to limit fluids, talk with your doctor before you increase your fluid intake. When should you call for help? Call 911 anytime you think you may need emergency care. For example, call if:    · You have symptoms of a heart problem. These may include:  ? Chest pain or pressure. ? Severe trouble breathing. ? A fast or irregular heartbeat. ? Lightheadedness or sudden weakness. ? Coughing up pink, foamy mucus. ? Passing out. After you call 911, the  may tell you to chew 1 adult-strength or 2 to 4 low-dose aspirin. Wait for an ambulance. Do not try to drive yourself.     · You have symptoms of a stroke. These may include:  ? Sudden numbness, tingling, weakness, or loss of movement in your face, arm, or leg, especially on only one side of your body.   ? Sudden vision changes. ? Sudden trouble speaking. ? Sudden confusion or trouble understanding simple statements. ? Sudden problems with walking or balance. ? A sudden, severe headache that is different from past headaches.     · You passed out (lost consciousness) again.    Watch closely for changes in your health, and be sure to contact your doctor if:    · You do not get better as expected. Where can you learn more? Go to http://anneliese-natalio.info/. Enter P391 in the search box to learn more about \"Fainting: Care Instructions. \"  Current as of: September 23, 2018  Content Version: 11.9  © 9707-0513 [employer], [place]. Care instructions adapted under license by [employer] (which disclaims liability or warranty for this information). If you have questions about a medical condition or this instruction, always ask your healthcare professional. [address] any warranty or liability for your use of this information.

## 2019-05-27 NOTE — ED PROVIDER NOTES
EMERGENCY DEPARTMENT HISTORY AND PHYSICAL EXAM 
 
 
Date: 5/27/2019 Patient Name: Laverne Miles History of Presenting Illness Chief Complaint Patient presents with  Altered mental status History Provided By: Patient HPI/Chief Complaint: (Context):who presents with chief complaint of unable to wake up. .  Patient is from [facility]. Patient usually is early riser but did not wake up this morning and needed to bring to the hospital for evaluation. Son is a good historian and so is patient in giving me all the history Patient denies any fever chills vomiting diarrhea chest pain shortness of breath focal arm or leg weakness patient denies any back pain denies any numbness denies any weakness in any of the extremities. No headache no blurry vision Patient is completely normal state of health today and was completely normal yesterday as well. 
 
----- Patient's urinalysis has resulted at 9:06 AM with no abnormality Patient's CBC is nonspecific Patient's coags are normal 
Patient's head CT is been resulted with no acute abnormality with nonspecific white matter change Patient's chest x-ray with no acute process that can be appreciated on the chest x-ray. Patient's triage note has been reviewed Patient's vitals remained stable with slight hypertension that could be appreciated 185, 53 normal pulse ox 100% room air Patient remains with a heart rate of 60 to 58 bpm 
Patient's temperature is normal 
Patient was found to be unresponsive history of dementia in the triage note Patient's allergies are appreciated Patient's past medical history of hypertension history hip fracture GERD glaucoma appreciated Surgical history of arthroscopy/CABG/hernia appreciated Patient is a former smoker no alcohol use PCP: Chidi Pearce MD 
 
Current Facility-Administered Medications Medication Dose Route Frequency Provider Last Rate Last Dose  0.9% sodium chloride infusion  150 mL/hr IntraVENous CONTINUOUS Nanette Metcalf  mL/hr at 05/27/19 0831 150 mL/hr at 05/27/19 0831 Current Outpatient Medications Medication Sig Dispense Refill  lisinopril (PRINIVIL, ZESTRIL) 10 mg tablet Take 1 Tab by mouth daily. 1 tab by mouth every day for HTN 90 Tab 3  
 OTHER Discontinue vitamin D3 1 Tab 0  
 sodium chloride (DANK 128) 5 % ophthalmic ointment Administer 1 Drop to both eyes nightly.  digestive enzymes tab Take 1 Tab by mouth Before breakfast, lunch, and dinner.  acetaminophen (MAPAP) 500 mg capsule Take 1 Cap by mouth every eight (8) hours as needed for Pain.  [id number] (REFRESH OPTIVE ADVANCED) 0.5-1-0.5 % drop Administer 1 Drop to right eye two (2) times a day.  OTHER Discontinue Ensure 1 Can 0  
 carboxymethylcellulose sodium (REFRESH LIQUIGEL) 1 % dlgl 1-2 drops each eye qhs prn (Patient taking differently: 1-2 drops each eye qhs prn) 15 mL 3  
 mineral oil-hydrophil petrolat (AQUAPHOR) ointment Apply  to affected area as needed for Dry Skin. 452 g 1  
 ondansetron (ZOFRAN ODT) 4 mg disintegrating tablet Take 1 Tab by mouth every eight (8) hours as needed for Nausea. 30 Tab 0  
 Omeprazole delayed release (PRILOSEC D/R) 20 mg tablet Take 1 Tab by mouth daily. Indications: Heartburn 90 Tab 3  polyethylene glycol (MIRALAX) 17 gram/dose powder Take 17 g by mouth daily as needed. Indications: constipation 238 g 1  
 donepezil (ARICEPT) 5 mg tablet Take 1 Tab by mouth nightly. For one month. If tolerated, increase to 10 mg daily after one month (Patient taking differently: Take 10 mg by mouth nightly. For one month. If tolerated, increase to 10 mg daily after one month) 30 Tab 2  cholecalciferol (VITAMIN D3) 1,000 unit tablet Take 1 Tab by mouth daily.  metoprolol succinate (TOPROL-XL) 50 mg XL tablet Take 25 mg by mouth daily.  aspirin delayed-release 81 mg tablet Take 1 Tab by mouth daily.  calcium carbonate-vitamin D3 600 mg(1,500mg) -800 unit tab Take 1 Tab by mouth daily.  travoprost (TRAVATAN Z) 0.004 % ophthalmic solution Administer 1 Drop to both eyes every evening.  nitroglycerin (NITROSTAT) 0.4 mg SL tablet by SubLINGual route every five (5) minutes as needed for Chest Pain. Past History Past Medical History: 
Past Medical History:  
Diagnosis Date  Aortic heart valve narrowing  Chronic kidney disease  Femoral hernia  GERD (gastroesophageal reflux disease)  Glaucoma  Hip fracture, left ([address] 2013  Hypercholesterolemia  Hypertension  Lump in the abdomen  Macular degeneration  Myocardial infarct, old  [facility]monary HTN [address] [address]  Unstable chest pain due to insufficient blood supply to heart (HCC) Past Surgical History: 
Past Surgical History:  
Procedure Laterality Date  CABG, ARTERY-VEIN, TWO    
 HIP ARTHROSCOPY, DX   FX pinning  HX HERNIA REPAIR  2018 Robotic repair of a LIH w/ placement of mesh Family History: 
Family History Problem Relation Age of Onset  Heart Attack Mother  Heart Attack Father Social History: 
Social History Tobacco Use  Smoking status: Former Smoker Last attempt to quit: 1976 Years since quittin.1  Smokeless tobacco: Never Used  Tobacco comment: approx 30 yrs ago Substance Use Topics  Alcohol use: No  
  Comment: occasionally  Drug use: No  
 
 
Allergies: Allergies Allergen Reactions  Alendronate Unknown (comments) Review of Systems Review of Systems Constitutional: Negative for activity change, fatigue and fever. HENT: Negative for congestion and rhinorrhea. Eyes: Negative for visual disturbance. Respiratory: Negative for shortness of breath. Cardiovascular: Negative for chest pain and palpitations. Gastrointestinal: Negative for abdominal pain, diarrhea, nausea and vomiting. Genitourinary: Negative for dysuria and hematuria. Musculoskeletal: Negative for back pain. Skin: Negative for rash. Neurological: Positive for syncope. Negative for dizziness, weakness and light-headedness. Psychiatric/Behavioral: Negative for agitation. All other systems reviewed and are negative. Physical Exam  
 
Physical Exam  
Constitutional: She appears well-developed and well-nourished. No distress. HENT:  
Head: Normocephalic and atraumatic. Right Ear: External ear normal.  
Left Ear: External ear normal.  
Nose: Nose normal.  
Mouth/Throat: Oropharynx is clear and moist.  
Eyes: Pupils are equal, round, and reactive to light. Conjunctivae and EOM are normal. No scleral icterus. Neck: Normal range of motion. Neck supple. No JVD present. No tracheal deviation present. No thyromegaly present. Cardiovascular: Normal rate and regular rhythm. Exam reveals no friction rub. Murmur heard. Holosystolic murmur appreciated, no crackles appreciated no respiratory distress in the emergency department Pulmonary/Chest: Effort normal and breath sounds normal. No stridor. She exhibits no tenderness. Abdominal: Soft. Bowel sounds are normal. She exhibits no distension. There is no tenderness. There is no rebound and no guarding. Musculoskeletal: Normal range of motion. She exhibits no edema or tenderness. Lymphadenopathy:  
  She has no cervical adenopathy. Neurological: She is alert. No cranial nerve deficit. Coordination normal.  
Skin: Skin is warm and dry. Psychiatric: She has a normal mood and affect. Her behavior is normal. Judgment and thought content normal.  
Nursing note and vitals reviewed. Medical Decision Making I am the first provider for this patient. I reviewed the vital signs, available nursing notes, past medical history, past surgical history, family history and social history. Provider Notes (Medical Decision Making): Patient with unclear etiology of not waking up this morning. Subsequently patient was arousable in route by paramedic. Patient no distress in the emergency department has no focal deficits no chest pain palpitations shortness of breath I will perform second set of troponin after initial work-up and reevaluate patient. Patient's family is very comfortable with this plan and disposition and going back to [facility] if there is no obvious pathology for patient's cause of the symptoms. Unclear if this is dysrhythmia. Currently there are no obvious signs and symptoms for it in the EKG or patient's acute lab. Vital Signs-Reviewed the patient's vital signs. Pulse Oximetry Analysis patient's pulse ox is 99% on room air, normal 
 
Cardiac Monitor: 
Rate/Rhythm: 60, sinus rhythm is appreciated EKG: Interpreted by the EP. Time of the EKG is 7:50 AM, 57 sinus bradycardia normal interval axis no sign of dysrhythmia or ischemia is appreciated on the EKG Vitals:  
 05/27/19 8766 05/27/19 5606 05/27/19 0825 05/27/19 9184 BP: 185/53  102/78 Pulse: (!) 58 (!) 56  61 Resp: 21 18  18 Temp:      
SpO2: 99% 100% 100% 98% Records Reviewed: Nursing Notes ED Course:  
 Second EKG 9:36 AM, 55 sinus bradycardia, no sign of dysrhythmia ischemia appreciated Patient second troponin is reviewed and there is no change in second troponin I discussed this information with the patient and son at bedside patient is completely comfortable no distress comfortable following up If anything changes or worsens or return to the emergency department there is no chest pain I did discuss this information with Dr. Ruano Elier is covering the practice for Dr. Carlos Zafar. Patient can be discharged back to [facility] patient's current presentation and chief complaint discussed and labs discussed.  
She will communicate and follow-up with PCP 
 
 Diagnostic Study Results Labs - Recent Results (from the past 12 hour(s)) EKG, 12 LEAD, INITIAL Collection Time: 05/27/19  7:50 AM  
Result Value Ref Range Ventricular Rate 57 BPM  
 Atrial Rate 57 BPM  
 P-R Interval 110 ms QRS Duration 72 ms Q-T Interval 392 ms QTC Calculation (Bezet) 381 ms Calculated P Axis 31 degrees Calculated R Axis 61 degrees Calculated T Axis 60 degrees Diagnosis Sinus bradycardia with short MD Otherwise normal ECG When compared with ECG of 17-APR-2018 09:17, 
QRS axis shifted left Criteria for Lateral infarct are no longer present CARDIAC PANEL,(CK, CKMB & TROPONIN) Collection Time: 05/27/19  7:57 AM  
Result Value Ref Range CK 27 26 - 192 U/L  
 CK - MB <1.0 <3.6 ng/ml CK-MB Index  0.0 - 4.0 % CALCULATION NOT PERFORMED WHEN RESULT IS BELOW LINEAR LIMIT Troponin-I, QT <0.02 0.0 - 0.045 NG/ML  
CBC W/O DIFF Collection Time: 05/27/19  7:57 AM  
Result Value Ref Range WBC 7.0 4.6 - 13.2 K/uL  
 RBC 3.92 (L) 4.20 - 5.30 M/uL  
 HGB 11.5 (L) 12.0 - 16.0 g/dL HCT 35.7 35.0 - 45.0 % MCV 91.1 74.0 - 97.0 FL  
 MCH 29.3 24.0 - 34.0 PG  
 MCHC 32.2 31.0 - 37.0 g/dL  
 RDW 14.4 11.6 - 14.5 % PLATELET 230 729 - 720 K/uL MPV 10.3 9.2 - 21.7 FL  
METABOLIC PANEL, COMPREHENSIVE Collection Time: 05/27/19  7:57 AM  
Result Value Ref Range Sodium 139 136 - 145 mmol/L Potassium 4.6 3.5 - 5.5 mmol/L Chloride 103 100 - 108 mmol/L  
 CO2 28 21 - 32 mmol/L Anion gap 8 3.0 - 18 mmol/L Glucose 98 74 - 99 mg/dL BUN 24 (H) 7.0 - 18 MG/DL Creatinine 1.11 0.6 - 1.3 MG/DL  
 BUN/Creatinine ratio 22 (H) 12 - 20 GFR est AA 56 (L) >60 ml/min/1.73m2 GFR est non-AA 46 (L) >60 ml/min/1.73m2 Calcium 9.4 8.5 - 10.1 MG/DL Bilirubin, total 0.4 0.2 - 1.0 MG/DL  
 ALT (SGPT) 14 13 - 56 U/L  
 AST (SGOT) 14 (L) 15 - 37 U/L Alk. phosphatase 80 45 - 117 U/L Protein, total 7.0 6.4 - 8.2 g/dL Albumin 3.6 3.4 - 5.0 g/dL Globulin 3.4 2.0 - 4.0 g/dL A-G Ratio 1.1 0.8 - 1.7 NT-PRO BNP Collection Time: 05/27/19  7:57 AM  
Result Value Ref Range NT pro- 0 - 1,800 PG/ML  
PROTHROMBIN TIME + INR Collection Time: 05/27/19  7:57 AM  
Result Value Ref Range Prothrombin time 11.8 11.5 - 15.2 sec INR 0.9 0.8 - 1.2 PTT Collection Time: 05/27/19  7:57 AM  
Result Value Ref Range aPTT 27.9 23.0 - 36.4 SEC MAGNESIUM Collection Time: 05/27/19  7:57 AM  
Result Value Ref Range Magnesium 2.2 1.6 - 2.6 mg/dL TSH 3RD GENERATION Collection Time: 05/27/19  7:57 AM  
Result Value Ref Range TSH 1.77 0.36 - 3.74 uIU/mL URINALYSIS W/ RFLX MICROSCOPIC Collection Time: 05/27/19  8:05 AM  
Result Value Ref Range Color YELLOW Appearance CLEAR Specific gravity 1.018 1.005 - 1.030    
 pH (UA) 6.0 5.0 - 8.0 Protein NEGATIVE  NEG mg/dL Glucose NEGATIVE  NEG mg/dL Ketone NEGATIVE  NEG mg/dL Bilirubin NEGATIVE  NEG Blood NEGATIVE  NEG Urobilinogen 0.2 0.2 - 1.0 EU/dL Nitrites NEGATIVE  NEG Leukocyte Esterase NEGATIVE  NEG    
AMMONIA Collection Time: 05/27/19  9:32 AM  
Result Value Ref Range Ammonia 13 11 - 32 UMOL/L  
EKG, 12 LEAD, SUBSEQUENT Collection Time: 05/27/19  9:36 AM  
Result Value Ref Range Ventricular Rate 55 BPM  
 Atrial Rate 55 BPM  
 P-R Interval 128 ms QRS Duration 82 ms Q-T Interval 390 ms QTC Calculation (Bezet) 373 ms Calculated P Axis 45 degrees Calculated R Axis 65 degrees Calculated T Axis 62 degrees Diagnosis Sinus bradycardia Otherwise normal ECG When compared with ECG of 27-MAY-2019 07:50, No significant change was found Radiologic Studies -  
CT HEAD WO CONT Final Result No acute intracranial abnormalities. Nonspecific white matter changes, likely related to chronic small vessel  
ischemia XR CHEST SNGL V    (Results Pending) CT Results  (Last 48 hours) 05/27/19 0820  CT HEAD WO CONT Final result Impression: No acute intracranial abnormalities. Nonspecific white matter changes, likely related to chronic small vessel  
ischemia Narrative:  EXAM: CT head INDICATION: Syncope COMPARISON: None. TECHNIQUE: Axial CT imaging of the head was performed without intravenous  
contrast.One or more dose reduction techniques were used on this CT: automated  
exposure control, adjustment of the mAs and/or kVp according to patient size,  
and iterative reconstruction techniques. The specific techniques used on this CT exam have been documented in the patient's electronic medical record. Digital Imaging and Communications in Medicine (DICOM) format image data are  
available to nonaffiliated external healthcare facilities or entities on a  
secure, media free, reciprocally searchable basis with patient authorization for  
at least a 12 month period after this study. _______________ FINDINGS:  
   
BRAIN AND POSTERIOR FOSSA: The sulci, folia, ventricles and basal cisterns are  
within normal limits for the patient's age. There is no intracranial hemorrhage,  
mass effect, or midline shift. There is decreased attenuation within the  
periventricular and subcortical white matter. There are no additional areas of  
hypoattenuation. EXTRA-AXIAL SPACES AND MENINGES: There are no abnormal extra-axial fluid  
collections. CALVARIUM: Intact. SINUSES: Clear. OTHER: None.  
   
_______________ CXR Results  (Last 48 hours) None Discharge Clinical Impression: 1. Syncope and collapse Disposition: 
[facility], assisted living It should be noted that I will be the provider of record for this patient Brigitte Awan MD, Abelardo Quinn Follow-up Information Follow up With Specialties Details Why Contact Info Falguni Ferrara MD Internal Medicine Call in 1 day Follow Up From Emergency Department [address] [place] [id number] 
[phone] [facility] EMERGENCY DEPT Emergency Medicine  If symptoms worsen [address] 
[phone] Current Discharge Medication List

## 2019-05-27 NOTE — ED TRIAGE NOTES
Pt arrives via EMS from [facility]. Per staff, she was unresponsive. Only takes melatonin. On arrival pt alert, oriented to self. Hx of dementia. Per pt nursing home paperwork, she is a DNR.

## 2019-05-27 NOTE — TELEPHONE ENCOUNTER
Got paged regarding this pt at 9:47AM today, 5/27/19 from [facility] ER. Called [facility] ER  At 9:54am today and talked to Dr. Steven Mclaughlin, ER physician. He said pt is lives at [address] [facility] and was found unresponsive. EMS took pt to ER and pt was awake and talking. She is DNR. EKG, head CT, labs normal. He said if second troponin is negative, he will send home as unknown etiology of her unresponsiveness. She and her son want her to go home. Told him if everything negative, then okay to send pt home. He verbalized understanding.

## 2019-05-27 NOTE — ED NOTES
I have reviewed discharge instructions with the son. The son verbalized understanding. Pt discharged in NAD.

## 2019-07-17 PROBLEM — K44.9 HIATAL HERNIA: Status: ACTIVE | Noted: 2019-01-01

## 2019-07-17 PROBLEM — N39.0 UTI (URINARY TRACT INFECTION): Status: ACTIVE | Noted: 2019-01-01

## 2019-07-17 NOTE — PROGRESS NOTES
0230 Pt arrived to room 2211 via stretcher by ED tech alert and stable but confused NAD noted. VS stable unable to completed required documentation d/t pt being unable to answer questions. Incontinent care given x2 staff assist tolerated. Bed locked and to the lowest position call bell within reach will continue to monitor. Bedside and Verbal shift change report given to Alejandrina Pan RN (oncoming nurse) by Danilo Maldonado RN (offgoing nurse). Report included the following information SBAR, Kardex, Intake/Output, MAR and Recent Results.

## 2019-07-17 NOTE — ED TRIAGE NOTES
PT arrive via EMS with C/O abdominal and chest pain x 3 days, with nausea. Denies vomiting and diarrhea.

## 2019-07-17 NOTE — DISCHARGE INSTRUCTIONS
Patient Education        Hiatal Hernia: Care Instructions  Your Care Instructions  A hiatal hernia occurs when part of the stomach bulges into the chest cavity. A hiatal hernia may allow stomach acid and juices to back up into the esophagus (acid reflux). This can cause a feeling of burning, warmth, heat, or pain behind the breastbone. This feeling may often occur after you eat, soon after you lie down, or when you bend forward, and it may come and go. You also may have a sour taste in your mouth. These symptoms are commonly known as heartburn or reflux. But not all hiatal hernias cause symptoms. Follow-up care is a key part of your treatment and safety. Be sure to make and go to all appointments, and call your doctor if you are having problems. It's also a good idea to know your test results and keep a list of the medicines you take. How can you care for yourself at home? · Take your medicines exactly as prescribed. Call your doctor if you think you are having a problem with your medicine. · Do not take aspirin or other nonsteroidal anti-inflammatory drugs (NSAIDs), such as ibuprofen (Advil, Motrin) or naproxen (Aleve), unless your doctor says it is okay. Ask your doctor what you can take for pain. · Your doctor may recommend over-the-counter medicine. For mild or occasional indigestion, antacids such as Tums, Gaviscon, Maalox, or Mylanta may help. Your doctor also may recommend over-the-counter acid reducers, such as famotidine (Pepcid AC), cimetidine (Tagamet HB), ranitidine (Zantac 75 and Zantac 150), or omeprazole (Prilosec). Read and follow all instructions on the label. If you use these medicines often, talk with your doctor. · Change your eating habits. ? It's best to eat several small meals instead of two or three large meals. ? After you eat, wait 2 to 3 hours before you lie down. Late-night snacks aren't a good idea. ? Chocolate, mint, and alcohol can make heartburn worse.  They relax the valve between the esophagus and the stomach. ? Spicy foods, foods that have a lot of acid (like tomatoes and oranges), and coffee can make heartburn symptoms worse in some people. If your symptoms are worse after you eat a certain food, you may want to stop eating that food to see if your symptoms get better. · Do not smoke or chew tobacco.  · If you get heartburn at night, raise the head of your bed 6 to 8 inches by putting the frame on blocks or placing a foam wedge under the head of your mattress. (Adding extra pillows does not work.)  · Do not wear tight clothing around your middle. · Lose weight if you need to. Losing just 5 to 10 pounds can help. When should you call for help? Call your doctor now or seek immediate medical care if:    · You have new or worse belly pain.     · You are vomiting.    Watch closely for changes in your health, and be sure to contact your doctor if:    · You have new or worse symptoms of indigestion.     · You have trouble or pain swallowing.     · You are losing weight.     · You do not get better as expected. Where can you learn more? Go to http://anneliese-natalio.info/. Enter G732 in the search box to learn more about \"Hiatal Hernia: Care Instructions. \"  Current as of: March 27, 2018  Content Version: 11.9  © 2491-8800 [employer]. Care instructions adapted under license by [employer] (which disclaims liability or warranty for this information). If you have questions about a medical condition or this instruction, always ask your healthcare professional. [place] [address] any warranty or liability for your use of this information.

## 2019-07-17 NOTE — H&P
Internal Medicine History and Physical          Subjective     HPI: Pallavi Villa is a 80 y.o. female who presented to the ED with abdominal pain and chest pain. Patient states her symptoms began yesterday and continued to persist.  She also reports a single episode of nausea and vomiting. She has not had any recent sick contacts. Patient states she took some Tylenol about 1 hour prior to coming to the ER. She denied any headache, dizziness, shortness of breath, palpitations, constipation or diarrhea, dysuria and has no other symptoms or complaints. ED evaluation revealed large hiatal hernia on imaging and a UTI on UA. Started on Abx. GI and surgical consults called from ED.      Will admit for further evaluation and treatment      PMHx:  Past Medical History:   Diagnosis Date    Aortic heart valve narrowing     Chronic kidney disease     Femoral hernia     GERD (gastroesophageal reflux disease)     Glaucoma     Hip fracture, left ([facility] [address] 2013    Hypercholesterolemia     Hypertension     Lump in the abdomen     Macular degeneration     Myocardial infarct, old     Pulmonary HTN ([facility]     Unstable chest pain due to insufficient blood supply to heart [facility])        PSurgHx:  Past Surgical History:   Procedure Laterality Date    CABG, ARTERY-VEIN, TWO  1993    HIP ARTHROSCOPY, DX  2013    FX pinning    HX HERNIA REPAIR  04/17/2018    Robotic repair of a LIH w/ placement of mesh       SocialHx:  Social History     Socioeconomic History    Marital status:      Spouse name: Not on file    Number of children: Not on file    Years of education: Not on file    Highest education level: Not on file   Occupational History    Not on file   Social Needs    Financial resource strain: Not on file    Food insecurity:     Worry: Not on file     Inability: Not on file    Transportation needs:     Medical: Not on file     Non-medical: Not on file   Tobacco Use    Smoking status: Former Smoker Last attempt to quit: 1976     Years since quittin.2    Smokeless tobacco: Never Used    Tobacco comment: approx 30 yrs ago   Substance and Sexual Activity    Alcohol use: No     Comment: occasionally    Drug use: No    Sexual activity: Never   Lifestyle    Physical activity:     Days per week: Not on file     Minutes per session: Not on file    Stress: Not on file   Relationships    Social connections:     Talks on phone: Not on file     Gets together: Not on file     Attends Rastafari service: Not on file     Active member of club or organization: Not on file     Attends meetings of clubs or organizations: Not on file     Relationship status: Not on file    Intimate partner violence:     Fear of current or ex partner: Not on file     Emotionally abused: Not on file     Physically abused: Not on file     Forced sexual activity: Not on file   Other Topics Concern    Not on file   Social History Narrative    Not on file       Allergies: Allergies   Allergen Reactions    Alendronate Unknown (comments)       FamilyHx:  Family History   Problem Relation Age of Onset    Heart Attack Mother     Heart Attack Father        Prior to Admission Medications   Prescriptions Last Dose Informant Patient Reported? Taking? OTHER   No No   Sig: Discontinue Ensure   OTHER   No No   Sig: Discontinue vitamin D3   Omeprazole delayed release (PRILOSEC D/R) 20 mg tablet   No Yes   Sig: Take 1 Tab by mouth daily. Indications: Heartburn   acetaminophen (MAPAP) 500 mg capsule   Yes Yes   Sig: Take 1 Cap by mouth every eight (8) hours as needed for Pain. aspirin delayed-release 81 mg tablet   Yes Yes   Sig: Take 1 Tab by mouth daily. calcium carbonate-vitamin D3 600 mg(1,500mg) -800 unit tab   Yes Yes   Sig: Take 1 Tab by mouth daily. [id number] (REFRESH OPTIVE ADVANCED) 0.5-1-0.5 % drop   Yes Yes   Sig: Administer 1 Drop to right eye two (2) times a day.    carboxymethylcellulose sodium (REFRESH LIQUIGEL) 1 % dlgl   No Yes   Si-2 drops each eye qhs prn   Patient taking differently: 1-2 drops each eye qhs prn   cholecalciferol (VITAMIN D3) 1,000 unit tablet   Yes Yes   Sig: Take 1 Tab by mouth daily. digestive enzymes tab   Yes Yes   Sig: Take 1 Tab by mouth Before breakfast, lunch, and dinner. donepezil (ARICEPT) 5 mg tablet   No Yes   Sig: Take 1 Tab by mouth nightly. For one month. If tolerated, increase to 10 mg daily after one month   Patient taking differently: Take 10 mg by mouth nightly. For one month. If tolerated, increase to 10 mg daily after one month   lisinopril (PRINIVIL, ZESTRIL) 10 mg tablet   No Yes   Sig: Take 1 Tab by mouth daily. 1 tab by mouth every day for HTN   metoprolol succinate (TOPROL-XL) 50 mg XL tablet   Yes Yes   Sig: Take 25 mg by mouth daily. mineral oil-hydrophil petrolat (AQUAPHOR) ointment   No No   Sig: Apply  to affected area as needed for Dry Skin. nitroglycerin (NITROSTAT) 0.4 mg SL tablet   Yes No   Sig: by SubLINGual route every five (5) minutes as needed for Chest Pain. ondansetron (ZOFRAN ODT) 4 mg disintegrating tablet   No Yes   Sig: Take 1 Tab by mouth every eight (8) hours as needed for Nausea. polyethylene glycol (MIRALAX) 17 gram/dose powder   No Yes   Sig: Take 17 g by mouth daily as needed. Indications: constipation   sodium chloride (DANK 128) 5 % ophthalmic ointment   Yes Yes   Sig: Administer 1 Drop to both eyes nightly. travoprost (TRAVATAN Z) 0.004 % ophthalmic solution   Yes Yes   Sig: Administer 1 Drop to both eyes every evening.       Facility-Administered Medications: None       Review of Systems:  CONST: fever(-),   chills(-),   Fatigue(+),   malaise(-)  SKIN: itching(-),   rash(-)  EYES: vision - no change from baseline  ENT: ear pain(-),   nasal discharge(-),   sore throat(-),   voice change(-)  RESP: SOB(-),   cough(-),   hemoptysis(-)  CV: chest pain(-),   PND(-),   orthopnea(-),   exertional dyspnea(-), palpitations(-), syncope(-)  GI: abd pain(+),   Nausea(+),   Vomiting(+),   diarrhea(-),   melena(-),   hematemesis(-), hematochesia(-)  : dysuria(-),   frequency(-),   urgency(-),   hematuria(-)  MS: arthralgias(-),   myalgias(-)  NEURO: speech w/o change,   tremors(-),   seizures(-),   numbness(-),   dizziness(-)    Objective      Visit Vitals  /49   Pulse 60   Temp 97.9 °F (36.6 °C)   Resp 16   Ht 5' (1.524 m)   Wt 39 kg (86 lb)   SpO2 92%   BMI 16.80 kg/m²       Physical Exam:  CONST: NAD,   VSS reviewed  SKIN: rashes(-),   ulcers(-)  EYES: PERRL,   EOMI,   sclerae w/o jaundice  HENT: HEENT,   normal appearing nose and ears,   normal nasal mucosa and turbinates  NECK: supple,   no LA,   trachea is midline,   thyroid w/o goiter or palpable nodules  RESP: normal respiratory effort,   wheezes(-),   rales(-),   rhochi(-),   no consolidation on percussion  CHEST: normal axillae,   retractions(-),   use of accessory muscles(-)  CV: JVD(-),   carotid bruits(-),   RRR,   gallops(-),   murmurs(-),   edema LE(-),   abd bruits(-),   peripheral pulses normal  ABD: soft, tender(+),   distended(-),   HSM(-),   BS(+) in all quadrants,   peritoneal signs(-)  MS: NROM in all extremities,  clubbing(-),   peripheral cyanosis(-)  NEURO: speech normal, tremors(-),   CN II-XII(-),   lateralizing signs(-)  PSYCH: baseline dementia      Laboratory Studies:  CMP:   Lab Results   Component Value Date/Time     07/16/2019 09:38 PM    K 4.2 07/16/2019 09:38 PM     07/16/2019 09:38 PM    CO2 32 07/16/2019 09:38 PM    AGAP 6 07/16/2019 09:38 PM     (H) 07/16/2019 09:38 PM    BUN 25 (H) 07/16/2019 09:38 PM    CREA 1.22 07/16/2019 09:38 PM    GFRAA 50 (L) 07/16/2019 09:38 PM    GFRNA 41 (L) 07/16/2019 09:38 PM    CA 9.5 07/16/2019 09:38 PM    ALB 3.8 07/16/2019 09:38 PM    TP 6.6 07/16/2019 09:38 PM    GLOB 2.8 07/16/2019 09:38 PM    AGRAT 1.4 07/16/2019 09:38 PM    SGOT 14 (L) 07/16/2019 09:38 PM    ALT 13 07/16/2019 09:38 PM     CBC:   Lab Results   Component Value Date/Time    WBC 6.7 07/16/2019 09:38 PM    HGB 11.2 (L) 07/16/2019 09:38 PM    HCT 34.1 (L) 07/16/2019 09:38 PM     07/16/2019 09:38 PM     All Cardiac Markers in the last 24 hours:   Lab Results   Component Value Date/Time    CPK 76 07/16/2019 09:38 PM    CKMB 1.4 07/16/2019 09:38 PM    CKND1 1.8 07/16/2019 09:38 PM    TROIQ <0.02 07/16/2019 09:38 PM     Liver Panel:   Lab Results   Component Value Date/Time    ALB 3.8 07/16/2019 09:38 PM    TP 6.6 07/16/2019 09:38 PM    GLOB 2.8 07/16/2019 09:38 PM    AGRAT 1.4 07/16/2019 09:38 PM    SGOT 14 (L) 07/16/2019 09:38 PM    ALT 13 07/16/2019 09:38 PM    AP 63 07/16/2019 09:38 PM     Pancreatic Markers:   Lab Results   Component Value Date/Time    LPSE 160 07/16/2019 09:38 PM       Imaging Reviewed:  No results found. Assessment/Plan     Active Hospital Problems    Diagnosis Date Noted    UTI (urinary tract infection) 07/17/2019     Priority: 2 - Two    Hiatal hernia 07/17/2019     Hiatal hernia  NPO  GI and surgery consults  Insert NG to LIS if needed and if she starts having more nausea and vomiting. UTI (urinary tract infection)  IVF  IV Abx        - Cont acceptable home medications for chronic conditions   - DVT protocol and GI prophylaxis    I have personally reviewed all pertinent labs, films and EKGs that have officially resulted. I reviewed available electronic documentation outlining the initial presentation as well as the emergency room physician's encounter. Total time spent with patient and chart review, orders and documentation - 45min out of which more than 50% spent face-to-face with patient.     Luwana Dancer, MD  7/17/2019   4:04 AM      [facility]

## 2019-07-17 NOTE — PROGRESS NOTES
Pt has severe dementia. Discussed with son, would like GI evaluation for possible EGD, if GI does not recommend EGD then wants to proceed with hospice. GI consulted.

## 2019-07-17 NOTE — CONSULTS
General Surgery Consult    Radha Wells  Admit date: 2019    MRN: [id number]     : 1/10/1928     Age: 80 y.o. Attending Physician: Kannan Carranza MD, NICOLE      History of Present Illness:      Radha Wells is a 80 y.o. female who is known to me. I have performed robotic left inguinal hernia repair in 2018. She presented last night to the ER with a 1 day history of abdominal and chest pain that was associated with nausea and vomiting. I was called last night for a consultation for a possible gastric volvulus and the patient was admitted to the medicine service. I took most of the history from the chart and ER and floor team because when I saw the patient today she was confused and refusing any examination. After reviewing the chart, it seems that the patient she started first complaining of abdominal and chest pain. The pain was diffuse and than she developed nausea and 1 episode of vomiting. She stated to the ER that she took Tylenol prior to coming to the ER. She denied any headache, dizziness, shortness of breath, palpitations, constipation or diarrhea, dysuria and has no other symptoms or complaints. I asked the ER last night to get GI consultation to see if an EGD will help in trying to decompress the stomach and improve the patient's symptoms so we can try to avoid surgical intervention.     Patient Active Problem List    Diagnosis Date Noted    Hiatal hernia 2019    UTI (urinary tract infection) 2019    Weight loss 10/30/2018    Left inguinal hernia 2018    Late onset Alzheimer's disease without behavioral disturbance 2017    Essential hypertension 2017    Aortic stenosis, severe 2017    Gastroesophageal reflux disease without esophagitis 2017     Past Medical History:   Diagnosis Date    Aortic heart valve narrowing     Chronic kidney disease     Femoral hernia     GERD (gastroesophageal reflux disease)  Glaucoma     Hip fracture, left ([facility] [address] 2013    Hypercholesterolemia     Hypertension     Lump in the abdomen     Macular degeneration     Myocardial infarct, old     Pulmonary HTN ([facility]     Unstable chest pain due to insufficient blood supply to heart [facility])       Past Surgical History:   Procedure Laterality Date    CABG, ARTERY-VEIN, TWO      HIP ARTHROSCOPY, DX      FX pinning    HX HERNIA REPAIR  2018    Robotic repair of a LIH w/ placement of mesh      Social History     Tobacco Use    Smoking status: Former Smoker     Last attempt to quit: 1976     Years since quittin.2    Smokeless tobacco: Never Used    Tobacco comment: approx 30 yrs ago   Substance Use Topics    Alcohol use: No     Comment: occasionally      Social History     Tobacco Use   Smoking Status Former Smoker    Last attempt to quit: 1976    Years since quittin.2   Smokeless Tobacco Never Used   Tobacco Comment    approx 30 yrs ago     Family History   Problem Relation Age of Onset    Heart Attack Mother     Heart Attack Father       Current Facility-Administered Medications   Medication Dose Route Frequency    lactated Ringers infusion  100 mL/hr IntraVENous CONTINUOUS    famotidine (PF) (PEPCID) 20 mg in sodium chloride 0.9% 10 mL injection  20 mg IntraVENous DAILY    ondansetron (ZOFRAN) injection 4 mg  4 mg IntraVENous Q6H PRN    acetaminophen (TYLENOL) suppository 650 mg  650 mg Rectal Q4H PRN    LORazepam (ATIVAN) injection 1 mg  1 mg IntraVENous Q6H PRN    cefTRIAXone (ROCEPHIN) 1 g in 0.9% sodium chloride (MBP/ADV) 50 mL MBP  1 g IntraVENous Q24H    LORazepam (ATIVAN) 2 mg/mL injection          Allergies   Allergen Reactions    Alendronate Unknown (comments)        Review of Systems:  Review of systems not obtained due to patient factors.     Objective:     Visit Vitals  /55   Pulse 70   Temp 97.9 °F (36.6 °C)   Resp 16   Ht 5' (1.524 m)   Wt 39 kg (86 lb)   SpO2 93%   BMI 16.80 kg/m²       Physical Exam:      General:  in no apparent distress, uncooperative and disoriented. I was not able to do most of the physical exam because the patient was refusing. Abdomen:   I had to distract the patient to be able to do her abdominal examination. Her abdomen is flat, soft, nontender, nondistended, no masses or organomegaly. No evidence of abdominal wall hernias. Imaging and Lab Review:     CBC:   Lab Results   Component Value Date/Time    WBC 6.7 07/16/2019 09:38 PM    RBC 3.80 (L) 07/16/2019 09:38 PM    HGB 11.2 (L) 07/16/2019 09:38 PM    HCT 34.1 (L) 07/16/2019 09:38 PM    PLATELET 022 64/19/4658 09:38 PM     BMP:   Lab Results   Component Value Date/Time    Glucose 115 (H) 07/16/2019 09:38 PM    Sodium 139 07/16/2019 09:38 PM    Potassium 4.2 07/16/2019 09:38 PM    Chloride 101 07/16/2019 09:38 PM    CO2 32 07/16/2019 09:38 PM    BUN 25 (H) 07/16/2019 09:38 PM    Creatinine 1.22 07/16/2019 09:38 PM    Calcium 9.5 07/16/2019 09:38 PM     CMP:  Lab Results   Component Value Date/Time    Glucose 115 (H) 07/16/2019 09:38 PM    Sodium 139 07/16/2019 09:38 PM    Potassium 4.2 07/16/2019 09:38 PM    Chloride 101 07/16/2019 09:38 PM    CO2 32 07/16/2019 09:38 PM    BUN 25 (H) 07/16/2019 09:38 PM    Creatinine 1.22 07/16/2019 09:38 PM    Calcium 9.5 07/16/2019 09:38 PM    Anion gap 6 07/16/2019 09:38 PM    BUN/Creatinine ratio 20 07/16/2019 09:38 PM    Alk.  phosphatase 63 07/16/2019 09:38 PM    Protein, total 6.6 07/16/2019 09:38 PM    Albumin 3.8 07/16/2019 09:38 PM    Globulin 2.8 07/16/2019 09:38 PM    A-G Ratio 1.4 07/16/2019 09:38 PM       Recent Results (from the past 24 hour(s))   EKG, 12 LEAD, INITIAL    Collection Time: 07/16/19  9:36 PM   Result Value Ref Range    Ventricular Rate 73 BPM    Atrial Rate 73 BPM    P-R Interval 128 ms    QRS Duration 68 ms    Q-T Interval 348 ms    QTC Calculation (Bezet) 383 ms    Calculated P Axis 48 degrees    Calculated R Axis 78 degrees    Calculated T Axis 60 degrees    Diagnosis       Normal sinus rhythm  Left ventricular hypertrophy with repolarization abnormality  Abnormal ECG  When compared with ECG of 27-MAY-2019 09:36,  No significant change was found     CBC WITH AUTOMATED DIFF    Collection Time: 07/16/19  9:38 PM   Result Value Ref Range    WBC 6.7 4.6 - 13.2 K/uL    RBC 3.80 (L) 4.20 - 5.30 M/uL    HGB 11.2 (L) 12.0 - 16.0 g/dL    HCT 34.1 (L) 35.0 - 45.0 %    MCV 89.7 74.0 - 97.0 FL    MCH 29.5 24.0 - 34.0 PG    MCHC 32.8 31.0 - 37.0 g/dL    RDW 14.1 11.6 - 14.5 %    PLATELET 667 620 - 438 K/uL    MPV 9.6 9.2 - 11.8 FL    NEUTROPHILS 71 40 - 73 %    LYMPHOCYTES 19 (L) 21 - 52 %    MONOCYTES 9 3 - 10 %    EOSINOPHILS 1 0 - 5 %    BASOPHILS 0 0 - 2 %    ABS. NEUTROPHILS 4.8 1.8 - 8.0 K/UL    ABS. LYMPHOCYTES 1.3 0.9 - 3.6 K/UL    ABS. MONOCYTES 0.6 0.05 - 1.2 K/UL    ABS. EOSINOPHILS 0.1 0.0 - 0.4 K/UL    ABS. BASOPHILS 0.0 0.0 - 0.1 K/UL    DF AUTOMATED     METABOLIC PANEL, COMPREHENSIVE    Collection Time: 07/16/19  9:38 PM   Result Value Ref Range    Sodium 139 136 - 145 mmol/L    Potassium 4.2 3.5 - 5.5 mmol/L    Chloride 101 100 - 108 mmol/L    CO2 32 21 - 32 mmol/L    Anion gap 6 3.0 - 18 mmol/L    Glucose 115 (H) 74 - 99 mg/dL    BUN 25 (H) 7.0 - 18 MG/DL    Creatinine 1.22 0.6 - 1.3 MG/DL    BUN/Creatinine ratio 20 12 - 20      GFR est AA 50 (L) >60 ml/min/1.73m2    GFR est non-AA 41 (L) >60 ml/min/1.73m2    Calcium 9.5 8.5 - 10.1 MG/DL    Bilirubin, total 0.4 0.2 - 1.0 MG/DL    ALT (SGPT) 13 13 - 56 U/L    AST (SGOT) 14 (L) 15 - 37 U/L    Alk.  phosphatase 63 45 - 117 U/L    Protein, total 6.6 6.4 - 8.2 g/dL    Albumin 3.8 3.4 - 5.0 g/dL    Globulin 2.8 2.0 - 4.0 g/dL    A-G Ratio 1.4 0.8 - 1.7     LIPASE    Collection Time: 07/16/19  9:38 PM   Result Value Ref Range    Lipase 160 73 - 393 U/L   CARDIAC PANEL,(CK, CKMB & TROPONIN)    Collection Time: 07/16/19  9:38 PM   Result Value Ref Range    CK 76 26 - 192 U/L CK - MB 1.4 <3.6 ng/ml    CK-MB Index 1.8 0.0 - 4.0 %    Troponin-I, QT <0.02 0.0 - 0.045 NG/ML   URINALYSIS W/ RFLX MICROSCOPIC    Collection Time: 07/16/19  9:53 PM   Result Value Ref Range    Color YELLOW      Appearance CLOUDY      Specific gravity 1.018 1.005 - 1.030      pH (UA) 5.5 5.0 - 8.0      Protein NEGATIVE  NEG mg/dL    Glucose NEGATIVE  NEG mg/dL    Ketone TRACE (A) NEG mg/dL    Bilirubin NEGATIVE  NEG      Blood NEGATIVE  NEG      Urobilinogen 0.2 0.2 - 1.0 EU/dL    Nitrites NEGATIVE  NEG      Leukocyte Esterase MODERATE (A) NEG     URINE MICROSCOPIC ONLY    Collection Time: 07/16/19  9:53 PM   Result Value Ref Range    WBC 8 to 10 0 - 4 /hpf    RBC 0 0 - 5 /hpf    Epithelial cells FEW 0 - 5 /lpf    Bacteria 3+ (A) NEG /hpf       images and reports reviewed    Assessment:   Vashti Freeman is a 80 y.o. female is presenting with abdominal and chest pain that is associated with nausea and vomiting and a CT scan picture of possible gastric volvulus. Given the patient current condition (Confusion, age, multiple medical condition) and the magnitude of the surgery if it si to be performed (laparotomy) and the fact that EGD can \"untwist\" or relive the obstruction/volvulus, I believe we should start with EGD and NG tube placement and see if this will resolve the current condition. The patient is even doing well now so may be observation is an option if she does not have any symptoms today and GI team are ok with that though this could be risky if her volvulus worsen and she developed ischemia/necrosis of the stomach. Plan:     Hold on any surgical intervention for now. Of course if the patient's condition does not resolve and the GI team EGD is not successful, than will have to intervene.   GI consultation  NPO  IVF for hydration  DVT prophylaxis  Family discussion for the appropriate care that will be needed     Please call me if you have any questions (cell phone: [phone])     Signed By: Hunter Cesar MD     July 17, 2019

## 2019-07-17 NOTE — ASSESSMENT & PLAN NOTE
NPO  GI and surgery consults  Insert NG to LIS if needed and if she starts having more nausea and vomiting.

## 2019-07-17 NOTE — ED NOTES
Patient resting in stretcher. Respirations even and unlabored. Kristy Osuna., PA at bedside updating family.

## 2019-07-17 NOTE — ED NOTES
PIV established, good flash and easy flush. Blood specimen collected and sent to lab. PT tolerated well.        Medicated PT per [facility], allergies verified, will continue to monitor

## 2019-07-17 NOTE — PROGRESS NOTES
Nutrition initial assessment/Plan of care      RECOMMENDATIONS:   1. NPO: diet to be advanced when medically indicated. 2. Monitor labs, weight and PO intake  3. RD to follow     GOALS:   1. PO intake meets >75% of protein/calorie needs by 7/22  2. Weight Maintenance/promote gradual gain (1-2 lb/week) by 7/24     ASSESSMENT:   Wt status is classified as underweight per Body mass index is 16.8 kg/m². Currently NPO. Labs noted. Pt w/ elevated BUN; GFR (41). Nutrition recommendations listed. RD to follow. Nutrition Diagnoses:   Underweight related to inadequate energy intake PTA as evidenced by a BMI of 16.8 and 86% IBW    Inadequate PO intake related to nausea, vomiting and abdominal pain as evidenced by an NPO diet order. Nutrition Risk:  [] High  [x] Moderate []  Low    SUBJECTIVE/OBJECTIVE:   Pt admitted for hiatal hernia and UTI. Pt w/ baseline dementia. Noted Pt w/ N/V/ABD pain PTA and a CT scan with possible gastric volvulus. GS consulted: plan is to hold on surgery and consult GI for possible EGD. Per attending note today: son would like GI consult but if EGD not recommended, would like to proceed with hospice. Pt seen in room sleeping with no family at bedside to provide Hx. Pt appears thin but per documented weight records weight has been stable this past year ranging from 83-86 lb. Currently not meeting nutritional needs d/t NPO diet order. Will monitor diet advancement. Information Obtained from:    [x] Chart Review   [x] Patient   [] Family/Caregiver   [] Nurse/Physician   [] Interdisciplinary Meeting/Rounds      Diet: NPO  Medications: [x] Reviewed  IVF: LR @100 mL/hr    Allergies: [x] Reviewed   Encounter Diagnoses     ICD-10-CM ICD-9-CM   1. Acute cystitis without hematuria N30.00 595.0   2. Atypical chest pain R07.89 786.59   3.  Hiatal hernia K44.9 553.3     Past Medical History:   Diagnosis Date    Aortic heart valve narrowing     Chronic kidney disease     Femoral hernia     GERD (gastroesophageal reflux disease)     Glaucoma     Hip fracture, left ([facility] [address] 2013    Hypercholesterolemia     Hypertension     Lump in the abdomen     Macular degeneration     Myocardial infarct, old     Pulmonary HTN ([facility] [address] [facility]     Unstable chest pain due to insufficient blood supply to heart [facility])       Labs:    Lab Results   Component Value Date/Time    Sodium 139 07/16/2019 09:38 PM    Potassium 4.2 07/16/2019 09:38 PM    Chloride 101 07/16/2019 09:38 PM    CO2 32 07/16/2019 09:38 PM    Anion gap 6 07/16/2019 09:38 PM    Glucose 115 (H) 07/16/2019 09:38 PM    BUN 25 (H) 07/16/2019 09:38 PM    Creatinine 1.22 07/16/2019 09:38 PM    Calcium 9.5 07/16/2019 09:38 PM    Magnesium 2.2 05/27/2019 07:57 AM    Albumin 3.8 07/16/2019 09:38 PM     Anthropometrics: BMI (calculated): 16.8  Last 3 Recorded Weights in this Encounter    07/16/19 2128   Weight: 39 kg (86 lb)      Ht Readings from Last 1 Encounters:   07/16/19 5' (1.524 m)     Weight Metrics 7/16/2019 3/28/2019 10/30/2018 7/18/2018 6/21/2018 6/19/2018 4/30/2018   Weight 86 lb 84 lb 85 lb 6.4 oz 83 lb 12.8 oz 88 lb 110 lb 12.8 oz 90 lb   BMI 16.8 kg/m2 15.87 kg/m2 16.14 kg/m2 15.83 kg/m2 16.63 kg/m2 20.94 kg/m2 18.18 kg/m2       No data found. IBW: 100 lb %IBW: 86% UBW: 83-86 lb x 1 year   [] Weight Loss [] Weight Gain [x] Weight Stable x 1 yr per documented records    Estimated Nutrition Needs: [x] MSJ  [] Other:  Calories: 9505-4108 kcal Based on:   [x] Actual BW    Protein:   60-65 g Based on:   [x] Actual BW    Fluid:       1500 ml Based on:   [x] Actual BW      [x] No Cultural, Mu-ism or ethnic dietary need identified.     [] Cultural, Mu-ism and ethnic food preferences identified and addressed     Wt Status:  [] Normal (18.6 - 24.9) [x] Underweight (<18.5) [] Overweight (25 - 29.9) [] Mild Obesity (30 - 34.9)  [] Moderate Obesity (35 - 39.9) [] Morbid Obesity (40+)        Nutrition Problems Identified:   [x] Suboptimal PO intake v/s NPO  [] Food Allergies  [] Difficulty chewing/swallowing/poor dentition  [] Constipation/Diarrhea   [] Nausea/Vomiting   [] None  [] Other:     Plan:   [] Therapeutic Diet  []  Obtained/adjusted food preferences/tolerances and/or snacks options   []  Supplements added   [] Occupational therapy following for feeding techniques  []  HS snack added   []  Modify diet texture   []  Modify diet for food allergies   []  Educate patient   []  Assist with menu selection   []  Monitor PO intake on meal rounds   [x]  Continue inpatient monitoring and intervention   [x]  Participated in discharge planning/Interdisciplinary rounds/Team meetings   []  Other:     Education Needs:   [x] Not appropriate for teaching at this time due to: NPO   [] Identified and addressed    Nutrition Monitoring and Evaluation:  [x] Continue ongoing monitoring and intervention  [] Other    Demetra Saxena

## 2019-07-17 NOTE — CONSULTS
Gastroenterology Consult    Patient: Mukesh Pelayo MRN: [id number]  SSN: [id number]    YOB: 1928  Age: 80 y.o. Sex: female        Assessment:   1) Chest pain and abdominal pain, resolved  2) Vomiting x 1, resolved  3) Large hiatal hernia with upside-down stomach on CT scan  4) Possible urinary tract infection    I reviewed the images on CXR and CT scan as well as a repeat CXR this afternoon with Dr Dilshad Richard in radiology. He does not feel that there is a gastric volvulus. The stomach certainly had air and fluid in it but is moderately decompressed on repeat chest xray this afternoon. She does not have clinical signs of obstruction or acute gastric volvulus with normal vitals, normal exam, no vomiting and no pain, no leukocytosis. I contacted her son, Toy Moritz, and reviewed the clinical situation and discussed options with him. We discussed possible EGD. At this point, in the absence of acute volvulus, an EGD would potentially be diagnostic but not therapeutic. An upper GI series may be useful at better defining her anatomy but he doesn't think she would tolerate this. We discussed the possibility she has had intermittent volvulus and the option of surgery to prevent this again. He tells me that she would be against any procedures at this point and certainly would be against surgical intervention and probably would tell us that she wants to go home and he thinks hospice may be the best option and would like to discuss this further to minimize future ER visits and hospitalizations. I think it would be reasonable to allow her to drink liquids and see how she tolerates this. If she has recurrent vomiting, we can re-address NG tube or EGD with her son at that point. Plan:   1) Trial of clear liquids  2) Antiemetics as needed  3) Discuss option of hospice with the son, Toy Moritz  4) Consider option of EGD if she has recurrent vomiting. Subjective:      Mukesh Pelayo is a 80 y.o. female who is being seen for hiatal hernia. She has a history notable for memory loss, CKD, ASCAD s/p CABG and was brought to the ER from [facility] for abdominal and chest pain. She has a known hiatal hernia on imaging over the past year and her son reports that have known about her hiatal hernia for several years. She received ativan prior to my visit with her and was fairly sleepy and history is primarily taken from the chart, nursing, and her son Jeff Ion: [phone]). The pain was apparently fairly abrupt and she had 1 episode of vomiting and she was very weak prompting the ER visit. SHe has had no vomiting since that time and denies any chest or abdominal pain to me. She has apparently had no changes in bowel habits. In the ER, she was agitated and received ativan but her vitals have been unremarkable. Her labwork is notable for normal WBC, mild anemia with Hb 11.2 (unchanged from May2019), Creat 1.11 (baseline), unremarkable electrolytes, normal liver enzymes and lipase. Troponin is negative. Urinalysis shows moderate leuk esterase and 3+ bacteria with 8-10 WBC's. Chest xray shows a large hiatal hernia. CT scan shows a large hiatal hernia an upside down stomach. Surgery was consulted and GI was consulted.      Past Medical History  Past Medical History:   Diagnosis Date    Aortic heart valve narrowing     Chronic kidney disease     Femoral hernia     GERD (gastroesophageal reflux disease)     Glaucoma     Hip fracture, left ([facility] [address] 2013    Hypercholesterolemia     Hypertension     Lump in the abdomen     Macular degeneration     Myocardial infarct, old     Pulmonary HTN ([facility] [address]     Unstable chest pain due to insufficient blood supply to heart [facility])        Past Surgical History  Past Surgical History:   Procedure Laterality Date    CABG, ARTERY-VEIN, TWO  1993    HIP ARTHROSCOPY, DX  2013    FX pinning    HX HERNIA REPAIR  04/17/2018    Robotic repair of a LIH w/ placement of mesh       Family History  Family History   Problem Relation Age of Onset    Heart Attack Mother     Heart Attack Father          Social History  Social History     Socioeconomic History    Marital status:      Spouse name: Not on file    Number of children: Not on file    Years of education: Not on file    Highest education level: Not on file   Occupational History    Not on file   Social Needs    Financial resource strain: Not on file    Food insecurity:     Worry: Not on file     Inability: Not on file    Transportation needs:     Medical: Not on file     Non-medical: Not on file   Tobacco Use    Smoking status: Former Smoker     Last attempt to quit: 1976     Years since quittin.2    Smokeless tobacco: Never Used    Tobacco comment: approx 30 yrs ago   Substance and Sexual Activity    Alcohol use: No     Comment: occasionally    Drug use: No    Sexual activity: Never   Lifestyle    Physical activity:     Days per week: Not on file     Minutes per session: Not on file    Stress: Not on file   Relationships    Social connections:     Talks on phone: Not on file     Gets together: Not on file     Attends Zoroastrianism service: Not on file     Active member of club or organization: Not on file     Attends meetings of clubs or organizations: Not on file     Relationship status: Not on file    Intimate partner violence:     Fear of current or ex partner: Not on file     Emotionally abused: Not on file     Physically abused: Not on file     Forced sexual activity: Not on file   Other Topics Concern    Not on file   Social History Narrative    Not on file         Medications  Current Facility-Administered Medications   Medication Dose Route Frequency    lactated Ringers infusion  100 mL/hr IntraVENous CONTINUOUS    famotidine (PF) (PEPCID) 20 mg in sodium chloride 0.9% 10 mL injection  20 mg IntraVENous DAILY    ondansetron (ZOFRAN) injection 4 mg  4 mg IntraVENous Q6H PRN    acetaminophen (TYLENOL) suppository 650 mg  650 mg Rectal Q4H PRN    LORazepam (ATIVAN) injection 1 mg  1 mg IntraVENous Q6H PRN    cefTRIAXone (ROCEPHIN) 1 g in 0.9% sodium chloride (MBP/ADV) 50 mL MBP  1 g IntraVENous Q24H        Hospital Problems  Never Reviewed          Codes Class Noted POA    * (Principal) Hiatal hernia ICD-10-CM: K44.9  ICD-9-CM: 553.3  7/17/2019 Yes        UTI (urinary tract infection) ICD-10-CM: N39.0  ICD-9-CM: 599.0  7/17/2019 Yes            Allergies   Allergen Reactions    Alendronate Unknown (comments)       Review of Systems:  Review of systems not obtained due to patient factors. Objective:     Physical Exam:  Visit Vitals  /46   Pulse 69   Temp 97.5 °F (36.4 °C)   Resp 16   Ht 5' (1.524 m)   Wt 39 kg (86 lb)   SpO2 93%   BMI 16.80 kg/m²     General appearance: elderly female in no distress, sleeping but arousable and cooperative, no distress, appears stated age  Eyes: negative for pallor/icterus  Throat: Lips, mucosa, and tongue normal   Neck: supple, symmetrical, trachea midline, no adenopathy   Lungs: clear to auscultation bilaterally  Heart: regular rate and rhythm, S1, S2 normal, 4/6 systolic murmur at the base; sternotomy scar noted  Abdomen: soft, non-tender.  Bowel sounds normal. No masses,  no organomegaly  Extremities: extremities normal, atraumatic, no cyanosis or edema  Skin: Skin color, texture, turgor normal. No rashes or lesions  Neurologic: able to answer yes and no questions    Recent Results (from the past 24 hour(s))   EKG, 12 LEAD, INITIAL    Collection Time: 07/16/19  9:36 PM   Result Value Ref Range    Ventricular Rate 73 BPM    Atrial Rate 73 BPM    P-R Interval 128 ms    QRS Duration 68 ms    Q-T Interval 348 ms    QTC Calculation (Bezet) 383 ms    Calculated P Axis 48 degrees    Calculated R Axis 78 degrees    Calculated T Axis 60 degrees    Diagnosis       Normal sinus rhythm  Left ventricular hypertrophy with repolarization abnormality  Abnormal ECG  When compared with ECG of 27-MAY-2019 09:36,  No significant change was found  Confirmed by Joao Price ([id number]) on 7/17/2019 8:55:33 AM     CBC WITH AUTOMATED DIFF    Collection Time: 07/16/19  9:38 PM   Result Value Ref Range    WBC 6.7 4.6 - 13.2 K/uL    RBC 3.80 (L) 4.20 - 5.30 M/uL    HGB 11.2 (L) 12.0 - 16.0 g/dL    HCT 34.1 (L) 35.0 - 45.0 %    MCV 89.7 74.0 - 97.0 FL    MCH 29.5 24.0 - 34.0 PG    MCHC 32.8 31.0 - 37.0 g/dL    RDW 14.1 11.6 - 14.5 %    PLATELET 543 178 - 686 K/uL    MPV 9.6 9.2 - 11.8 FL    NEUTROPHILS 71 40 - 73 %    LYMPHOCYTES 19 (L) 21 - 52 %    MONOCYTES 9 3 - 10 %    EOSINOPHILS 1 0 - 5 %    BASOPHILS 0 0 - 2 %    ABS. NEUTROPHILS 4.8 1.8 - 8.0 K/UL    ABS. LYMPHOCYTES 1.3 0.9 - 3.6 K/UL    ABS. MONOCYTES 0.6 0.05 - 1.2 K/UL    ABS. EOSINOPHILS 0.1 0.0 - 0.4 K/UL    ABS. BASOPHILS 0.0 0.0 - 0.1 K/UL    DF AUTOMATED     METABOLIC PANEL, COMPREHENSIVE    Collection Time: 07/16/19  9:38 PM   Result Value Ref Range    Sodium 139 136 - 145 mmol/L    Potassium 4.2 3.5 - 5.5 mmol/L    Chloride 101 100 - 108 mmol/L    CO2 32 21 - 32 mmol/L    Anion gap 6 3.0 - 18 mmol/L    Glucose 115 (H) 74 - 99 mg/dL    BUN 25 (H) 7.0 - 18 MG/DL    Creatinine 1.22 0.6 - 1.3 MG/DL    BUN/Creatinine ratio 20 12 - 20      GFR est AA 50 (L) >60 ml/min/1.73m2    GFR est non-AA 41 (L) >60 ml/min/1.73m2    Calcium 9.5 8.5 - 10.1 MG/DL    Bilirubin, total 0.4 0.2 - 1.0 MG/DL    ALT (SGPT) 13 13 - 56 U/L    AST (SGOT) 14 (L) 15 - 37 U/L    Alk.  phosphatase 63 45 - 117 U/L    Protein, total 6.6 6.4 - 8.2 g/dL    Albumin 3.8 3.4 - 5.0 g/dL    Globulin 2.8 2.0 - 4.0 g/dL    A-G Ratio 1.4 0.8 - 1.7     LIPASE    Collection Time: 07/16/19  9:38 PM   Result Value Ref Range    Lipase 160 73 - 393 U/L   CARDIAC PANEL,(CK, CKMB & TROPONIN)    Collection Time: 07/16/19  9:38 PM   Result Value Ref Range    CK 76 26 - 192 U/L    CK - MB 1.4 <3.6 ng/ml    CK-MB Index 1.8 0.0 - 4.0 %    Troponin-I, QT <0.02 0.0 - 0.045 NG/ML   URINALYSIS W/ RFLX MICROSCOPIC    Collection Time: 07/16/19  9:53 PM   Result Value Ref Range    Color YELLOW      Appearance CLOUDY      Specific gravity 1.018 1.005 - 1.030      pH (UA) 5.5 5.0 - 8.0      Protein NEGATIVE  NEG mg/dL    Glucose NEGATIVE  NEG mg/dL    Ketone TRACE (A) NEG mg/dL    Bilirubin NEGATIVE  NEG      Blood NEGATIVE  NEG      Urobilinogen 0.2 0.2 - 1.0 EU/dL    Nitrites NEGATIVE  NEG      Leukocyte Esterase MODERATE (A) NEG     URINE MICROSCOPIC ONLY    Collection Time: 07/16/19  9:53 PM   Result Value Ref Range    WBC 8 to 10 0 - 4 /hpf    RBC 0 0 - 5 /hpf    Epithelial cells FEW 0 - 5 /lpf    Bacteria 3+ (A) NEG /hpf         Signed By: Cristie Bamberger, MD     July 17, 2019

## 2019-07-17 NOTE — PROGRESS NOTES
Problem: Discharge Planning  Goal: *Discharge to safe environment  Outcome: Progressing Towards Goal   Assisted living vs snf    Reason for Admission:   abd pain,n/v                RRAT Score:     16             Do you (patient/family) have any concerns for transition/discharge? no              Plan for utilizing home health:  possible     Current Advanced Directive/Advance Care Plan: On file med poa naming several people including son demetris, date 1. Transition of Care Plan:   Spoke with pt's son demetris, he states pt is confused at baseline. She resides in memory care at [facility]. She was ambulating all over facility with walker. Staff there, does her adls. She is confused, has dementia  Has poor memory . Recognizes him, his wife  and some staff at [facility]. pcp Dr Edy Guzman saw 2-3 mo ago. Address on face sheet is his address and where her mail goes. There is med poa on file in chart. Naming demetris in addition to others. He would like her to return to [facility] directly if she can still ambulate, otherwise would want tcc for snf. Will need therapy eval when appropriate. Plan return to assisted living vs snf. Patient has designated _____unable___________________ to participate in his/her discharge plan and to receive any needed information. Name: blank Bianchi  Address:  Phone number: [phone]    Care Management Interventions  PCP Verified by CM: Yes  Palliative Care Criteria Met (RRAT>21 & CHF Dx)?: No  Mode of Transport at Discharge:  Other (see comment)  Transition of Care Consult (CM Consult): Discharge Planning  Discharge Durable Medical Equipment: No  Physical Therapy Consult: Yes  Occupational Therapy Consult: Yes  Speech Therapy Consult: No  Current Support Network: Assisted Living  Confirm Follow Up Transport: Family  Plan discussed with Pt/Family/Caregiver: Yes  Discharge Location  Discharge Placement: Assisted Living

## 2019-07-17 NOTE — PROGRESS NOTES
Problem: Pressure Injury - Risk of  Goal: *Prevention of pressure injury  Description  Document Moustapha Scale and appropriate interventions in the flowsheet. Outcome: Not Progressing Towards Goal  Note:   Pressure Injury Interventions:  Sensory Interventions: Assess changes in LOC, Keep linens dry and wrinkle-free    Moisture Interventions: Absorbent underpads         Mobility Interventions: HOB 30 degrees or less    Nutrition Interventions: Document food/fluid/supplement intake    Friction and Shear Interventions: HOB 30 degrees or less                Problem: Patient Education: Go to Patient Education Activity  Goal: Patient/Family Education  Outcome: Not Progressing Towards Goal     Problem: Pain  Goal: *Control of Pain  Outcome: Not Progressing Towards Goal     Problem: Falls - Risk of  Goal: *Absence of Falls  Description  Document Rayo Fall Risk and appropriate interventions in the flowsheet.   Outcome: Not Progressing Towards Goal  Note:   Fall Risk Interventions:  Mobility Interventions: Patient to call before getting OOB         Medication Interventions: Patient to call before getting OOB, Evaluate medications/consider consulting pharmacy, Teach patient to arise slowly                   Problem: Patient Education: Go to Patient Education Activity  Goal: Patient/Family Education  Outcome: Not Progressing Towards Goal

## 2019-07-17 NOTE — PROGRESS NOTES
conducted an initial consultation and Spiritual Assessment for Jose R Torres, who is a 80 y.o.,female. Patients Primary Language is: [place]. According to the patients EMR Latter day Affiliation is: Mu-ism. The reason the Patient came to the hospital is:   Patient Active Problem List    Diagnosis Date Noted    Hiatal hernia 07/17/2019    UTI (urinary tract infection) 07/17/2019    Weight loss 10/30/2018    Left inguinal hernia 04/17/2018    Late onset Alzheimer's disease without behavioral disturbance 11/07/2017    Essential hypertension 11/07/2017    Aortic stenosis, severe 11/07/2017    Gastroesophageal reflux disease without esophagitis 11/07/2017        The  provided the following Interventions:   attempted to initiate a relationship of care and support with patient in room 2211 this morning and at other times but found patient resting soundly and unable to communicate now. No family seen at this time. Provided information about Spiritual Care Services. Offered prayer and assurance of continued prayers on patients behalf. Assessment:  Patient does not have any Confucianist/cultural needs that will affect patients preferences in health care. There are no further spiritual or Confucianist issues which require Spiritual Care Services interventions at this time. Plan:  Chaplains will continue to follow and will provide pastoral care on an as needed/requested basis    . Dilshad Man   Spiritual Care   [phone]

## 2019-07-17 NOTE — PROGRESS NOTES
0432 Received pt, very confused and agitated. Trying to get OOB. Ativan given, calmed down immediately. Bed alarm on. Hooked to O2 at 2L/min since Ativan just given. 0830 Son at bedside, MPOA copy handed to this RN, attached to pt chart. 0930 Son at bedside, pt sleeping, easily arousable. 56 Son just left, will come back around 1400. Pull-ups left in the room for pt use.    1200 Pt sleeping, easily arousable. 1229 Report given to [address] [place] [address].

## 2019-07-17 NOTE — ED PROVIDER NOTES
EMERGENCY DEPARTMENT HISTORY AND PHYSICAL EXAM    Date: 7/16/2019  Patient Name: Radha Wells    History of Presenting Illness     Chief Complaint   Patient presents with    Abdominal Pain         History Provided By: Patient and EMS    Chief Complaint: abdominal pain  Duration: 2 Days  Timing:  Gradual and Constant  Location: center of chest, entire abdomen  Quality: Aching and Sharp  Severity: Moderate  Modifying Factors: none  Associated Symptoms: nausea, vomiting      HPI: Radha Wells is a 80 y.o. female with a PMH of Hypertension, hernia, pulmonary hypertension, MI, GERD, glaucoma, macular degeneration, CKD who [place] [facility] [place] and chest pain. Patient states her symptoms began yesterday and continued to persist.  She also reports a single episode of nausea and vomiting. She has not had any recent sick contacts. Patient states she took some Tylenol about 1 hour prior to coming to the ER. She denied any headache, dizziness, shortness of breath, palpitations, constipation or diarrhea, dysuria and has no other symptoms or complaints.     PCP: Sandeep Rebolledo MD    Current Facility-Administered Medications   Medication Dose Route Frequency Provider Last Rate Last Dose    lactated Ringers infusion  100 mL/hr IntraVENous CONTINUOUS Yossi Raymond  mL/hr at 07/17/19 0457 100 mL/hr at 07/17/19 0457    famotidine (PF) (PEPCID) 20 mg in sodium chloride 0.9% 10 mL injection  20 mg IntraVENous DAILY Yossi Raymond MD   20 mg at 07/17/19 1000    ondansetron (ZOFRAN) injection 4 mg  4 mg IntraVENous Q6H PRN Yossi Raymond MD        acetaminophen (TYLENOL) suppository 650 mg  650 mg Rectal Q4H PRN Yossi Raymond MD        LORazepam (ATIVAN) injection 1 mg  1 mg IntraVENous Q6H PRN Yossi Raymond MD   1 mg at 07/17/19 0758    cefTRIAXone (ROCEPHIN) 1 g in 0.9% sodium chloride (MBP/ADV) 50 mL MBP  1 g IntraVENous Q24H Segundo MD Yossi 100 mL/hr at 19 0458 1 g at 19 0458       Past History     Past Medical History:  Past Medical History:   Diagnosis Date    Aortic heart valve narrowing     Chronic kidney disease     Femoral hernia     GERD (gastroesophageal reflux disease)     Glaucoma     Hip fracture, left ([address] 2013    Hypercholesterolemia     Hypertension     Lump in the abdomen     Macular degeneration     Myocardial infarct, old     Pulmonary HTN ([address]     Unstable chest pain due to insufficient blood supply to heart [facility])        Past Surgical History:  Past Surgical History:   Procedure Laterality Date    CABG, ARTERY-VEIN, TWO      HIP ARTHROSCOPY, DX  2013    FX pinning    HX HERNIA REPAIR  2018    Robotic repair of a LIH w/ placement of mesh       Family History:  Family History   Problem Relation Age of Onset    Heart Attack Mother     Heart Attack Father        Social History:  Social History     Tobacco Use    Smoking status: Former Smoker     Last attempt to quit: 1976     Years since quittin.2    Smokeless tobacco: Never Used    Tobacco comment: approx 30 yrs ago   Substance Use Topics    Alcohol use: No     Comment: occasionally    Drug use: No       Allergies: Allergies   Allergen Reactions    Alendronate Unknown (comments)         Review of Systems   Review of Systems   Constitutional: Negative for chills, fatigue and fever. HENT: Negative. Negative for sore throat. Eyes: Negative. Respiratory: Negative for cough and shortness of breath. Cardiovascular: Positive for chest pain. Negative for palpitations. Gastrointestinal: Positive for abdominal pain, nausea and vomiting. Genitourinary: Negative for dysuria. Musculoskeletal: Negative. Skin: Negative. Neurological: Negative for dizziness, weakness, light-headedness and headaches. Psychiatric/Behavioral: Negative. All other systems reviewed and are negative.       Physical Exam     Vitals: 07/17/19 0230 07/17/19 0501 07/17/19 0802 07/17/19 1053   BP: 110/54 128/55 152/60 134/46   Pulse: 67 70 69    Resp: 19 16 16    Temp: 97.8 °F (36.6 °C) 97.9 °F (36.6 °C) 97.5 °F (36.4 °C)    SpO2: 96% 93%     Weight:       Height:         Physical Exam   Constitutional: She is oriented to person, place, and time. She appears well-developed and well-nourished. She appears distressed. Mild distress     HENT:   Head: Normocephalic and atraumatic. Mouth/Throat: Oropharynx is clear and moist.   Eyes: Conjunctivae are normal. No scleral icterus. Neck: Neck supple. No JVD present. No tracheal deviation present. Cardiovascular: Normal rate and regular rhythm. Murmur heard. Pulmonary/Chest: Effort normal and breath sounds normal. No respiratory distress. She has no wheezes. She has no rales. She exhibits no tenderness. Abdominal: Soft. Bowel sounds are normal. She exhibits no distension and no mass. There is generalized tenderness. There is no rigidity, no rebound, no guarding, no CVA tenderness, no tenderness at McBurney's point and negative Peña's sign. Generalized tenderness on exam; no peritoneal signs   Musculoskeletal: Normal range of motion. Neurological: She is alert and oriented to person, place, and time. She has normal strength. Gait normal. GCS eye subscore is 4. GCS verbal subscore is 5. GCS motor subscore is 6. Skin: Skin is warm and dry. She is not diaphoretic. Psychiatric: She has a normal mood and affect. Nursing note and vitals reviewed. Diagnostic Study Results     Labs -     No results found for this or any previous visit (from the past 12 hour(s)). Radiologic Studies -   XR CHEST PA LAT   Final Result   IMPRESSION:         1. Large hiatal hernia. No acute pulmonary disease. 2. Borderline heart size with mild central vascular congestion. CT ABD PELV W CONT   Final Result   IMPRESSION:         1. Large hiatal hernia containing an upside down stomach.  While unlikely, the   possibility of organoaxial gastric volvulus is not completely excluded. 2. No bowel dilatation or acute inflammatory process. 3. Colonic diverticulosis without signs of diverticulitis. A preliminary report was provided by the radiology resident on call at the time   of the study. XR CHEST PORT   Final Result   IMPRESSION:      1. No evidence of new active pulmonary disease, chronic perihilar interstitial   changes. 2. Interval development of large hiatal gastric hernia. CT Results  (Last 48 hours)               07/16/19 2242  CT ABD PELV W CONT Final result    Impression:  IMPRESSION:           1. Large hiatal hernia containing an upside down stomach. While unlikely, the   possibility of organoaxial gastric volvulus is not completely excluded. 2. No bowel dilatation or acute inflammatory process. 3. Colonic diverticulosis without signs of diverticulitis. A preliminary report was provided by the radiology resident on call at the time   of the study. Narrative:  EXAM: CT abdomen/pelvis with contrast       INDICATION: Abdominal pain and chest pain beginning yesterday. Single episode of   nausea and vomiting. COMPARISON: None       TECHNIQUE: Helical volumetric scanning through the abdomen and pelvis was   performed intravenous contrast.  Axial, coronal and sagittal reconstructions   were created. One or more dose reduction techniques were used on this CT:   automated exposure control, adjustment of the mAs and/or kVp according to   patient size, and iterative reconstruction techniques. The specific techniques   used on this CT exam have been documented in the patient's electronic medical   record.   Digital Imaging and Communications in Medicine (DICOM) format image   data are available to nonaffiliated external healthcare facilities or entities   on a secure, media free, reciprocally searchable basis with patient   authorization for at least a 12-month period after this study. _______________       FINDINGS:       LOWER CHEST: Bilateral lower lobe compressive atelectasis is present adjacent to   a very large hiatal hernia. LIVER, BILIARY: Liver is normal. No biliary dilation. Gallbladder is   unremarkable. PANCREAS: Normal.       SPLEEN: Normal.       ADRENALS: Normal.       KIDNEYS: Normal.       LYMPH NODES: No enlarged lymph nodes. GASTROINTESTINAL TRACT: A very large hiatal hernia is present containing gastric   fundus, proximal gastric body, remainder gastric body is below the diaphragm,   gastric antrum extending up above the diaphragm to the right in the pylorus and   duodenal bulb are at or just above the orifice of the hernia. The stomach is   upside down, with the gastric fundus remaining to the left of midline and   remainder of the stomach to the right of midline. Overall, the stomach is   moderately distended, mostly with fluid above the diaphragm, air/gas below the   diaphragm. No significant gastric wall thickening and no adjacent inflammatory   fat stranding. Extensive colonic diverticulosis is present, mostly left-sided, without evidence   of diverticulitis. No bowel dilatation or wall thickening. PELVIC ORGANS: Atrophic uterus, versus partial hysterectomy. Urinary bladder   appears normal.       VASCULATURE: Moderate calcific atherosclerosis is present. No AAA. BONES: No suspicious osseous lesions. Right hip nail in situ. Moderate   compression deformity of L2, mild compression deformity of L4 of unknown age,   without adjacent fat stranding, probably chronic. OTHER: None.       _______________               CXR Results  (Last 48 hours)               07/17/19 1427  XR CHEST PA LAT Final result    Impression:  IMPRESSION:           1. Large hiatal hernia. No acute pulmonary disease. 2. Borderline heart size with mild central vascular congestion.         Narrative:  EXAM: CHEST, TWO VIEWS       CLINICAL INDICATION/HISTORY: Please evaluate for resolution of air-fluid level     > Additional: None. COMPARISON: Single view chest and CT abdomen and pelvis 7/16/2019     > Reference Exam: None. TECHNIQUE: AP and lateral views of the chest were obtained.       _______________       FINDINGS:       SUPPORT DEVICES: None. HEART AND MEDIASTINUM: Prior coronary bypass surgery. Heart remains at the   upper limits of normal. No central vascular congestion. LUNGS AND PLEURAL SPACES: There continues to be a large retrocardiac   air-containing mass consistent with hiatal hernia. No focal consolidation or   effusion. No pneumothorax. BONY THORAX AND SOFT TISSUES: No acute osseous abnormality. _______________           07/16/19 2146  XR CHEST PORT Final result    Impression:  IMPRESSION:       1. No evidence of new active pulmonary disease, chronic perihilar interstitial   changes. 2. Interval development of large hiatal gastric hernia. Narrative:  EXAM: Portable frontal view of the chest.       CLINICAL INDICATION/HISTORY: Chest pain       COMPARISON: 5/27/2019       _______________       FINDINGS:        Sternal wires and mediastinal clips are noted. Several residual transcardial   pacer wires are noted on the right side of the heart. There has been interval   development of a large intrathoracic herniation of stomach with air-fluid level   in the midline at the region of the diaphragmatic hiatus. No new lung   consolidation mass pleural effusion or pneumothorax. Mild chronic perihilar   interstitial changes. _______________                   Medical Decision Making   I am the first provider for this patient. I reviewed the vital signs, available nursing notes, past medical history, past surgical history, family history and social history. Vital Signs-Reviewed the patient's vital signs.     Records Reviewed: Nursing Notes, Old Medical Records, Previous electrocardiograms, Previous Radiology Studies and Previous Laboratory Studies     551 PM  57-year-old female who [place] the ER via [place] [facility] [place] complaining of chest pain and abdominal pain. Symptom onset 1 day ago. Associated symptoms include nausea and one episode of vomiting. Denied any associated fevers or urinary. Mild distress on exam, speaking in full sentences. We will plan on cardiac work-up and other labs and imaging to rule out acute process. Faina Woodall PA-C     12:42 AM  Chest x-ray shows large hiatal hernia. Urinalysis consistent with UTI. Patient reports significant improvement in her symptoms after being medicated with Zofran and Pepcid. Disposition:  Admitted        Follow-up Information     Follow up With Specialties Details Why [address] [facility] EMERGENCY DEPT Emergency Medicine  If symptoms worsen [address]  [phone]    Davion Smith MD Internal Medicine Call in 1 day ER follow up for UTI and hiatal hernia [address]  [facility]  [phone]            Current Discharge Medication List          Provider Notes (Medical Decision Making):     Procedures:  Procedures        Diagnosis     Clinical Impression:   1. Acute cystitis without hematuria    2. Atypical chest pain    3.  Hiatal hernia

## 2019-07-18 NOTE — PROGRESS NOTES
0700 nurse entering the unit, observe pt trying to get out of bed, pt leg was stuck in bed rail, nurse assist pt remove leg, pt as skin tears on both of her legs, nurse applied

## 2019-07-18 NOTE — PROGRESS NOTES
Progress Note      Patient: Simona Davila               Sex: female          DOA: 7/16/2019       YOB: 1928      Age:  80 y.o.        LOS:  LOS: 1 day             CHIEF COMPLAINT:  Abdominal Pain      Assessment/Plan:     Principal Problem:    Hiatal hernia (7/17/2019)    Active Problems:    Late onset Alzheimer's disease without behavioral disturbance (11/7/2017)      Gastroesophageal reflux disease without esophagitis (11/7/2017)      UTI (urinary tract infection) (7/17/2019)        PLAN:  F/u urine culture, cont rocephin for possible UTI  Appreciate GI and Gen Surg consults  Cont CLD  Hospice consulted, plan to discharge tomorrow once hospital bed is available   DVT protocol      Subjective:     Pt is sleeping. Per RN pt becomes agitated frequently. Discussed with son at bedside. Objective:     Visit Vitals  /66   Pulse 81   Temp 97.9 °F (36.6 °C)   Resp 17   Ht 5' (1.524 m)   Wt 39 kg (86 lb)   SpO2 94%   BMI 16.80 kg/m²        Physical Exam:  Ears: hearing is intact  Eyes: Icterus is not present  Lungs: clear to auscultation bilaterally  Heart: regular rate and rhythm, S1, S2 normal, no murmur, click, rub or gallop  Gastrointestinal: soft, non-tender. Bowel sounds normal. No masses,  no organomegaly  Neurological:  New Focal Deficits are not present. Confused. Not oriented. Psychiatric:  Labile mood 2/2 dementia    Lab/Data Reviewed:  CMP: No results found for: NA, K, CL, CO2, AGAP, GLU, BUN, CREA, GFRAA, GFRNA, CA, MG, PHOS, ALB, TBIL, TP, ALB, GLOB, AGRAT, SGOT, ALT, GPT  CBC: No results found for: WBC, HGB, HGBEXT, HCT, HCTEXT, PLT, PLTEXT, HGBEXT, HCTEXT, PLTEXT    Imaging Reviewed:  Xr Chest Pa Lat    Result Date: 7/17/2019  EXAM: CHEST, TWO VIEWS CLINICAL INDICATION/HISTORY: Please evaluate for resolution of air-fluid level   > Additional: None. COMPARISON: Single view chest and CT abdomen and pelvis 7/16/2019   > Reference Exam: None.  TECHNIQUE: AP and lateral views of the chest were obtained. _______________ FINDINGS: SUPPORT DEVICES: None. HEART AND MEDIASTINUM: Prior coronary bypass surgery. Heart remains at the upper limits of normal. No central vascular congestion. LUNGS AND PLEURAL SPACES: There continues to be a large retrocardiac air-containing mass consistent with hiatal hernia. No focal consolidation or effusion. No pneumothorax. BONY THORAX AND SOFT TISSUES: No acute osseous abnormality. _______________     IMPRESSION: 1. Large hiatal hernia. No acute pulmonary disease. 2. Borderline heart size with mild central vascular congestion.

## 2019-07-18 NOTE — HOSPICE
Pt/family pursuing hospice:yes   Admission dx: End Stage Cardiac Disease   Anticipated hospital d/c date: 19 July 2019   Discussion occurred with patient and/or family about preference of hospitalization once admitted to hospice: yes   Reviewed and discussed hospice philosophy and keeping the patient comfortable in their residence: yes (Document additional information below)    Narrative of events and/or assessment if applicable:  Family meeting with son to discuss Hospice criteria, philosophy, services and IDT. Son expressed his mother's wish was to not have to return to the ER any longer, so he is grateful that with hospice she will not have to do that. He verbalized understand and gratitude that patient would be kept comfortable in her place of residence.

## 2019-07-18 NOTE — PROGRESS NOTES
Assumed care of pt. Pt resting in bed. Alert and oriented to self only. IVF infusing appropriately. Periwick in place. No c/o pain. NAD. Call light within reach. Will continue to monitor. 0015- pt has pulled out periwick. Incontinence care proved. Partial bath given. Pads, gown, and linens changed. Pt tolerated well. 0215- Incontinence care proved. Partial bath given. Pads, gown, and linens changed. Periwick discontinued for now. Diaper placed on pt. Pt tolerated well. Will continue to monitor. 0430- BP reassessed by primary RN--148/77    0626- incontinence care provided. Partial bath given. Pads, diaper, and linen changed. Pt tolerated well. Will continue to monitor. 2171- Bedside shift change report given to Amrit Lehman (oncoming nurse) by Geo Segura RN (offgoing nurse). Report included the following information SBAR, Kardex, Intake/Output, MAR, Accordion and Recent Results.

## 2019-07-18 NOTE — PROGRESS NOTES
1800 pt given complete head to toe bed bath, soap and water, all linens changed, repositioned, bed alarm activated,, pfreeman rn

## 2019-07-18 NOTE — CDMP QUERY
Patient admitted with Abdominal pain, Uti,  noted to have Bmi of 16,  If possible, please document in progress notes and d/c summary if you are evaluating and/or treating any of the following:    =>Underweight  =>Cachexia  =>Failure to Thrive  =>Other explanation of clinical findings  =>Clinically Undetermined (no explanation for clinical findings)    The medical record reflects the following:     Risk Factors: Npo, abd pain, age, dementia      Clinical Indicators:  Dietary Assessment  PN 7/17  ASSESSMENT:   Wt status is classified as underweight per Body mass index is 16.8 kg/m². Currently NPO. Labs noted. Pt w/ elevated BUN; GFR (41). Nutrition recommendations listed. RD to follow.      Nutrition Diagnoses:   Underweight related to inadequate energy intake PTA as evidenced by a BMI of 16.8 and 86% IBW     Inadequate PO intake related to nausea, vomiting and abdominal pain as evidenced by an NPO diet order.      Nutrition Risk:  [] High  [x] Moderate []  Low     SUBJECTIVE/OBJECTIVE:   Pt admitted for hiatal hernia and UTI. Pt w/ baseline dementia. Noted Pt w/ N/V/ABD pain PTA and a CT scan with possible gastric volvulus. GS consulted: plan is to hold on surgery and consult GI for possible EGD. Per attending note today: son would like GI consult but if EGD not recommended, would like to proceed with hospice. Pt seen in room sleeping with no family at bedside to provide Hx. Pt appears thin but per documented weight records weight has been stable this past year ranging from 83-86 lb. Currently not meeting nutritional needs d/t NPO diet order. Will monitor diet advancement.               Treatment:   NPO: diet to be advanced when medically indicated. 2. Monitor labs, weight and PO intake  3. RD to follow NPO: diet to be advanced when medically indicated.    2. Monitor labs, weight and PO intake  3 . RD to follow     Thank you,  Hillary Mcdonald RN/CDI  [phone]

## 2019-07-18 NOTE — HOSPICE
Family meeting. Spoke with son to discuss Hospice criteria, philosophy, services and IDT. [facility] brochure and number placed on white board. Discharge plan is to return to [facility] 19 July 2019 with hospice services. Spoke to Jeri MOORE to update. Will continue to monitor. Thank you for the referral to [facility] to care for Pt and family. If there is an acute change in patient status, please call [facility] at [phone].

## 2019-07-18 NOTE — PROGRESS NOTES
Problem: Pressure Injury - Risk of  Goal: *Prevention of pressure injury  Description  Document Moustapha Scale and appropriate interventions in the flowsheet. Outcome: Progressing Towards Goal  Note:   Pressure Injury Interventions:  Sensory Interventions: Assess changes in LOC, Keep linens dry and wrinkle-free, Pressure redistribution bed/mattress (bed type)    Moisture Interventions: Absorbent underpads, Apply protective barrier, creams and emollients, Check for incontinence Q2 hours and as needed, Internal/External urinary devices, Maintain skin hydration (lotion/cream)    Activity Interventions: Pressure redistribution bed/mattress(bed type)    Mobility Interventions: Pressure redistribution bed/mattress (bed type), HOB 30 degrees or less    Nutrition Interventions: Offer support with meals,snacks and hydration, Document food/fluid/supplement intake    Friction and Shear Interventions: HOB 30 degrees or less, Foam dressings/transparent film/skin sealants                Problem: Patient Education: Go to Patient Education Activity  Goal: Patient/Family Education  Outcome: Progressing Towards Goal     Problem: Pain  Goal: *Control of Pain  Outcome: Progressing Towards Goal     Problem: Patient Education: Go to Patient Education Activity  Goal: Patient/Family Education  Outcome: Progressing Towards Goal     Problem: Falls - Risk of  Goal: *Absence of Falls  Description  Document Rayo Fall Risk and appropriate interventions in the flowsheet.   Outcome: Progressing Towards Goal  Note:   Fall Risk Interventions:  Mobility Interventions: Bed/chair exit alarm, Communicate number of staff needed for ambulation/transfer    Mentation Interventions: Adequate sleep, hydration, pain control, Bed/chair exit alarm, Door open when patient unattended, Familiar objects from home, Reorient patient    Medication Interventions: Bed/chair exit alarm, Evaluate medications/consider consulting pharmacy    Elimination Interventions: Call light in reach, Bed/chair exit alarm              Problem: Patient Education: Go to Patient Education Activity  Goal: Patient/Family Education  Outcome: Progressing Towards Goal

## 2019-07-18 NOTE — PROGRESS NOTES
Nurse assesses pt pain q2-4 every, utilizes non verbal pain scale r/t pt neuro status, pt has been resting throughout the day, during day pt impulsive and agitated, will continue to monitor and intervene accordingly,,, Marah SENIOR

## 2019-07-18 NOTE — PROGRESS NOTES
Left message for henry ,hospice nurse have hospice referral. Cami Grimm came onto unit just as I left message, she will speak with pt's son. Spoke with krys at [facility], notified pt may return with hospice. She stated that it was ok. Spoke with henry son agrees with hospice. Dme, hosp bed to be delivered 10am tomorrow. Will set up transport for 1pm tomorrow . henry will let krys know at [facility], I notified Dr Pia Pearson. Plan assisted living with hospice.

## 2019-07-18 NOTE — PROGRESS NOTES
Transport has been set up for 1pm tomorrow to go to [facility]. henry has arranged for dme to be delivered around 10am. Pt's son aware of plan, spoke with mignon at [facility] they are aware.

## 2019-07-18 NOTE — PROGRESS NOTES
Transportation at Discharge: 7/19/19    Transport Company/Representative: [address] / [employer] number: [phone]  Method of Transport: [employer] [facility] / BLS    Estimated pick-up time: 1:00P  Destination: [address] [place] [address] [employer]: Medicare / [facility] [employer]  Authorization:  No auth required    Requesting Outcomes Manager: Tila Banks, Care- ext [phone]

## 2019-07-19 PROBLEM — R62.7 FAILURE TO THRIVE IN ADULT: Status: ACTIVE | Noted: 2019-01-01

## 2019-07-19 NOTE — PROGRESS NOTES
0800  Received pt on bed, sleeping , tried to talk to us during report but her eyes are closed, was reported was given Ativan  Early this morning. Haley Cuevas 0945  Refused to eat at first but when I was  Giving  It to her mouth, she open her mouth , eat slowly, confused, knows her name. 1100  Tried to slid at the buttom part of the bed, her right foot  Hanging on the bed.    1230  Complete AM care given by 2 nurses, was trying to  fight the nurses while given a bath. Did not do good full assessment , not cooperating, but at this time  She is not in pain. She pulled the IV out before I was able to remove, so far no skin breakdown noted on her back and coccyx. Report to [place].

## 2019-07-19 NOTE — ROUTINE PROCESS
0500 Pt awake restless pulling off gown, unable to take vs, medicated with ativan as ordered   0600 Pt in bed resting quietly /86 Pulse 86.  Incontinent of urine skin care given

## 2019-07-19 NOTE — PROGRESS NOTES
Problem: Discharge Planning  Goal: *Discharge to safe environment  Outcome: resolved/met  Hospice at assisted living facility    Pcs, envelope in nurses station. Notified papo,pt's nurse  pt to transport to [facility] place at [address], spoke with carl, bed supposed to be delivered  At 10am. She will notify me by 12 if not yet delivered so transport can be changed. dnr placed in envelope, copy for transport also. Will fax and place in envelope scripts and dc summary when available. fax'd scripts and dc summary, received confirmation. Notified pt's son mr Kristine Pope. Care Management Interventions  PCP Verified by CM:  Yes  Palliative Care Criteria Met (RRAT>21 & CHF Dx)?: No  Mode of Transport at Discharge: BLS  Transition of Care Consult (CM Consult): Indira: Yes  Discharge Durable Medical Equipment: No  Physical Therapy Consult: No  Occupational Therapy Consult: No  Speech Therapy Consult: No  Current Support Network: Assisted Living  Confirm Follow Up Transport: Other (see comment)  Plan discussed with Pt/Family/Caregiver: Yes  Freedom of Choice Offered: Yes  Discharge Location  Discharge Placement: Home with hospice

## 2019-07-19 NOTE — H&P
Discharge Summary    Patient: Darrius Mott               Sex: female          DOA: 7/16/2019       YOB: 1928      Age:  80 y.o.        LOS:  LOS: 2 days                Admit Date: 7/16/2019    Discharge Date: 7/19/2019    Admission Diagnoses: Hiatal hernia [K44.9]  UTI (urinary tract infection) [N39.0]    Discharge Diagnoses:    Hospital Problems  Never Reviewed          Codes Class Noted POA    * (Principal) Hiatal hernia ICD-10-CM: K44.9  ICD-9-CM: 553.3  7/17/2019 Yes        UTI (urinary tract infection) ICD-10-CM: N39.0  ICD-9-CM: 599.0  7/17/2019 Yes        Late onset Alzheimer's disease without behavioral disturbance ICD-10-CM: G30.1, F02.80  ICD-9-CM: 331.0, 294.10  11/7/2017 Yes        Gastroesophageal reflux disease without esophagitis ICD-10-CM: K21.9  ICD-9-CM: 530.81  11/7/2017 Yes              Discharge Disposition: Hospice/Medical Facility     Discharge Condition:  Hospice    Hospital Course:  79 yo F with h/o dementia, CKD, CAD s/p CABG who presented from [facility] for abdominal pain, chest pain and vomiting. ER workup notable for large hiatal hernia and an upside down stomach. GI and General Surgery were consulted. Per GI, there were no clinical signs of obstruction or acute gastric volvulus and it was felt EGD would not be therapeutic and the son was against any surgery. Discussion held with family (son) and he elected for hospice care for his mother as he felt she wants to go home and minimize future ER visits and hospitalizations. Hospice was consulted and pt was enrolled into Hospice and was discharged to [facility] in stable condition. Of note, she finished 3 days of antibiotics for possible UTI.     Consults:    Gastroenterology and General Surgery    Labs:  Labs: Results:       Chemistry Recent Labs     07/16/19  2138   *      K 4.2      CO2 32   BUN 25*   CREA 1.22   CA 9.5   AGAP 6   BUCR 20   AP 63   TP 6.6   ALB 3.8   GLOB 2.8   AGRAT 1. 4      CBC w/Diff Recent Labs     07/16/19 2138   WBC 6.7   RBC 3.80*   HGB 11.2*   HCT 34.1*      GRANS 71   LYMPH 19*   EOS 1      Cardiac Enzymes Recent Labs     07/16/19 2138   CPK 76   CKND1 1.8      Coagulation No results for input(s): PTP, INR, APTT in the last 72 hours. No lab exists for component: INREXT    Lipid Panel No results found for: CHOL, CHOLPOCT, CHOLX, CHLST, CHOLV, 053312, HDL, LDL, LDLC, DLDLP, 145292, VLDLC, VLDL, TGLX, TRIGL, TRIGP, TGLPOCT, CHHD, CHHDX   BNP No results for input(s): BNPP in the last 72 hours. Liver Enzymes Recent Labs     07/16/19 2138   TP 6.6   ALB 3.8   AP 63   SGOT 14*      Thyroid Studies Lab Results   Component Value Date/Time    TSH 1.77 05/27/2019 07:57 AM            Significant Diagnostic Studies:   Xr Chest Pa Lat    Result Date: 7/17/2019  EXAM: CHEST, TWO VIEWS CLINICAL INDICATION/HISTORY: Please evaluate for resolution of air-fluid level   > Additional: None. COMPARISON: Single view chest and CT abdomen and pelvis 7/16/2019   > Reference Exam: None. TECHNIQUE: AP and lateral views of the chest were obtained. _______________ FINDINGS: SUPPORT DEVICES: None. HEART AND MEDIASTINUM: Prior coronary bypass surgery. Heart remains at the upper limits of normal. No central vascular congestion. LUNGS AND PLEURAL SPACES: There continues to be a large retrocardiac air-containing mass consistent with hiatal hernia. No focal consolidation or effusion. No pneumothorax. BONY THORAX AND SOFT TISSUES: No acute osseous abnormality. _______________     IMPRESSION: 1. Large hiatal hernia. No acute pulmonary disease. 2. Borderline heart size with mild central vascular congestion. Ct Abd Pelv W Cont    Result Date: 7/17/2019  EXAM: CT abdomen/pelvis with contrast INDICATION: Abdominal pain and chest pain beginning yesterday. Single episode of nausea and vomiting.  COMPARISON: None TECHNIQUE: Helical volumetric scanning through the abdomen and pelvis was performed intravenous contrast.  Axial, coronal and sagittal reconstructions were created. One or more dose reduction techniques were used on this CT: automated exposure control, adjustment of the mAs and/or kVp according to patient size, and iterative reconstruction techniques. The specific techniques used on this CT exam have been documented in the patient's electronic medical record. Digital Imaging and Communications in Medicine (DICOM) format image data are available to nonaffiliated external healthcare facilities or entities on a secure, media free, reciprocally searchable basis with patient authorization for at least a 12-month period after this study. _______________ FINDINGS: LOWER CHEST: Bilateral lower lobe compressive atelectasis is present adjacent to a very large hiatal hernia. LIVER, BILIARY: Liver is normal. No biliary dilation. Gallbladder is unremarkable. PANCREAS: Normal. SPLEEN: Normal. ADRENALS: Normal. KIDNEYS: Normal. LYMPH NODES: No enlarged lymph nodes. GASTROINTESTINAL TRACT: A very large hiatal hernia is present containing gastric fundus, proximal gastric body, remainder gastric body is below the diaphragm, gastric antrum extending up above the diaphragm to the right in the pylorus and duodenal bulb are at or just above the orifice of the hernia. The stomach is upside down, with the gastric fundus remaining to the left of midline and remainder of the stomach to the right of midline. Overall, the stomach is moderately distended, mostly with fluid above the diaphragm, air/gas below the diaphragm. No significant gastric wall thickening and no adjacent inflammatory fat stranding. Extensive colonic diverticulosis is present, mostly left-sided, without evidence of diverticulitis. No bowel dilatation or wall thickening. PELVIC ORGANS: Atrophic uterus, versus partial hysterectomy. Urinary bladder appears normal. VASCULATURE: Moderate calcific atherosclerosis is present. No AAA.  BONES: No suspicious osseous lesions. Right hip nail in situ. Moderate compression deformity of L2, mild compression deformity of L4 of unknown age, without adjacent fat stranding, probably chronic. OTHER: None. _______________     IMPRESSION: 1. Large hiatal hernia containing an upside down stomach. While unlikely, the possibility of organoaxial gastric volvulus is not completely excluded. 2. No bowel dilatation or acute inflammatory process. 3. Colonic diverticulosis without signs of diverticulitis. A preliminary report was provided by the radiology resident on call at the time of the study. Xr Chest Port    Result Date: 7/17/2019  EXAM: Portable frontal view of the chest. CLINICAL INDICATION/HISTORY: Chest pain COMPARISON: 5/27/2019 _______________ FINDINGS: Sternal wires and mediastinal clips are noted. Several residual transcardial pacer wires are noted on the right side of the heart. There has been interval development of a large intrathoracic herniation of stomach with air-fluid level in the midline at the region of the diaphragmatic hiatus. No new lung consolidation mass pleural effusion or pneumothorax. Mild chronic perihilar interstitial changes. _______________     IMPRESSION: 1. No evidence of new active pulmonary disease, chronic perihilar interstitial changes. 2. Interval development of large hiatal gastric hernia. Discharge Medications:     Current Discharge Medication List      START taking these medications    Details   acetaminophen (TYLENOL) 650 mg suppository Insert 1 Suppository into rectum every four (4) hours as needed for Fever. Qty: 10 Suppository, Refills: 0      bisacodyl (DULCOLAX, BISACODYL,) 10 mg suppository Insert 10 mg into rectum daily. Qty: 10 Suppository, Refills: 0      morphine (ROXANOL) 100 mg/5 mL (20 mg/mL) concentrated solution Take 0.25-1 mL by mouth every three (3) hours as needed for Pain (for shortness of breath or pain) for up to 7 days.  Max Daily Amount: 160 mg.  Renée Render: 30 mL, Refills: 0    Associated Diagnoses: Late onset Alzheimer's disease without behavioral disturbance      LORazepam (INTENSOL) 2 mg/mL concentrated solution Take 0.5 mL by mouth every three (3) hours as needed for Agitation or Anxiety. Max Daily Amount: 8 mg. Qty: 30 mL, Refills: 0    Associated Diagnoses: Late onset Alzheimer's disease without behavioral disturbance         CONTINUE these medications which have NOT CHANGED    Details   sodium chloride (DANK 128) 5 % ophthalmic ointment Administer 1 Drop to both eyes nightly. digestive enzymes tab Take 1 Tab by mouth Before breakfast, lunch, and dinner. acetaminophen (MAPAP) 500 mg capsule Take 1 Cap by mouth every eight (8) hours as needed for Pain. [id number] (REFRESH OPTIVE ADVANCED) 0.5-1-0.5 % drop Administer 1 Drop to right eye two (2) times a day. carboxymethylcellulose sodium (REFRESH LIQUIGEL) 1 % dlgl 1-2 drops each eye qhs prn  Qty: 15 mL, Refills: 3      ondansetron (ZOFRAN ODT) 4 mg disintegrating tablet Take 1 Tab by mouth every eight (8) hours as needed for Nausea. Qty: 30 Tab, Refills: 0      Omeprazole delayed release (PRILOSEC D/R) 20 mg tablet Take 1 Tab by mouth daily. Indications: Heartburn  Qty: 90 Tab, Refills: 3      polyethylene glycol (MIRALAX) 17 gram/dose powder Take 17 g by mouth daily as needed. Indications: constipation  Qty: 238 g, Refills: 1      donepezil (ARICEPT) 5 mg tablet Take 1 Tab by mouth nightly. For one month. If tolerated, increase to 10 mg daily after one month  Qty: 30 Tab, Refills: 2      cholecalciferol (VITAMIN D3) 1,000 unit tablet Take 1 Tab by mouth daily. calcium carbonate-vitamin D3 600 mg(1,500mg) -800 unit tab Take 1 Tab by mouth daily.       travoprost (TRAVATAN Z) 0.004 % ophthalmic solution Administer 1 Drop to both eyes every evening.      !! OTHER Discontinue vitamin D3  Qty: 1 Tab, Refills: 0    Comments: (getting via calcium-vitamin D supplement instead) !! OTHER Discontinue Ensure  Qty: 1 Can, Refills: 0      mineral oil-hydrophil petrolat (AQUAPHOR) ointment Apply  to affected area as needed for Dry Skin. Qty: 452 g, Refills: 1      nitroglycerin (NITROSTAT) 0.4 mg SL tablet by SubLINGual route every five (5) minutes as needed for Chest Pain. !! - Potential duplicate medications found. Please discuss with provider.       STOP taking these medications       lisinopril (PRINIVIL, ZESTRIL) 10 mg tablet Comments:   Reason for Stopping:         metoprolol succinate (TOPROL-XL) 50 mg XL tablet Comments:   Reason for Stopping:         aspirin delayed-release 81 mg tablet Comments:   Reason for Stopping:               Activity: Activity as tolerated    Diet: Regular Diet         Total time spent including time spent on final examination and discharge discussion, discharge documentation and records reviewed and medication reconciliation: > 30 minutes    Timur Quevedo MD  [facility]

## 2019-07-19 NOTE — ROUTINE PROCESS
Bedside and Verbal shift change report given to Ching Ortega RN (oncoming nurse) by Lisa Dubon RN (offgoing nurse). Report included the following information SBAR, ED Summary, Intake/Output, MAR and Med Rec Status.

## 2019-07-22 NOTE — PROGRESS NOTES
Patient admitted to [facility] 7/16-7/19 for abdominal pain, vomitting. Had hiatal hernia and UTI. Surgery deferred, patient wished to return to [facility] under the care of hospice, she is a DNR. [address] [place] [address] with Samreen Langston requesting a return call.

## 2019-07-23 NOTE — PROGRESS NOTES
Second message left with SNF. NN trying to do medication reconciliation and to get the name of MD that will be seeing patient under hospice care.

## 2019-07-23 NOTE — DISCHARGE SUMMARY
Discharge Summary     Patient: Mary Acevedo               Sex: female          DOA: 7/16/2019       YOB: 1928      Age:  80 y.o.        LOS:  LOS: 2 days                 Admit Date: 7/16/2019     Discharge Date: 7/19/2019     Admission Diagnoses: Hiatal hernia [K44.9]  UTI (urinary tract infection) [N39.0]     Discharge Diagnoses:                Hospital Problems  Never Reviewed           Codes Class Noted POA     * (Principal) Hiatal hernia ICD-10-CM: K44.9  ICD-9-CM: 553. 3   7/17/2019 Yes           UTI (urinary tract infection) ICD-10-CM: N39.0  ICD-9-CM: 599.0   7/17/2019 Yes           Late onset Alzheimer's disease without behavioral disturbance ICD-10-CM: G30.1, F02.80  ICD-9-CM: 331.0, 294.10   11/7/2017 Yes           Gastroesophageal reflux disease without esophagitis ICD-10-CM: K21.9  ICD-9-CM: 530.81   11/7/2017 Yes                   Discharge Disposition: Hospice/Medical Facility      Discharge Condition:  Hospice     Hospital Course:  81 yo F with h/o dementia, CKD, CAD s/p CABG who presented from [facility] for abdominal pain, chest pain and vomiting. ER workup notable for large hiatal hernia and an upside down stomach. GI and General Surgery were consulted. Per GI, there were no clinical signs of obstruction or acute gastric volvulus and it was felt EGD would not be therapeutic and the son was against any surgery. Discussion held with family (son) and he elected for hospice care for his mother as he felt she wants to go home and minimize future ER visits and hospitalizations. Hospice was consulted and pt was enrolled into Hospice and was discharged to [facility] in stable condition.  Of note, she finished 3 days of antibiotics for possible UTI.     Consults:               Gastroenterology and General Surgery     Labs:  Labs: Results:         Chemistry     Recent Labs     07/16/19  2138   *      K 4.2      CO2 32   BUN 25*   CREA 1.22   CA 9.5   AGAP 6   BUCR 20   AP 63   TP 6.6   ALB 3.8   GLOB 2.8   AGRAT 1.4       CBC w/Diff     Recent Labs     07/16/19 2138   WBC 6.7   RBC 3.80*   HGB 11.2*   HCT 34.1*      GRANS 71   LYMPH 19*   EOS 1       Cardiac Enzymes     Recent Labs     07/16/19 2138   CPK 76   CKND1 1.8       Coagulation No results for input(s): PTP, INR, APTT in the last 72 hours.     No lab exists for component: INREXT    Lipid Panel No results found for: CHOL, CHOLPOCT, CHOLX, CHLST, CHOLV, 337241, HDL, LDL, LDLC, DLDLP, 376827, VLDLC, VLDL, TGLX, TRIGL, TRIGP, TGLPOCT, CHHD, CHHDX   BNP No results for input(s): BNPP in the last 72 hours. Liver Enzymes     Recent Labs     07/16/19 2138   TP 6.6   ALB 3.8   AP 63   SGOT 14*       Thyroid Studies       Lab Results   Component Value Date/Time     TSH 1.77 05/27/2019 07:57 AM              Significant Diagnostic Studies:   Xr Chest Pa Lat     Result Date: 7/17/2019  EXAM: CHEST, TWO VIEWS CLINICAL INDICATION/HISTORY: Please evaluate for resolution of air-fluid level   > Additional: None. COMPARISON: Single view chest and CT abdomen and pelvis 7/16/2019   > Reference Exam: None. TECHNIQUE: AP and lateral views of the chest were obtained. _______________ FINDINGS: SUPPORT DEVICES: None. HEART AND MEDIASTINUM: Prior coronary bypass surgery. Heart remains at the upper limits of normal. No central vascular congestion. LUNGS AND PLEURAL SPACES: There continues to be a large retrocardiac air-containing mass consistent with hiatal hernia. No focal consolidation or effusion. No pneumothorax. BONY THORAX AND SOFT TISSUES: No acute osseous abnormality. _______________      IMPRESSION: 1. Large hiatal hernia. No acute pulmonary disease. 2. Borderline heart size with mild central vascular congestion.      Ct Abd Pelv W Cont     Result Date: 7/17/2019  EXAM: CT abdomen/pelvis with contrast INDICATION: Abdominal pain and chest pain beginning yesterday. Single episode of nausea and vomiting.  COMPARISON: None TECHNIQUE: Helical volumetric scanning through the abdomen and pelvis was performed intravenous contrast.  Axial, coronal and sagittal reconstructions were created. One or more dose reduction techniques were used on this CT: automated exposure control, adjustment of the mAs and/or kVp according to patient size, and iterative reconstruction techniques. The specific techniques used on this CT exam have been documented in the patient's electronic medical record. Digital Imaging and Communications in Medicine (DICOM) format image data are available to nonaffiliated external healthcare facilities or entities on a secure, media free, reciprocally searchable basis with patient authorization for at least a 12-month period after this study. _______________ FINDINGS: LOWER CHEST: Bilateral lower lobe compressive atelectasis is present adjacent to a very large hiatal hernia. LIVER, BILIARY: Liver is normal. No biliary dilation. Gallbladder is unremarkable. PANCREAS: Normal. SPLEEN: Normal. ADRENALS: Normal. KIDNEYS: Normal. LYMPH NODES: No enlarged lymph nodes. GASTROINTESTINAL TRACT: A very large hiatal hernia is present containing gastric fundus, proximal gastric body, remainder gastric body is below the diaphragm, gastric antrum extending up above the diaphragm to the right in the pylorus and duodenal bulb are at or just above the orifice of the hernia. The stomach is upside down, with the gastric fundus remaining to the left of midline and remainder of the stomach to the right of midline. Overall, the stomach is moderately distended, mostly with fluid above the diaphragm, air/gas below the diaphragm. No significant gastric wall thickening and no adjacent inflammatory fat stranding. Extensive colonic diverticulosis is present, mostly left-sided, without evidence of diverticulitis. No bowel dilatation or wall thickening. PELVIC ORGANS: Atrophic uterus, versus partial hysterectomy.  Urinary bladder appears normal. VASCULATURE: Moderate calcific atherosclerosis is present. No AAA. BONES: No suspicious osseous lesions. Right hip nail in situ. Moderate compression deformity of L2, mild compression deformity of L4 of unknown age, without adjacent fat stranding, probably chronic. OTHER: None. _______________      IMPRESSION: 1. Large hiatal hernia containing an upside down stomach. While unlikely, the possibility of organoaxial gastric volvulus is not completely excluded. 2. No bowel dilatation or acute inflammatory process. 3. Colonic diverticulosis without signs of diverticulitis. A preliminary report was provided by the radiology resident on call at the time of the study.     Xr Chest Jacquie Labrador     Result Date: 7/17/2019  EXAM: Portable frontal view of the chest. CLINICAL INDICATION/HISTORY: Chest pain COMPARISON: 5/27/2019 _______________ FINDINGS: Sternal wires and mediastinal clips are noted. Several residual transcardial pacer wires are noted on the right side of the heart. There has been interval development of a large intrathoracic herniation of stomach with air-fluid level in the midline at the region of the diaphragmatic hiatus. No new lung consolidation mass pleural effusion or pneumothorax. Mild chronic perihilar interstitial changes. _______________      IMPRESSION: 1. No evidence of new active pulmonary disease, chronic perihilar interstitial changes. 2. Interval development of large hiatal gastric hernia.        Discharge Medications:           Current Discharge Medication List             START taking these medications     Details   acetaminophen (TYLENOL) 650 mg suppository Insert 1 Suppository into rectum every four (4) hours as needed for Fever. Qty: 10 Suppository, Refills: 0       bisacodyl (DULCOLAX, BISACODYL,) 10 mg suppository Insert 10 mg into rectum daily.   Qty: 10 Suppository, Refills: 0       morphine (ROXANOL) 100 mg/5 mL (20 mg/mL) concentrated solution Take 0.25-1 mL by mouth every three (3) hours as needed for Pain (for shortness of breath or pain) for up to 7 days. Max Daily Amount: 160 mg.  Qty: 30 mL, Refills: 0     Associated Diagnoses: Late onset Alzheimer's disease without behavioral disturbance       LORazepam (INTENSOL) 2 mg/mL concentrated solution Take 0.5 mL by mouth every three (3) hours as needed for Agitation or Anxiety. Max Daily Amount: 8 mg. Qty: 30 mL, Refills: 0     Associated Diagnoses: Late onset Alzheimer's disease without behavioral disturbance                 CONTINUE these medications which have NOT CHANGED     Details   sodium chloride (DANK 128) 5 % ophthalmic ointment Administer 1 Drop to both eyes nightly.       digestive enzymes tab Take 1 Tab by mouth Before breakfast, lunch, and dinner.       acetaminophen (MAPAP) 500 mg capsule Take 1 Cap by mouth every eight (8) hours as needed for Pain.       [id number] (REFRESH OPTIVE ADVANCED) 0.5-1-0.5 % drop Administer 1 Drop to right eye two (2) times a day.       carboxymethylcellulose sodium (REFRESH LIQUIGEL) 1 % dlgl 1-2 drops each eye qhs prn  Qty: 15 mL, Refills: 3       ondansetron (ZOFRAN ODT) 4 mg disintegrating tablet Take 1 Tab by mouth every eight (8) hours as needed for Nausea. Qty: 30 Tab, Refills: 0       Omeprazole delayed release (PRILOSEC D/R) 20 mg tablet Take 1 Tab by mouth daily. Indications: Heartburn  Qty: 90 Tab, Refills: 3       polyethylene glycol (MIRALAX) 17 gram/dose powder Take 17 g by mouth daily as needed. Indications: constipation  Qty: 238 g, Refills: 1       donepezil (ARICEPT) 5 mg tablet Take 1 Tab by mouth nightly. For one month. If tolerated, increase to 10 mg daily after one month  Qty: 30 Tab, Refills: 2       cholecalciferol (VITAMIN D3) 1,000 unit tablet Take 1 Tab by mouth daily.       calcium carbonate-vitamin D3 600 mg(1,500mg) -800 unit tab Take 1 Tab by mouth daily.       travoprost (TRAVATAN Z) 0.004 % ophthalmic solution Administer 1 Drop to both eyes every evening.     !! OTHER Discontinue vitamin D3  Qty: 1 Tab, Refills: 0     Comments: (getting via calcium-vitamin D supplement instead)       !! OTHER Discontinue Ensure  Qty: 1 Can, Refills: 0       mineral oil-hydrophil petrolat (AQUAPHOR) ointment Apply  to affected area as needed for Dry Skin. Qty: 452 g, Refills: 1       nitroglycerin (NITROSTAT) 0.4 mg SL tablet by SubLINGual route every five (5) minutes as needed for Chest Pain.        !! - Potential duplicate medications found.  Please discuss with provider.            STOP taking these medications         lisinopril (PRINIVIL, ZESTRIL) 10 mg tablet Comments:   Reason for Stopping:            metoprolol succinate (TOPROL-XL) 50 mg XL tablet Comments:   Reason for Stopping:            aspirin delayed-release 81 mg tablet Comments:   Reason for Stopping:                    Activity: Activity as tolerated     Diet: Regular Diet            Total time spent including time spent on final examination and discharge discussion, discharge documentation and records reviewed and medication reconciliation: > 30 minutes     Silvana Fisher MD  [facility]

## 2019-07-24 NOTE — PROGRESS NOTES
Third attempt to reach Gurjit at [facility]. Inquiring about MD that will be following patient while under the care of hospice. No return calls, will now close episode.

## 2019-08-30 ENCOUNTER — HOME CARE VISIT (OUTPATIENT)
Dept: HOSPICE | Facility: HOSPICE | Age: 84
End: 2019-08-30
Payer: MEDICARE

## 2021-02-08 NOTE — PROGRESS NOTES
-- DO NOT REPLY / DO NOT REPLY ALL --  -- Message is from the [facility]--    COVID-19 Universal Screening: N/A - Not about scheduling    General Patient Message      Reason for Call: patient daughter in law Nisha called in they would like for Dr guerita to give call back regarding home health patient.    Caller Information       Type Contact Phone    02/08/2021 02:47 PM CST Phone (Incoming) Nisha [phone]          Alternative phone number: none    Turnaround time given to caller:   \"This message will be sent to [state Provider's name]. The clinical team will fulfill your request as soon as they review your message.\"     1) Please let pt know that urine looks like still has UTI despite taking the cipro. 2) Urine ctx pending. 3) Is she having any f/c/ns, dysuria, hematuria, increase urgency/frequency, abd pain, back pain? 4) She should be done with the cipro. If not, stop. Will try cefuroxime 500mg one po bid x 7 days. 98

## (undated) DEVICE — DERMABOND SKIN ADH 0.7ML -- DERMABOND ADVANCED 12/BX

## (undated) DEVICE — SOL IRR NACL 0.9% 500ML POUR --

## (undated) DEVICE — REM POLYHESIVE ADULT PATIENT RETURN ELECTRODE: Brand: VALLEYLAB

## (undated) DEVICE — KENDALL SCD EXPRESS SLEEVES, KNEE LENGTH, MEDIUM: Brand: KENDALL SCD

## (undated) DEVICE — INTENDED FOR TISSUE SEPARATION, AND OTHER PROCEDURES THAT REQUIRE A SHARP SURGICAL BLADE TO PUNCTURE OR CUT.: Brand: BARD-PARKER ® CARBON RIB-BACK BLADES

## (undated) DEVICE — BLADELESS OBTURATOR

## (undated) DEVICE — SUTURE VCRL SZ 2-0 L27IN ABSRB UD L26MM SH 1/2 CIR J417H

## (undated) DEVICE — COLUMN DRAPE

## (undated) DEVICE — SEAL UNIV 5-8MM DISP BX/10 -- DA VINCI XI - SNGL USE

## (undated) DEVICE — COVER LT HNDL BLU PLAS

## (undated) DEVICE — ELECTRO LUBE IS A SINGLE PATIENT USE DEVICE THAT IS INTENDED TO BE USED ON ELECTROSURGICAL ELECTRODES TO REDUCE STICKING.: Brand: KEY SURGICAL ELECTRO LUBE

## (undated) DEVICE — (D)PREP SKN CHLRAPRP APPL 26ML -- CONVERT TO ITEM 371833

## (undated) DEVICE — SUTURE MCRYL SZ 4-0 L18IN ABSRB UD L19MM PS-2 3/8 CIR PRIM Y496G

## (undated) DEVICE — Device

## (undated) DEVICE — ARM DRAPE

## (undated) DEVICE — SEAL CANN F/12MM 8.5-13MM DISP -- DA VINCI

## (undated) DEVICE — TIP COVER ACCESSORY

## (undated) DEVICE — LIGHT HANDLE: Brand: DEVON

## (undated) DEVICE — STERILE POLYISOPRENE POWDER-FREE SURGICAL GLOVES: Brand: PROTEXIS

## (undated) DEVICE — ANTI-FOG SOLUTION WITH FOAM PAD: Brand: DEVON

## (undated) DEVICE — SOL ANTI-FOG 6ML MEDC -- MEDICHOICE - CONVERT TO 358427